# Patient Record
Sex: MALE | Race: WHITE | NOT HISPANIC OR LATINO | Employment: OTHER | URBAN - METROPOLITAN AREA
[De-identification: names, ages, dates, MRNs, and addresses within clinical notes are randomized per-mention and may not be internally consistent; named-entity substitution may affect disease eponyms.]

---

## 2019-10-20 ENCOUNTER — HOSPITAL ENCOUNTER (INPATIENT)
Facility: HOSPITAL | Age: 84
LOS: 11 days | Discharge: HOME | End: 2019-10-31
Attending: INTERNAL MEDICINE | Admitting: SURGERY

## 2019-10-20 DIAGNOSIS — K91.30 SMALL BOWEL OBSTRUCTION DUE TO POSTOPERATIVE ADHESIONS: Primary | ICD-10-CM

## 2019-10-20 PROBLEM — K43.2 INCISIONAL HERNIA, WITHOUT OBSTRUCTION OR GANGRENE: Chronic | Status: ACTIVE | Noted: 2019-10-20

## 2019-10-20 PROBLEM — E03.9 ACQUIRED HYPOTHYROIDISM: Chronic | Status: ACTIVE | Noted: 2019-10-20

## 2019-10-20 PROBLEM — K56.609 SMALL BOWEL OBSTRUCTION: Status: ACTIVE | Noted: 2019-10-20

## 2019-10-20 PROBLEM — K42.9 UMBILICAL HERNIA WITHOUT OBSTRUCTION AND WITHOUT GANGRENE: Chronic | Status: ACTIVE | Noted: 2019-10-20

## 2019-10-20 PROBLEM — K56.609 BOWEL OBSTRUCTION (HCC): Status: ACTIVE | Noted: 2019-10-20

## 2019-10-20 LAB
D-LACTATE SERPL-SCNC: 1.5 MMOL/L (ref 0.5–2)
DEPRECATED RDW RBC AUTO: 44.6 FL (ref 37–54)
ERYTHROCYTE [DISTWIDTH] IN BLOOD BY AUTOMATED COUNT: 13.6 % (ref 12.3–15.4)
HCT VFR BLD AUTO: 46.6 % (ref 37.5–51)
HGB BLD-MCNC: 15.8 G/DL (ref 13–17.7)
MCH RBC QN AUTO: 31.5 PG (ref 26.6–33)
MCHC RBC AUTO-ENTMCNC: 33.9 G/DL (ref 31.5–35.7)
MCV RBC AUTO: 92.9 FL (ref 79–97)
PLATELET # BLD AUTO: 249 10*3/MM3 (ref 140–450)
PMV BLD AUTO: 9.7 FL (ref 6–12)
PROCALCITONIN SERPL-MCNC: 0.08 NG/ML (ref 0.1–0.25)
RBC # BLD AUTO: 5.01 10*6/MM3 (ref 4.14–5.8)
WBC NRBC COR # BLD: 17.6 10*3/MM3 (ref 3.4–10.8)

## 2019-10-20 PROCEDURE — 87040 BLOOD CULTURE FOR BACTERIA: CPT | Performed by: INTERNAL MEDICINE

## 2019-10-20 PROCEDURE — 83605 ASSAY OF LACTIC ACID: CPT | Performed by: INTERNAL MEDICINE

## 2019-10-20 PROCEDURE — 25010000002 HEPARIN (PORCINE) PER 1000 UNITS: Performed by: INTERNAL MEDICINE

## 2019-10-20 PROCEDURE — 99223 1ST HOSP IP/OBS HIGH 75: CPT | Performed by: INTERNAL MEDICINE

## 2019-10-20 PROCEDURE — 85027 COMPLETE CBC AUTOMATED: CPT | Performed by: INTERNAL MEDICINE

## 2019-10-20 PROCEDURE — 84145 PROCALCITONIN (PCT): CPT | Performed by: INTERNAL MEDICINE

## 2019-10-20 RX ORDER — LEVOTHYROXINE SODIUM 112 UG/1
112 TABLET ORAL DAILY
COMMUNITY
Start: 2014-02-05 | End: 2020-02-13 | Stop reason: SDUPTHER

## 2019-10-20 RX ORDER — SODIUM CHLORIDE, SODIUM LACTATE, POTASSIUM CHLORIDE, CALCIUM CHLORIDE 600; 310; 30; 20 MG/100ML; MG/100ML; MG/100ML; MG/100ML
75 INJECTION, SOLUTION INTRAVENOUS CONTINUOUS
Status: DISCONTINUED | OUTPATIENT
Start: 2019-10-20 | End: 2019-10-28

## 2019-10-20 RX ORDER — ACETAMINOPHEN 650 MG/1
325 SUPPOSITORY RECTAL EVERY 4 HOURS PRN
Status: DISCONTINUED | OUTPATIENT
Start: 2019-10-20 | End: 2019-10-22

## 2019-10-20 RX ORDER — ACETAMINOPHEN 160 MG/5ML
325 SOLUTION ORAL EVERY 4 HOURS PRN
Status: DISCONTINUED | OUTPATIENT
Start: 2019-10-20 | End: 2019-10-22

## 2019-10-20 RX ORDER — PROCHLORPERAZINE EDISYLATE 5 MG/ML
5 INJECTION INTRAMUSCULAR; INTRAVENOUS EVERY 4 HOURS PRN
Status: DISCONTINUED | OUTPATIENT
Start: 2019-10-20 | End: 2019-10-31 | Stop reason: HOSPADM

## 2019-10-20 RX ORDER — HEPARIN SODIUM 5000 [USP'U]/ML
5000 INJECTION, SOLUTION INTRAVENOUS; SUBCUTANEOUS EVERY 12 HOURS SCHEDULED
Status: COMPLETED | OUTPATIENT
Start: 2019-10-20 | End: 2019-10-21

## 2019-10-20 RX ORDER — LEVOTHYROXINE SODIUM 112 UG/1
112 TABLET ORAL
Status: DISCONTINUED | OUTPATIENT
Start: 2019-10-20 | End: 2019-10-31 | Stop reason: HOSPADM

## 2019-10-20 RX ORDER — FAMOTIDINE 10 MG/ML
20 INJECTION, SOLUTION INTRAVENOUS EVERY 12 HOURS SCHEDULED
Status: DISCONTINUED | OUTPATIENT
Start: 2019-10-20 | End: 2019-10-24 | Stop reason: ALTCHOICE

## 2019-10-20 RX ORDER — SODIUM CHLORIDE 0.9 % (FLUSH) 0.9 %
10 SYRINGE (ML) INJECTION EVERY 12 HOURS SCHEDULED
Status: DISCONTINUED | OUTPATIENT
Start: 2019-10-20 | End: 2019-10-31 | Stop reason: HOSPADM

## 2019-10-20 RX ORDER — ACETAMINOPHEN 325 MG/1
325 TABLET ORAL EVERY 4 HOURS PRN
Status: DISCONTINUED | OUTPATIENT
Start: 2019-10-20 | End: 2019-10-22

## 2019-10-20 RX ORDER — SODIUM CHLORIDE 0.9 % (FLUSH) 0.9 %
10 SYRINGE (ML) INJECTION AS NEEDED
Status: DISCONTINUED | OUTPATIENT
Start: 2019-10-20 | End: 2019-10-31 | Stop reason: HOSPADM

## 2019-10-20 RX ADMIN — FAMOTIDINE 20 MG: 10 INJECTION, SOLUTION INTRAVENOUS at 21:17

## 2019-10-20 RX ADMIN — HEPARIN SODIUM 5000 UNITS: 5000 INJECTION, SOLUTION INTRAVENOUS; SUBCUTANEOUS at 21:16

## 2019-10-20 RX ADMIN — SODIUM CHLORIDE, SODIUM LACTATE, POTASSIUM CHLORIDE, AND CALCIUM CHLORIDE 100 ML/HR: 600; 310; 30; 20 INJECTION, SOLUTION INTRAVENOUS at 19:21

## 2019-10-20 RX ADMIN — Medication 10 ML: at 21:17

## 2019-10-21 ENCOUNTER — APPOINTMENT (OUTPATIENT)
Dept: GENERAL RADIOLOGY | Facility: HOSPITAL | Age: 84
End: 2019-10-21

## 2019-10-21 LAB
ANION GAP SERPL CALCULATED.3IONS-SCNC: 12 MMOL/L (ref 5–15)
BASOPHILS # BLD AUTO: 0 10*3/MM3 (ref 0–0.2)
BASOPHILS NFR BLD AUTO: 0.3 % (ref 0–1.5)
BUN BLD-MCNC: 17 MG/DL (ref 8–23)
BUN/CREAT SERPL: 19.8 (ref 7–25)
CALCIUM SPEC-SCNC: 9.2 MG/DL (ref 8.6–10.5)
CHLORIDE SERPL-SCNC: 97 MMOL/L (ref 98–107)
CO2 SERPL-SCNC: 27 MMOL/L (ref 22–29)
CREAT BLD-MCNC: 0.86 MG/DL (ref 0.76–1.27)
DEPRECATED RDW RBC AUTO: 44.6 FL (ref 37–54)
EOSINOPHIL # BLD AUTO: 0 10*3/MM3 (ref 0–0.4)
EOSINOPHIL NFR BLD AUTO: 0 % (ref 0.3–6.2)
ERYTHROCYTE [DISTWIDTH] IN BLOOD BY AUTOMATED COUNT: 13.7 % (ref 12.3–15.4)
GFR SERPL CREATININE-BSD FRML MDRD: 85 ML/MIN/1.73
GLUCOSE BLD-MCNC: 129 MG/DL (ref 65–99)
HCT VFR BLD AUTO: 43.9 % (ref 37.5–51)
HGB BLD-MCNC: 14.7 G/DL (ref 13–17.7)
LYMPHOCYTES # BLD AUTO: 1.6 10*3/MM3 (ref 0.7–3.1)
LYMPHOCYTES NFR BLD AUTO: 13.2 % (ref 19.6–45.3)
MCH RBC QN AUTO: 31.2 PG (ref 26.6–33)
MCHC RBC AUTO-ENTMCNC: 33.4 G/DL (ref 31.5–35.7)
MCV RBC AUTO: 93.3 FL (ref 79–97)
MONOCYTES # BLD AUTO: 1.3 10*3/MM3 (ref 0.1–0.9)
MONOCYTES NFR BLD AUTO: 10.6 % (ref 5–12)
NEUTROPHILS # BLD AUTO: 9.3 10*3/MM3 (ref 1.7–7)
NEUTROPHILS NFR BLD AUTO: 75.9 % (ref 42.7–76)
NRBC BLD AUTO-RTO: 0.1 /100 WBC (ref 0–0.2)
PLATELET # BLD AUTO: 239 10*3/MM3 (ref 140–450)
PMV BLD AUTO: 10.1 FL (ref 6–12)
POTASSIUM BLD-SCNC: 4 MMOL/L (ref 3.5–5.2)
RBC # BLD AUTO: 4.7 10*6/MM3 (ref 4.14–5.8)
SODIUM BLD-SCNC: 136 MMOL/L (ref 136–145)
WBC NRBC COR # BLD: 12.3 10*3/MM3 (ref 3.4–10.8)

## 2019-10-21 PROCEDURE — 99233 SBSQ HOSP IP/OBS HIGH 50: CPT | Performed by: INTERNAL MEDICINE

## 2019-10-21 PROCEDURE — 0 IOPAMIDOL PER 1 ML: Performed by: INTERNAL MEDICINE

## 2019-10-21 PROCEDURE — 74250 X-RAY XM SM INT 1CNTRST STD: CPT

## 2019-10-21 PROCEDURE — 25010000002 MORPHINE PER 10 MG: Performed by: INTERNAL MEDICINE

## 2019-10-21 PROCEDURE — 25010000002 HEPARIN (PORCINE) PER 1000 UNITS: Performed by: INTERNAL MEDICINE

## 2019-10-21 PROCEDURE — 80048 BASIC METABOLIC PNL TOTAL CA: CPT | Performed by: INTERNAL MEDICINE

## 2019-10-21 PROCEDURE — 99222 1ST HOSP IP/OBS MODERATE 55: CPT | Performed by: SURGERY

## 2019-10-21 PROCEDURE — 25010000002 PROCHLORPERAZINE 10 MG/2ML SOLUTION: Performed by: INTERNAL MEDICINE

## 2019-10-21 PROCEDURE — 85025 COMPLETE CBC W/AUTO DIFF WBC: CPT | Performed by: INTERNAL MEDICINE

## 2019-10-21 PROCEDURE — 25010000002 HEPARIN (PORCINE) PER 1000 UNITS: Performed by: SURGERY

## 2019-10-21 RX ORDER — MORPHINE SULFATE 4 MG/ML
2 INJECTION, SOLUTION INTRAMUSCULAR; INTRAVENOUS
Status: DISCONTINUED | OUTPATIENT
Start: 2019-10-21 | End: 2019-10-22

## 2019-10-21 RX ORDER — ONDANSETRON 2 MG/ML
4 INJECTION INTRAMUSCULAR; INTRAVENOUS EVERY 6 HOURS PRN
Status: DISCONTINUED | OUTPATIENT
Start: 2019-10-21 | End: 2019-10-31 | Stop reason: HOSPADM

## 2019-10-21 RX ORDER — MORPHINE SULFATE 4 MG/ML
4 INJECTION, SOLUTION INTRAMUSCULAR; INTRAVENOUS ONCE
Status: COMPLETED | OUTPATIENT
Start: 2019-10-21 | End: 2019-10-21

## 2019-10-21 RX ADMIN — MORPHINE SULFATE 2 MG: 4 INJECTION INTRAVENOUS at 19:36

## 2019-10-21 RX ADMIN — FAMOTIDINE 20 MG: 10 INJECTION, SOLUTION INTRAVENOUS at 08:51

## 2019-10-21 RX ADMIN — MORPHINE SULFATE 2 MG: 4 INJECTION INTRAVENOUS at 04:21

## 2019-10-21 RX ADMIN — IOPAMIDOL 200 ML: 755 INJECTION, SOLUTION INTRAVENOUS at 13:15

## 2019-10-21 RX ADMIN — PROCHLORPERAZINE EDISYLATE 5 MG: 5 INJECTION INTRAMUSCULAR; INTRAVENOUS at 17:38

## 2019-10-21 RX ADMIN — HEPARIN SODIUM 5000 UNITS: 5000 INJECTION, SOLUTION INTRAVENOUS; SUBCUTANEOUS at 08:51

## 2019-10-21 RX ADMIN — Medication 10 ML: at 21:22

## 2019-10-21 RX ADMIN — HEPARIN SODIUM 5000 UNITS: 5000 INJECTION, SOLUTION INTRAVENOUS; SUBCUTANEOUS at 23:05

## 2019-10-21 RX ADMIN — MORPHINE SULFATE 2 MG: 4 INJECTION INTRAVENOUS at 15:23

## 2019-10-21 RX ADMIN — PROCHLORPERAZINE EDISYLATE 5 MG: 5 INJECTION INTRAMUSCULAR; INTRAVENOUS at 04:19

## 2019-10-21 RX ADMIN — FAMOTIDINE 20 MG: 10 INJECTION, SOLUTION INTRAVENOUS at 21:21

## 2019-10-21 RX ADMIN — Medication 10 ML: at 08:52

## 2019-10-21 RX ADMIN — SODIUM CHLORIDE, SODIUM LACTATE, POTASSIUM CHLORIDE, AND CALCIUM CHLORIDE 100 ML/HR: 600; 310; 30; 20 INJECTION, SOLUTION INTRAVENOUS at 04:32

## 2019-10-22 ENCOUNTER — ANESTHESIA EVENT (OUTPATIENT)
Dept: PERIOP | Facility: HOSPITAL | Age: 84
End: 2019-10-22

## 2019-10-22 ENCOUNTER — ANESTHESIA (OUTPATIENT)
Dept: PERIOP | Facility: HOSPITAL | Age: 84
End: 2019-10-22

## 2019-10-22 LAB
ABO GROUP BLD: NORMAL
ALBUMIN SERPL-MCNC: 4 G/DL (ref 3.5–5.2)
ALBUMIN/GLOB SERPL: 1.3 G/DL
ALP SERPL-CCNC: 60 U/L (ref 39–117)
ALT SERPL W P-5'-P-CCNC: 13 U/L (ref 1–41)
ANION GAP SERPL CALCULATED.3IONS-SCNC: 14 MMOL/L (ref 5–15)
APTT PPP: 25.9 SECONDS (ref 24–31)
AST SERPL-CCNC: 20 U/L (ref 1–40)
BASOPHILS # BLD AUTO: 0.1 10*3/MM3 (ref 0–0.2)
BASOPHILS NFR BLD AUTO: 0.5 % (ref 0–1.5)
BILIRUB SERPL-MCNC: 0.5 MG/DL (ref 0.2–1.2)
BLD GP AB SCN SERPL QL: NEGATIVE
BUN BLD-MCNC: 23 MG/DL (ref 8–23)
BUN/CREAT SERPL: 27.7 (ref 7–25)
CALCIUM SPEC-SCNC: 9.7 MG/DL (ref 8.6–10.5)
CHLORIDE SERPL-SCNC: 98 MMOL/L (ref 98–107)
CO2 SERPL-SCNC: 28 MMOL/L (ref 22–29)
CREAT BLD-MCNC: 0.83 MG/DL (ref 0.76–1.27)
DEPRECATED RDW RBC AUTO: 45.1 FL (ref 37–54)
EOSINOPHIL # BLD AUTO: 0 10*3/MM3 (ref 0–0.4)
EOSINOPHIL NFR BLD AUTO: 0.1 % (ref 0.3–6.2)
ERYTHROCYTE [DISTWIDTH] IN BLOOD BY AUTOMATED COUNT: 13.7 % (ref 12.3–15.4)
GFR SERPL CREATININE-BSD FRML MDRD: 88 ML/MIN/1.73
GLOBULIN UR ELPH-MCNC: 3.2 GM/DL
GLUCOSE BLD-MCNC: 135 MG/DL (ref 65–99)
HCT VFR BLD AUTO: 44.5 % (ref 37.5–51)
HGB BLD-MCNC: 15 G/DL (ref 13–17.7)
INR PPP: 1.03 (ref 0.9–1.1)
LYMPHOCYTES # BLD AUTO: 1.5 10*3/MM3 (ref 0.7–3.1)
LYMPHOCYTES NFR BLD AUTO: 12.4 % (ref 19.6–45.3)
MCH RBC QN AUTO: 31.3 PG (ref 26.6–33)
MCHC RBC AUTO-ENTMCNC: 33.7 G/DL (ref 31.5–35.7)
MCV RBC AUTO: 92.8 FL (ref 79–97)
MONOCYTES # BLD AUTO: 1.5 10*3/MM3 (ref 0.1–0.9)
MONOCYTES NFR BLD AUTO: 11.8 % (ref 5–12)
NEUTROPHILS # BLD AUTO: 9.3 10*3/MM3 (ref 1.7–7)
NEUTROPHILS NFR BLD AUTO: 75.2 % (ref 42.7–76)
NRBC BLD AUTO-RTO: 0 /100 WBC (ref 0–0.2)
PLATELET # BLD AUTO: 238 10*3/MM3 (ref 140–450)
PMV BLD AUTO: 10.2 FL (ref 6–12)
POTASSIUM BLD-SCNC: 3.9 MMOL/L (ref 3.5–5.2)
PROT SERPL-MCNC: 7.2 G/DL (ref 6–8.5)
PROTHROMBIN TIME: 10.7 SECONDS (ref 9.6–11.7)
RBC # BLD AUTO: 4.79 10*6/MM3 (ref 4.14–5.8)
RH BLD: POSITIVE
SODIUM BLD-SCNC: 140 MMOL/L (ref 136–145)
T&S EXPIRATION DATE: NORMAL
TSH SERPL DL<=0.05 MIU/L-ACNC: 1.19 UIU/ML (ref 0.27–4.2)
WBC NRBC COR # BLD: 12.4 10*3/MM3 (ref 3.4–10.8)

## 2019-10-22 PROCEDURE — 88302 TISSUE EXAM BY PATHOLOGIST: CPT | Performed by: SURGERY

## 2019-10-22 PROCEDURE — 86900 BLOOD TYPING SEROLOGIC ABO: CPT

## 2019-10-22 PROCEDURE — 86850 RBC ANTIBODY SCREEN: CPT | Performed by: SURGERY

## 2019-10-22 PROCEDURE — 0KNK0ZZ RELEASE RIGHT ABDOMEN MUSCLE, OPEN APPROACH: ICD-10-PCS | Performed by: SURGERY

## 2019-10-22 PROCEDURE — 15734 MUSCLE-SKIN GRAFT TRUNK: CPT | Performed by: SURGERY

## 2019-10-22 PROCEDURE — 25010000002 HYDROMORPHONE PER 4 MG: Performed by: ANESTHESIOLOGY

## 2019-10-22 PROCEDURE — 25010000002 HYDRALAZINE PER 20 MG: Performed by: INTERNAL MEDICINE

## 2019-10-22 PROCEDURE — 25010000002 CEFOXITIN PER 1 G: Performed by: SURGERY

## 2019-10-22 PROCEDURE — 44120 REMOVAL OF SMALL INTESTINE: CPT | Performed by: SURGERY

## 2019-10-22 PROCEDURE — 25010000002 MORPHINE PER 10 MG: Performed by: INTERNAL MEDICINE

## 2019-10-22 PROCEDURE — 0T9B80Z DRAINAGE OF BLADDER WITH DRAINAGE DEVICE, VIA NATURAL OR ARTIFICIAL OPENING ENDOSCOPIC: ICD-10-PCS | Performed by: UROLOGY

## 2019-10-22 PROCEDURE — 0WPF0JZ REMOVAL OF SYNTHETIC SUBSTITUTE FROM ABDOMINAL WALL, OPEN APPROACH: ICD-10-PCS | Performed by: SURGERY

## 2019-10-22 PROCEDURE — C1781 MESH (IMPLANTABLE): HCPCS | Performed by: SURGERY

## 2019-10-22 PROCEDURE — 84443 ASSAY THYROID STIM HORMONE: CPT | Performed by: INTERNAL MEDICINE

## 2019-10-22 PROCEDURE — 25010000002 DEXAMETHASONE PER 1 MG: Performed by: ANESTHESIOLOGY

## 2019-10-22 PROCEDURE — 25010000002 FENTANYL CITRATE (PF) 100 MCG/2ML SOLUTION: Performed by: ANESTHESIOLOGY

## 2019-10-22 PROCEDURE — 0DN80ZZ RELEASE SMALL INTESTINE, OPEN APPROACH: ICD-10-PCS | Performed by: SURGERY

## 2019-10-22 PROCEDURE — 80053 COMPREHEN METABOLIC PANEL: CPT | Performed by: INTERNAL MEDICINE

## 2019-10-22 PROCEDURE — 85025 COMPLETE CBC W/AUTO DIFF WBC: CPT | Performed by: INTERNAL MEDICINE

## 2019-10-22 PROCEDURE — 49561 PR REPAIR INCISIONAL HERNIA,STRANG: CPT | Performed by: SURGERY

## 2019-10-22 PROCEDURE — 86901 BLOOD TYPING SEROLOGIC RH(D): CPT

## 2019-10-22 PROCEDURE — 88307 TISSUE EXAM BY PATHOLOGIST: CPT | Performed by: SURGERY

## 2019-10-22 PROCEDURE — 0KNL0ZZ RELEASE LEFT ABDOMEN MUSCLE, OPEN APPROACH: ICD-10-PCS | Performed by: SURGERY

## 2019-10-22 PROCEDURE — 99233 SBSQ HOSP IP/OBS HIGH 50: CPT | Performed by: INTERNAL MEDICINE

## 2019-10-22 PROCEDURE — 0WUF0JZ SUPPLEMENT ABDOMINAL WALL WITH SYNTHETIC SUBSTITUTE, OPEN APPROACH: ICD-10-PCS | Performed by: SURGERY

## 2019-10-22 PROCEDURE — 85730 THROMBOPLASTIN TIME PARTIAL: CPT | Performed by: SURGERY

## 2019-10-22 PROCEDURE — 25010000002 ONDANSETRON PER 1 MG: Performed by: INTERNAL MEDICINE

## 2019-10-22 PROCEDURE — 86900 BLOOD TYPING SEROLOGIC ABO: CPT | Performed by: SURGERY

## 2019-10-22 PROCEDURE — 85610 PROTHROMBIN TIME: CPT | Performed by: SURGERY

## 2019-10-22 PROCEDURE — 0DB80ZZ EXCISION OF SMALL INTESTINE, OPEN APPROACH: ICD-10-PCS | Performed by: SURGERY

## 2019-10-22 PROCEDURE — 25010000002 PROPOFOL 10 MG/ML EMULSION: Performed by: ANESTHESIOLOGY

## 2019-10-22 PROCEDURE — 86901 BLOOD TYPING SEROLOGIC RH(D): CPT | Performed by: SURGERY

## 2019-10-22 PROCEDURE — C1769 GUIDE WIRE: HCPCS | Performed by: SURGERY

## 2019-10-22 PROCEDURE — 99024 POSTOP FOLLOW-UP VISIT: CPT | Performed by: SURGERY

## 2019-10-22 DEVICE — PROXIMATE RELOADABLE LINEAR CUTTER WITH SAFETY LOCK-OUT, 75MM
Type: IMPLANTABLE DEVICE | Site: ABDOMEN | Status: FUNCTIONAL
Brand: PROXIMATE

## 2019-10-22 DEVICE — UNDYED KNITTED (POLYGLACTIN 910), SYNTHETIC ABSORBABLE MESH
Type: IMPLANTABLE DEVICE | Site: ABDOMEN | Status: FUNCTIONAL
Brand: VICRYL

## 2019-10-22 DEVICE — PROXIMATE LINEAR CUTTER RELOAD, BLUE, 75MM
Type: IMPLANTABLE DEVICE | Site: ABDOMEN | Status: FUNCTIONAL
Brand: PROXIMATE

## 2019-10-22 RX ORDER — OXYCODONE HYDROCHLORIDE 5 MG/1
5 TABLET ORAL EVERY 4 HOURS PRN
Status: DISCONTINUED | OUTPATIENT
Start: 2019-10-22 | End: 2019-10-31 | Stop reason: HOSPADM

## 2019-10-22 RX ORDER — FENTANYL CITRATE 50 UG/ML
INJECTION, SOLUTION INTRAMUSCULAR; INTRAVENOUS AS NEEDED
Status: DISCONTINUED | OUTPATIENT
Start: 2019-10-22 | End: 2019-10-22 | Stop reason: SURG

## 2019-10-22 RX ORDER — MORPHINE SULFATE 4 MG/ML
5 INJECTION, SOLUTION INTRAMUSCULAR; INTRAVENOUS
Status: DISCONTINUED | OUTPATIENT
Start: 2019-10-22 | End: 2019-10-22 | Stop reason: HOSPADM

## 2019-10-22 RX ORDER — PROPOFOL 10 MG/ML
VIAL (ML) INTRAVENOUS AS NEEDED
Status: DISCONTINUED | OUTPATIENT
Start: 2019-10-22 | End: 2019-10-22 | Stop reason: SURG

## 2019-10-22 RX ORDER — ONDANSETRON 2 MG/ML
4 INJECTION INTRAMUSCULAR; INTRAVENOUS ONCE
Status: DISCONTINUED | OUTPATIENT
Start: 2019-10-22 | End: 2019-10-22 | Stop reason: HOSPADM

## 2019-10-22 RX ORDER — NEOSTIGMINE METHYLSULFATE 5 MG/5 ML
SYRINGE (ML) INTRAVENOUS AS NEEDED
Status: DISCONTINUED | OUTPATIENT
Start: 2019-10-22 | End: 2019-10-22 | Stop reason: SURG

## 2019-10-22 RX ORDER — ROCURONIUM BROMIDE 10 MG/ML
INJECTION, SOLUTION INTRAVENOUS AS NEEDED
Status: DISCONTINUED | OUTPATIENT
Start: 2019-10-22 | End: 2019-10-22 | Stop reason: SURG

## 2019-10-22 RX ORDER — OXYCODONE HYDROCHLORIDE 5 MG/1
5 TABLET ORAL ONCE AS NEEDED
Status: DISCONTINUED | OUTPATIENT
Start: 2019-10-22 | End: 2019-10-22 | Stop reason: HOSPADM

## 2019-10-22 RX ORDER — METOPROLOL TARTRATE 5 MG/5ML
2 INJECTION INTRAVENOUS ONCE
Status: DISCONTINUED | OUTPATIENT
Start: 2019-10-22 | End: 2019-10-22 | Stop reason: HOSPADM

## 2019-10-22 RX ORDER — PHENYLEPHRINE HCL IN 0.9% NACL 0.5 MG/5ML
SYRINGE (ML) INTRAVENOUS AS NEEDED
Status: DISCONTINUED | OUTPATIENT
Start: 2019-10-22 | End: 2019-10-22 | Stop reason: SURG

## 2019-10-22 RX ORDER — MEPERIDINE HYDROCHLORIDE 25 MG/ML
12.5 INJECTION INTRAMUSCULAR; INTRAVENOUS; SUBCUTANEOUS
Status: DISCONTINUED | OUTPATIENT
Start: 2019-10-22 | End: 2019-10-22 | Stop reason: HOSPADM

## 2019-10-22 RX ORDER — DEXAMETHASONE SODIUM PHOSPHATE 4 MG/ML
INJECTION, SOLUTION INTRA-ARTICULAR; INTRALESIONAL; INTRAMUSCULAR; INTRAVENOUS; SOFT TISSUE AS NEEDED
Status: DISCONTINUED | OUTPATIENT
Start: 2019-10-22 | End: 2019-10-22 | Stop reason: SURG

## 2019-10-22 RX ORDER — HYDROMORPHONE HCL 110MG/55ML
1 PATIENT CONTROLLED ANALGESIA SYRINGE INTRAVENOUS
Status: DISCONTINUED | OUTPATIENT
Start: 2019-10-22 | End: 2019-10-22 | Stop reason: HOSPADM

## 2019-10-22 RX ORDER — HYDRALAZINE HYDROCHLORIDE 20 MG/ML
10 INJECTION INTRAMUSCULAR; INTRAVENOUS EVERY 6 HOURS PRN
Status: DISCONTINUED | OUTPATIENT
Start: 2019-10-22 | End: 2019-10-31 | Stop reason: HOSPADM

## 2019-10-22 RX ORDER — MAGNESIUM HYDROXIDE 1200 MG/15ML
LIQUID ORAL AS NEEDED
Status: DISCONTINUED | OUTPATIENT
Start: 2019-10-22 | End: 2019-10-22 | Stop reason: HOSPADM

## 2019-10-22 RX ORDER — ACETAMINOPHEN 500 MG
1000 TABLET ORAL EVERY 8 HOURS
Status: DISPENSED | OUTPATIENT
Start: 2019-10-22 | End: 2019-10-24

## 2019-10-22 RX ORDER — MORPHINE SULFATE 4 MG/ML
4 INJECTION, SOLUTION INTRAMUSCULAR; INTRAVENOUS
Status: DISCONTINUED | OUTPATIENT
Start: 2019-10-22 | End: 2019-10-30

## 2019-10-22 RX ORDER — LIDOCAINE HYDROCHLORIDE 20 MG/ML
INJECTION, SOLUTION EPIDURAL; INFILTRATION; INTRACAUDAL; PERINEURAL AS NEEDED
Status: DISCONTINUED | OUTPATIENT
Start: 2019-10-22 | End: 2019-10-22 | Stop reason: SURG

## 2019-10-22 RX ORDER — GLYCOPYRROLATE 1 MG/5 ML
SYRINGE (ML) INTRAVENOUS AS NEEDED
Status: DISCONTINUED | OUTPATIENT
Start: 2019-10-22 | End: 2019-10-22 | Stop reason: SURG

## 2019-10-22 RX ORDER — SODIUM CHLORIDE 9 MG/ML
INJECTION, SOLUTION INTRAVENOUS CONTINUOUS PRN
Status: DISCONTINUED | OUTPATIENT
Start: 2019-10-22 | End: 2019-10-22 | Stop reason: SURG

## 2019-10-22 RX ADMIN — FENTANYL CITRATE 25 MCG: 50 INJECTION, SOLUTION INTRAMUSCULAR; INTRAVENOUS at 14:58

## 2019-10-22 RX ADMIN — FENTANYL CITRATE 25 MCG: 50 INJECTION, SOLUTION INTRAMUSCULAR; INTRAVENOUS at 15:00

## 2019-10-22 RX ADMIN — FENTANYL CITRATE 25 MCG: 50 INJECTION, SOLUTION INTRAMUSCULAR; INTRAVENOUS at 14:01

## 2019-10-22 RX ADMIN — PHENYLEPHRINE HYDROCHLORIDE 100 MCG: 10 INJECTION INTRAVENOUS at 15:28

## 2019-10-22 RX ADMIN — HYDROMORPHONE HYDROCHLORIDE 1 MG: 2 INJECTION INTRAMUSCULAR; INTRAVENOUS; SUBCUTANEOUS at 18:44

## 2019-10-22 RX ADMIN — PROPOFOL 120 MG: 10 INJECTION, EMULSION INTRAVENOUS at 14:03

## 2019-10-22 RX ADMIN — PHENYLEPHRINE HYDROCHLORIDE 100 MCG: 10 INJECTION INTRAVENOUS at 15:26

## 2019-10-22 RX ADMIN — ROCURONIUM BROMIDE 20 MG: 10 INJECTION, SOLUTION INTRAVENOUS at 15:16

## 2019-10-22 RX ADMIN — SODIUM CHLORIDE, SODIUM LACTATE, POTASSIUM CHLORIDE, AND CALCIUM CHLORIDE 100 ML/HR: 600; 310; 30; 20 INJECTION, SOLUTION INTRAVENOUS at 06:50

## 2019-10-22 RX ADMIN — FENTANYL CITRATE 50 MCG: 50 INJECTION, SOLUTION INTRAMUSCULAR; INTRAVENOUS at 18:00

## 2019-10-22 RX ADMIN — PHENYLEPHRINE HYDROCHLORIDE 100 MCG: 10 INJECTION INTRAVENOUS at 16:35

## 2019-10-22 RX ADMIN — LIDOCAINE HYDROCHLORIDE 50 MG: 20 INJECTION, SOLUTION EPIDURAL; INFILTRATION; INTRACAUDAL; PERINEURAL at 14:03

## 2019-10-22 RX ADMIN — SODIUM CHLORIDE: 0.9 INJECTION, SOLUTION INTRAVENOUS at 14:23

## 2019-10-22 RX ADMIN — FENTANYL CITRATE 25 MCG: 50 INJECTION, SOLUTION INTRAMUSCULAR; INTRAVENOUS at 14:03

## 2019-10-22 RX ADMIN — SODIUM CHLORIDE: 0.9 INJECTION, SOLUTION INTRAVENOUS at 16:50

## 2019-10-22 RX ADMIN — MORPHINE SULFATE 2 MG: 4 INJECTION INTRAVENOUS at 08:25

## 2019-10-22 RX ADMIN — FENTANYL CITRATE 25 MCG: 50 INJECTION, SOLUTION INTRAMUSCULAR; INTRAVENOUS at 17:20

## 2019-10-22 RX ADMIN — ROCURONIUM BROMIDE 20 MG: 10 INJECTION, SOLUTION INTRAVENOUS at 14:37

## 2019-10-22 RX ADMIN — Medication 10 ML: at 22:36

## 2019-10-22 RX ADMIN — Medication 10 ML: at 08:25

## 2019-10-22 RX ADMIN — HYDROMORPHONE HYDROCHLORIDE 1 MG: 2 INJECTION INTRAMUSCULAR; INTRAVENOUS; SUBCUTANEOUS at 19:03

## 2019-10-22 RX ADMIN — HYDRALAZINE HYDROCHLORIDE 10 MG: 20 INJECTION INTRAMUSCULAR; INTRAVENOUS at 09:17

## 2019-10-22 RX ADMIN — ROCURONIUM BROMIDE 10 MG: 10 INJECTION, SOLUTION INTRAVENOUS at 16:05

## 2019-10-22 RX ADMIN — ROCURONIUM BROMIDE 10 MG: 10 INJECTION, SOLUTION INTRAVENOUS at 14:19

## 2019-10-22 RX ADMIN — FENTANYL CITRATE 25 MCG: 50 INJECTION, SOLUTION INTRAMUSCULAR; INTRAVENOUS at 15:34

## 2019-10-22 RX ADMIN — FAMOTIDINE 20 MG: 10 INJECTION, SOLUTION INTRAVENOUS at 22:35

## 2019-10-22 RX ADMIN — ONDANSETRON 4 MG: 2 INJECTION INTRAMUSCULAR; INTRAVENOUS at 17:30

## 2019-10-22 RX ADMIN — CEFOXITIN SODIUM 2 G: 2 POWDER, FOR SOLUTION INTRAVENOUS at 23:08

## 2019-10-22 RX ADMIN — ROCURONIUM BROMIDE 40 MG: 10 INJECTION, SOLUTION INTRAVENOUS at 14:03

## 2019-10-22 RX ADMIN — MORPHINE SULFATE 2 MG: 4 INJECTION INTRAVENOUS at 11:40

## 2019-10-22 RX ADMIN — CEFAZOLIN SODIUM 2 G: 1 INJECTION, POWDER, FOR SOLUTION INTRAMUSCULAR; INTRAVENOUS at 14:11

## 2019-10-22 RX ADMIN — ROCURONIUM BROMIDE 20 MG: 10 INJECTION, SOLUTION INTRAVENOUS at 16:50

## 2019-10-22 RX ADMIN — FENTANYL CITRATE 25 MCG: 50 INJECTION, SOLUTION INTRAMUSCULAR; INTRAVENOUS at 15:06

## 2019-10-22 RX ADMIN — FENTANYL CITRATE 50 MCG: 50 INJECTION, SOLUTION INTRAMUSCULAR; INTRAVENOUS at 17:50

## 2019-10-22 RX ADMIN — Medication 3 MG: at 17:55

## 2019-10-22 RX ADMIN — FENTANYL CITRATE 25 MCG: 50 INJECTION, SOLUTION INTRAMUSCULAR; INTRAVENOUS at 14:37

## 2019-10-22 RX ADMIN — FAMOTIDINE 20 MG: 10 INJECTION, SOLUTION INTRAVENOUS at 08:25

## 2019-10-22 RX ADMIN — Medication 0.4 MG: at 17:55

## 2019-10-22 RX ADMIN — DEXAMETHASONE SODIUM PHOSPHATE 4 MG: 4 INJECTION, SOLUTION INTRAMUSCULAR; INTRAVENOUS at 14:12

## 2019-10-22 NOTE — ANESTHESIA PREPROCEDURE EVALUATION
Anesthesia Evaluation     Patient summary reviewed and Nursing notes reviewed   NPO Solid Status: > 8 hours  NPO Liquid Status: > 8 hours           Airway   Mallampati: II  TM distance: >3 FB  Neck ROM: full  No difficulty expected  Dental    (+) edentulous    Pulmonary - negative pulmonary ROS and normal exam   Cardiovascular - negative cardio ROS and normal exam        Neuro/Psych- negative ROS  GI/Hepatic/Renal/Endo    (+)   hypothyroidism,     Musculoskeletal (-) negative ROS    Abdominal    Substance History      OB/GYN          Other - negative ROS                       Anesthesia Plan    ASA 3     general     intravenous induction   Anesthetic plan, all risks, benefits, and alternatives have been provided, discussed and informed consent has been obtained with: patient and child.

## 2019-10-22 NOTE — ANESTHESIA PROCEDURE NOTES
Airway  Urgency: elective    Date/Time: 10/22/2019 2:04 PM  Airway not difficult    General Information and Staff    Patient location during procedure: OR    Indications and Patient Condition    Preoxygenated: yes  Mask difficulty assessment: 0 - not attempted    Final Airway Details  Final airway type: endotracheal airway      Successful airway: ETT    Successful intubation technique: direct laryngoscopy  Endotracheal tube insertion site: oral  Blade: Prashanth  Blade size: 3  ETT size (mm): 8.0  Cormack-Lehane Classification: grade I - full view of glottis  Placement verified by: chest auscultation and capnometry   Measured from: gums  ETT/EBT to gums (cm): 21  Number of attempts at approach: 1  Assessment: atraumatic intubation

## 2019-10-23 LAB
ANION GAP SERPL CALCULATED.3IONS-SCNC: 11 MMOL/L (ref 5–15)
BUN BLD-MCNC: 36 MG/DL (ref 8–23)
BUN/CREAT SERPL: 22.9 (ref 7–25)
CALCIUM SPEC-SCNC: 8.2 MG/DL (ref 8.6–10.5)
CHLORIDE SERPL-SCNC: 107 MMOL/L (ref 98–107)
CO2 SERPL-SCNC: 23 MMOL/L (ref 22–29)
CREAT BLD-MCNC: 1.57 MG/DL (ref 0.76–1.27)
DEPRECATED RDW RBC AUTO: 47.7 FL (ref 37–54)
ERYTHROCYTE [DISTWIDTH] IN BLOOD BY AUTOMATED COUNT: 14.3 % (ref 12.3–15.4)
GFR SERPL CREATININE-BSD FRML MDRD: 42 ML/MIN/1.73
GIANT PLATELETS: ABNORMAL
GLUCOSE BLD-MCNC: 119 MG/DL (ref 65–99)
HCT VFR BLD AUTO: 43.3 % (ref 37.5–51)
HGB BLD-MCNC: 14.3 G/DL (ref 13–17.7)
LYMPHOCYTES # BLD MANUAL: 1.39 10*3/MM3 (ref 0.7–3.1)
LYMPHOCYTES NFR BLD MANUAL: 8 % (ref 19.6–45.3)
LYMPHOCYTES NFR BLD MANUAL: 8 % (ref 5–12)
MAGNESIUM SERPL-MCNC: 1.6 MG/DL (ref 1.6–2.4)
MCH RBC QN AUTO: 31.2 PG (ref 26.6–33)
MCHC RBC AUTO-ENTMCNC: 33.1 G/DL (ref 31.5–35.7)
MCV RBC AUTO: 94.4 FL (ref 79–97)
METAMYELOCYTES NFR BLD MANUAL: 3 % (ref 0–0)
MONOCYTES # BLD AUTO: 1.39 10*3/MM3 (ref 0.1–0.9)
NEUTROPHILS # BLD AUTO: 14.09 10*3/MM3 (ref 1.7–7)
NEUTROPHILS NFR BLD MANUAL: 33 % (ref 42.7–76)
NEUTS BAND NFR BLD MANUAL: 48 % (ref 0–5)
PLATELET # BLD AUTO: 239 10*3/MM3 (ref 140–450)
PMV BLD AUTO: 10.3 FL (ref 6–12)
POTASSIUM BLD-SCNC: 4.4 MMOL/L (ref 3.5–5.2)
RBC # BLD AUTO: 4.58 10*6/MM3 (ref 4.14–5.8)
RBC MORPH BLD: NORMAL
SCAN SLIDE: NORMAL
SODIUM BLD-SCNC: 141 MMOL/L (ref 136–145)
TOXIC GRANULATION: ABNORMAL
WBC NRBC COR # BLD: 17.4 10*3/MM3 (ref 3.4–10.8)

## 2019-10-23 PROCEDURE — 83735 ASSAY OF MAGNESIUM: CPT | Performed by: INTERNAL MEDICINE

## 2019-10-23 PROCEDURE — 85025 COMPLETE CBC W/AUTO DIFF WBC: CPT | Performed by: INTERNAL MEDICINE

## 2019-10-23 PROCEDURE — 99024 POSTOP FOLLOW-UP VISIT: CPT | Performed by: SURGERY

## 2019-10-23 PROCEDURE — 80048 BASIC METABOLIC PNL TOTAL CA: CPT | Performed by: INTERNAL MEDICINE

## 2019-10-23 PROCEDURE — 25010000002 CEFOXITIN PER 1 G: Performed by: SURGERY

## 2019-10-23 PROCEDURE — 25010000002 MORPHINE PER 10 MG: Performed by: SURGERY

## 2019-10-23 PROCEDURE — 99233 SBSQ HOSP IP/OBS HIGH 50: CPT | Performed by: INTERNAL MEDICINE

## 2019-10-23 PROCEDURE — 85007 BL SMEAR W/DIFF WBC COUNT: CPT | Performed by: INTERNAL MEDICINE

## 2019-10-23 RX ADMIN — SODIUM CHLORIDE, SODIUM LACTATE, POTASSIUM CHLORIDE, AND CALCIUM CHLORIDE 100 ML/HR: 600; 310; 30; 20 INJECTION, SOLUTION INTRAVENOUS at 21:51

## 2019-10-23 RX ADMIN — ACETAMINOPHEN 1000 MG: 500 TABLET, FILM COATED ORAL at 16:27

## 2019-10-23 RX ADMIN — CEFOXITIN SODIUM 2 G: 2 POWDER, FOR SOLUTION INTRAVENOUS at 09:50

## 2019-10-23 RX ADMIN — ACETAMINOPHEN 1000 MG: 500 TABLET, FILM COATED ORAL at 21:50

## 2019-10-23 RX ADMIN — SODIUM CHLORIDE, SODIUM LACTATE, POTASSIUM CHLORIDE, AND CALCIUM CHLORIDE 100 ML/HR: 600; 310; 30; 20 INJECTION, SOLUTION INTRAVENOUS at 11:58

## 2019-10-23 RX ADMIN — FAMOTIDINE 20 MG: 10 INJECTION, SOLUTION INTRAVENOUS at 21:50

## 2019-10-23 RX ADMIN — CEFOXITIN SODIUM 2 G: 2 POWDER, FOR SOLUTION INTRAVENOUS at 02:52

## 2019-10-23 RX ADMIN — MORPHINE SULFATE 4 MG: 4 INJECTION INTRAVENOUS at 11:59

## 2019-10-23 RX ADMIN — FAMOTIDINE 20 MG: 10 INJECTION, SOLUTION INTRAVENOUS at 09:12

## 2019-10-23 RX ADMIN — Medication 10 ML: at 21:50

## 2019-10-23 RX ADMIN — Medication 10 ML: at 09:12

## 2019-10-24 LAB
ANION GAP SERPL CALCULATED.3IONS-SCNC: 10 MMOL/L (ref 5–15)
BASOPHILS # BLD AUTO: 0 10*3/MM3 (ref 0–0.2)
BASOPHILS NFR BLD AUTO: 0.1 % (ref 0–1.5)
BUN BLD-MCNC: 34 MG/DL (ref 8–23)
BUN/CREAT SERPL: 28.6 (ref 7–25)
CALCIUM SPEC-SCNC: 8.1 MG/DL (ref 8.6–10.5)
CHLORIDE SERPL-SCNC: 108 MMOL/L (ref 98–107)
CO2 SERPL-SCNC: 24 MMOL/L (ref 22–29)
CREAT BLD-MCNC: 1.19 MG/DL (ref 0.76–1.27)
DEPRECATED RDW RBC AUTO: 46.4 FL (ref 37–54)
EOSINOPHIL # BLD AUTO: 0 10*3/MM3 (ref 0–0.4)
EOSINOPHIL NFR BLD AUTO: 0.1 % (ref 0.3–6.2)
ERYTHROCYTE [DISTWIDTH] IN BLOOD BY AUTOMATED COUNT: 14 % (ref 12.3–15.4)
GFR SERPL CREATININE-BSD FRML MDRD: 58 ML/MIN/1.73
GLUCOSE BLD-MCNC: 124 MG/DL (ref 65–99)
HCT VFR BLD AUTO: 35.5 % (ref 37.5–51)
HGB BLD-MCNC: 12.1 G/DL (ref 13–17.7)
LAB AP CASE REPORT: NORMAL
LYMPHOCYTES # BLD AUTO: 1.6 10*3/MM3 (ref 0.7–3.1)
LYMPHOCYTES NFR BLD AUTO: 11.5 % (ref 19.6–45.3)
MCH RBC QN AUTO: 31.9 PG (ref 26.6–33)
MCHC RBC AUTO-ENTMCNC: 33.9 G/DL (ref 31.5–35.7)
MCV RBC AUTO: 94.1 FL (ref 79–97)
MONOCYTES # BLD AUTO: 1.1 10*3/MM3 (ref 0.1–0.9)
MONOCYTES NFR BLD AUTO: 7.6 % (ref 5–12)
NEUTROPHILS # BLD AUTO: 11.6 10*3/MM3 (ref 1.7–7)
NEUTROPHILS NFR BLD AUTO: 80.7 % (ref 42.7–76)
NRBC BLD AUTO-RTO: 0 /100 WBC (ref 0–0.2)
PATH REPORT.FINAL DX SPEC: NORMAL
PATH REPORT.GROSS SPEC: NORMAL
PLATELET # BLD AUTO: 186 10*3/MM3 (ref 140–450)
PMV BLD AUTO: 10.2 FL (ref 6–12)
POTASSIUM BLD-SCNC: 3.7 MMOL/L (ref 3.5–5.2)
RBC # BLD AUTO: 3.78 10*6/MM3 (ref 4.14–5.8)
SODIUM BLD-SCNC: 142 MMOL/L (ref 136–145)
WBC NRBC COR # BLD: 14.3 10*3/MM3 (ref 3.4–10.8)

## 2019-10-24 PROCEDURE — 80048 BASIC METABOLIC PNL TOTAL CA: CPT | Performed by: INTERNAL MEDICINE

## 2019-10-24 PROCEDURE — 97162 PT EVAL MOD COMPLEX 30 MIN: CPT

## 2019-10-24 PROCEDURE — 85025 COMPLETE CBC W/AUTO DIFF WBC: CPT | Performed by: INTERNAL MEDICINE

## 2019-10-24 PROCEDURE — 99232 SBSQ HOSP IP/OBS MODERATE 35: CPT | Performed by: INTERNAL MEDICINE

## 2019-10-24 PROCEDURE — 99024 POSTOP FOLLOW-UP VISIT: CPT | Performed by: SURGERY

## 2019-10-24 PROCEDURE — 97530 THERAPEUTIC ACTIVITIES: CPT

## 2019-10-24 PROCEDURE — 25010000002 ONDANSETRON PER 1 MG: Performed by: INTERNAL MEDICINE

## 2019-10-24 RX ORDER — FAMOTIDINE 20 MG/1
20 TABLET, FILM COATED ORAL
Status: DISCONTINUED | OUTPATIENT
Start: 2019-10-24 | End: 2019-10-25 | Stop reason: ALTCHOICE

## 2019-10-24 RX ADMIN — ACETAMINOPHEN 1000 MG: 500 TABLET, FILM COATED ORAL at 07:16

## 2019-10-24 RX ADMIN — SODIUM CHLORIDE, SODIUM LACTATE, POTASSIUM CHLORIDE, AND CALCIUM CHLORIDE 100 ML/HR: 600; 310; 30; 20 INJECTION, SOLUTION INTRAVENOUS at 21:08

## 2019-10-24 RX ADMIN — Medication 10 ML: at 21:08

## 2019-10-24 RX ADMIN — SODIUM CHLORIDE, SODIUM LACTATE, POTASSIUM CHLORIDE, AND CALCIUM CHLORIDE 100 ML/HR: 600; 310; 30; 20 INJECTION, SOLUTION INTRAVENOUS at 07:17

## 2019-10-24 RX ADMIN — FAMOTIDINE 20 MG: 10 INJECTION, SOLUTION INTRAVENOUS at 10:19

## 2019-10-24 RX ADMIN — ONDANSETRON 4 MG: 2 INJECTION INTRAMUSCULAR; INTRAVENOUS at 21:08

## 2019-10-24 RX ADMIN — FAMOTIDINE 20 MG: 20 TABLET, FILM COATED ORAL at 16:39

## 2019-10-24 RX ADMIN — ACETAMINOPHEN 1000 MG: 500 TABLET, FILM COATED ORAL at 14:26

## 2019-10-24 RX ADMIN — LEVOTHYROXINE SODIUM 112 MCG: 112 TABLET ORAL at 07:16

## 2019-10-24 NOTE — ANESTHESIA POSTPROCEDURE EVALUATION
Patient: Christiano Guzmán    Procedure Summary     Date:  10/22/19 Room / Location:  [facility] OR 06 / [facility] MAIN OR    Anesthesia Start:  1355 Anesthesia Stop:  1808    Procedures:       EXPLORATORY LAPAROTOMY, EXTENSIVE LYSIS OF ADHESIONS, EXPLANT OF PREVIOUS  HERNIA MESH, BILATERAL SUBCUTANEOUS SKIN FLAPS, RIGHT SIDED EXTERNAL OBLIQUE RELEASE, VENTRAL  HERNIA REPAIR WITH MESH UNDERLAY AND SMALL BOWEL RESECTION WITH PRIMARY ANASTOMOSIS (N/A Abdomen)      CYSTOSCOPY FLEXIBLE AT BEDSIDE WITH MCGARRY CATHETER PLACEMENT (N/A ) Diagnosis:       Small bowel obstruction due to postoperative adhesions      (Small bowel obstruction due to postoperative adhesions [K91.30])    Surgeon:  Poncho Nelson MD; Mynor Amor MD Provider:  Maurice Guerrero MD    Anesthesia Type:  general ASA Status:  3          Anesthesia Type: general  Last vitals  BP   143/74 (10/24/19 0652)   Temp   98 °F (36.7 °C) (10/24/19 0652)   Pulse   88 (10/24/19 0652)   Resp   15 (10/24/19 0652)     SpO2   93 % (10/24/19 0652)     Post Anesthesia Care and Evaluation    Patient location during evaluation: PACU  Pain management: adequate  Airway patency: patent  Anesthetic complications: No anesthetic complications  PONV Status: none  Cardiovascular status: acceptable  Respiratory status: acceptable  Hydration status: acceptable

## 2019-10-25 ENCOUNTER — APPOINTMENT (OUTPATIENT)
Dept: GENERAL RADIOLOGY | Facility: HOSPITAL | Age: 84
End: 2019-10-25

## 2019-10-25 LAB
ALBUMIN SERPL-MCNC: 2.8 G/DL (ref 3.5–5.2)
ALBUMIN/GLOB SERPL: 0.8 G/DL
ALP SERPL-CCNC: 121 U/L (ref 39–117)
ALT SERPL W P-5'-P-CCNC: 46 U/L (ref 1–41)
ANION GAP SERPL CALCULATED.3IONS-SCNC: 13 MMOL/L (ref 5–15)
AST SERPL-CCNC: 54 U/L (ref 1–40)
BACTERIA SPEC AEROBE CULT: NORMAL
BACTERIA SPEC AEROBE CULT: NORMAL
BACTERIA UR QL AUTO: ABNORMAL /HPF
BASOPHILS # BLD AUTO: 0 10*3/MM3 (ref 0–0.2)
BASOPHILS NFR BLD AUTO: 0.1 % (ref 0–1.5)
BILIRUB SERPL-MCNC: 0.5 MG/DL (ref 0.2–1.2)
BILIRUB UR QL STRIP: ABNORMAL
BUN BLD-MCNC: 19 MG/DL (ref 8–23)
BUN/CREAT SERPL: 23.5 (ref 7–25)
CALCIUM SPEC-SCNC: 8.8 MG/DL (ref 8.6–10.5)
CHLORIDE SERPL-SCNC: 105 MMOL/L (ref 98–107)
CLARITY UR: CLEAR
CO2 SERPL-SCNC: 26 MMOL/L (ref 22–29)
COLOR UR: YELLOW
CREAT BLD-MCNC: 0.81 MG/DL (ref 0.76–1.27)
DEPRECATED RDW RBC AUTO: 45.9 FL (ref 37–54)
EOSINOPHIL # BLD AUTO: 0 10*3/MM3 (ref 0–0.4)
EOSINOPHIL NFR BLD AUTO: 0 % (ref 0.3–6.2)
ERYTHROCYTE [DISTWIDTH] IN BLOOD BY AUTOMATED COUNT: 14 % (ref 12.3–15.4)
GFR SERPL CREATININE-BSD FRML MDRD: 91 ML/MIN/1.73
GLOBULIN UR ELPH-MCNC: 3.6 GM/DL
GLUCOSE BLD-MCNC: 117 MG/DL (ref 65–99)
GLUCOSE UR STRIP-MCNC: NEGATIVE MG/DL
HCT VFR BLD AUTO: 35.6 % (ref 37.5–51)
HGB BLD-MCNC: 12.5 G/DL (ref 13–17.7)
HGB UR QL STRIP.AUTO: ABNORMAL
HYALINE CASTS UR QL AUTO: ABNORMAL /LPF
KETONES UR QL STRIP: ABNORMAL
LEUKOCYTE ESTERASE UR QL STRIP.AUTO: NEGATIVE
LIPASE SERPL-CCNC: 70 U/L (ref 13–60)
LYMPHOCYTES # BLD AUTO: 1.3 10*3/MM3 (ref 0.7–3.1)
LYMPHOCYTES NFR BLD AUTO: 8.3 % (ref 19.6–45.3)
MCH RBC QN AUTO: 32.7 PG (ref 26.6–33)
MCHC RBC AUTO-ENTMCNC: 35.1 G/DL (ref 31.5–35.7)
MCV RBC AUTO: 93.1 FL (ref 79–97)
MONOCYTES # BLD AUTO: 0.9 10*3/MM3 (ref 0.1–0.9)
MONOCYTES NFR BLD AUTO: 5.8 % (ref 5–12)
NEUTROPHILS # BLD AUTO: 13.6 10*3/MM3 (ref 1.7–7)
NEUTROPHILS NFR BLD AUTO: 85.8 % (ref 42.7–76)
NITRITE UR QL STRIP: NEGATIVE
NRBC BLD AUTO-RTO: 0.1 /100 WBC (ref 0–0.2)
PH UR STRIP.AUTO: 6.5 [PH] (ref 5–8)
PLATELET # BLD AUTO: 216 10*3/MM3 (ref 140–450)
PMV BLD AUTO: 9.9 FL (ref 6–12)
POTASSIUM BLD-SCNC: 3.6 MMOL/L (ref 3.5–5.2)
PROT SERPL-MCNC: 6.4 G/DL (ref 6–8.5)
PROT UR QL STRIP: ABNORMAL
RBC # BLD AUTO: 3.82 10*6/MM3 (ref 4.14–5.8)
RBC # UR: ABNORMAL /HPF
REF LAB TEST METHOD: ABNORMAL
SODIUM BLD-SCNC: 144 MMOL/L (ref 136–145)
SP GR UR STRIP: 1.03 (ref 1–1.03)
SQUAMOUS #/AREA URNS HPF: ABNORMAL /HPF
UROBILINOGEN UR QL STRIP: ABNORMAL
WBC NRBC COR # BLD: 15.8 10*3/MM3 (ref 3.4–10.8)
WBC UR QL AUTO: ABNORMAL /HPF

## 2019-10-25 PROCEDURE — 71045 X-RAY EXAM CHEST 1 VIEW: CPT

## 2019-10-25 PROCEDURE — 80053 COMPREHEN METABOLIC PANEL: CPT | Performed by: NURSE PRACTITIONER

## 2019-10-25 PROCEDURE — 87040 BLOOD CULTURE FOR BACTERIA: CPT | Performed by: INTERNAL MEDICINE

## 2019-10-25 PROCEDURE — 83690 ASSAY OF LIPASE: CPT | Performed by: NURSE PRACTITIONER

## 2019-10-25 PROCEDURE — 74018 RADEX ABDOMEN 1 VIEW: CPT

## 2019-10-25 PROCEDURE — 25010000002 PIPERACILLIN SOD-TAZOBACTAM PER 1 G: Performed by: INTERNAL MEDICINE

## 2019-10-25 PROCEDURE — 81001 URINALYSIS AUTO W/SCOPE: CPT | Performed by: SURGERY

## 2019-10-25 PROCEDURE — 25010000002 ONDANSETRON PER 1 MG: Performed by: NURSE PRACTITIONER

## 2019-10-25 PROCEDURE — 99233 SBSQ HOSP IP/OBS HIGH 50: CPT | Performed by: INTERNAL MEDICINE

## 2019-10-25 PROCEDURE — 99024 POSTOP FOLLOW-UP VISIT: CPT | Performed by: SURGERY

## 2019-10-25 PROCEDURE — 85025 COMPLETE CBC W/AUTO DIFF WBC: CPT | Performed by: NURSE PRACTITIONER

## 2019-10-25 PROCEDURE — 25010000002 PROMETHAZINE PER 50 MG: Performed by: NURSE PRACTITIONER

## 2019-10-25 RX ORDER — ONDANSETRON 2 MG/ML
8 INJECTION INTRAMUSCULAR; INTRAVENOUS ONCE
Status: COMPLETED | OUTPATIENT
Start: 2019-10-25 | End: 2019-10-25

## 2019-10-25 RX ORDER — PANTOPRAZOLE SODIUM 40 MG/10ML
40 INJECTION, POWDER, LYOPHILIZED, FOR SOLUTION INTRAVENOUS
Status: DISCONTINUED | OUTPATIENT
Start: 2019-10-25 | End: 2019-10-28

## 2019-10-25 RX ADMIN — ONDANSETRON 8 MG: 2 INJECTION INTRAMUSCULAR; INTRAVENOUS at 02:39

## 2019-10-25 RX ADMIN — PROMETHAZINE HYDROCHLORIDE 12.5 MG: 25 INJECTION INTRAMUSCULAR; INTRAVENOUS at 01:23

## 2019-10-25 RX ADMIN — PIPERACILLIN AND TAZOBACTAM 3.38 G: 3; .375 INJECTION, POWDER, LYOPHILIZED, FOR SOLUTION INTRAVENOUS at 19:39

## 2019-10-25 RX ADMIN — SODIUM CHLORIDE, SODIUM LACTATE, POTASSIUM CHLORIDE, AND CALCIUM CHLORIDE 100 ML/HR: 600; 310; 30; 20 INJECTION, SOLUTION INTRAVENOUS at 21:12

## 2019-10-25 RX ADMIN — PANTOPRAZOLE SODIUM 40 MG: 40 INJECTION, POWDER, FOR SOLUTION INTRAVENOUS at 12:30

## 2019-10-25 RX ADMIN — Medication 10 ML: at 21:09

## 2019-10-26 ENCOUNTER — APPOINTMENT (OUTPATIENT)
Dept: GENERAL RADIOLOGY | Facility: HOSPITAL | Age: 84
End: 2019-10-26

## 2019-10-26 LAB
ALBUMIN SERPL-MCNC: 2.9 G/DL (ref 3.5–5.2)
ALBUMIN/GLOB SERPL: 0.9 G/DL
ALP SERPL-CCNC: 97 U/L (ref 39–117)
ALT SERPL W P-5'-P-CCNC: 29 U/L (ref 1–41)
ANION GAP SERPL CALCULATED.3IONS-SCNC: 12 MMOL/L (ref 5–15)
AST SERPL-CCNC: 31 U/L (ref 1–40)
BASOPHILS # BLD AUTO: 0 10*3/MM3 (ref 0–0.2)
BASOPHILS NFR BLD AUTO: 0.2 % (ref 0–1.5)
BILIRUB SERPL-MCNC: 0.5 MG/DL (ref 0.2–1.2)
BUN BLD-MCNC: 24 MG/DL (ref 8–23)
BUN/CREAT SERPL: 28.6 (ref 7–25)
CALCIUM SPEC-SCNC: 8.5 MG/DL (ref 8.6–10.5)
CHLORIDE SERPL-SCNC: 105 MMOL/L (ref 98–107)
CO2 SERPL-SCNC: 27 MMOL/L (ref 22–29)
CREAT BLD-MCNC: 0.84 MG/DL (ref 0.76–1.27)
DEPRECATED RDW RBC AUTO: 44.2 FL (ref 37–54)
EOSINOPHIL # BLD AUTO: 0 10*3/MM3 (ref 0–0.4)
EOSINOPHIL NFR BLD AUTO: 0.1 % (ref 0.3–6.2)
ERYTHROCYTE [DISTWIDTH] IN BLOOD BY AUTOMATED COUNT: 13.6 % (ref 12.3–15.4)
GFR SERPL CREATININE-BSD FRML MDRD: 87 ML/MIN/1.73
GLOBULIN UR ELPH-MCNC: 3.4 GM/DL
GLUCOSE BLD-MCNC: 104 MG/DL (ref 65–99)
HCT VFR BLD AUTO: 36.6 % (ref 37.5–51)
HGB BLD-MCNC: 12.7 G/DL (ref 13–17.7)
LYMPHOCYTES # BLD AUTO: 1.1 10*3/MM3 (ref 0.7–3.1)
LYMPHOCYTES NFR BLD AUTO: 5.6 % (ref 19.6–45.3)
MAGNESIUM SERPL-MCNC: 1.9 MG/DL (ref 1.6–2.4)
MCH RBC QN AUTO: 32.2 PG (ref 26.6–33)
MCHC RBC AUTO-ENTMCNC: 34.6 G/DL (ref 31.5–35.7)
MCV RBC AUTO: 93 FL (ref 79–97)
MONOCYTES # BLD AUTO: 1.5 10*3/MM3 (ref 0.1–0.9)
MONOCYTES NFR BLD AUTO: 8 % (ref 5–12)
NEUTROPHILS # BLD AUTO: 16.4 10*3/MM3 (ref 1.7–7)
NEUTROPHILS NFR BLD AUTO: 86.1 % (ref 42.7–76)
NRBC BLD AUTO-RTO: 0 /100 WBC (ref 0–0.2)
PLATELET # BLD AUTO: 246 10*3/MM3 (ref 140–450)
PMV BLD AUTO: 9.2 FL (ref 6–12)
POTASSIUM BLD-SCNC: 3.7 MMOL/L (ref 3.5–5.2)
PROT SERPL-MCNC: 6.3 G/DL (ref 6–8.5)
RBC # BLD AUTO: 3.93 10*6/MM3 (ref 4.14–5.8)
SODIUM BLD-SCNC: 144 MMOL/L (ref 136–145)
WBC NRBC COR # BLD: 19 10*3/MM3 (ref 3.4–10.8)

## 2019-10-26 PROCEDURE — 99232 SBSQ HOSP IP/OBS MODERATE 35: CPT | Performed by: INTERNAL MEDICINE

## 2019-10-26 PROCEDURE — 74018 RADEX ABDOMEN 1 VIEW: CPT

## 2019-10-26 PROCEDURE — 85025 COMPLETE CBC W/AUTO DIFF WBC: CPT | Performed by: INTERNAL MEDICINE

## 2019-10-26 PROCEDURE — 25010000002 HYDRALAZINE PER 20 MG: Performed by: INTERNAL MEDICINE

## 2019-10-26 PROCEDURE — 80053 COMPREHEN METABOLIC PANEL: CPT | Performed by: INTERNAL MEDICINE

## 2019-10-26 PROCEDURE — 25010000002 PIPERACILLIN SOD-TAZOBACTAM PER 1 G: Performed by: INTERNAL MEDICINE

## 2019-10-26 PROCEDURE — 83735 ASSAY OF MAGNESIUM: CPT | Performed by: INTERNAL MEDICINE

## 2019-10-26 RX ADMIN — Medication 10 ML: at 21:23

## 2019-10-26 RX ADMIN — PIPERACILLIN AND TAZOBACTAM 3.38 G: 3; .375 INJECTION, POWDER, LYOPHILIZED, FOR SOLUTION INTRAVENOUS at 09:31

## 2019-10-26 RX ADMIN — HYDRALAZINE HYDROCHLORIDE 10 MG: 20 INJECTION INTRAMUSCULAR; INTRAVENOUS at 15:29

## 2019-10-26 RX ADMIN — PIPERACILLIN AND TAZOBACTAM 3.38 G: 3; .375 INJECTION, POWDER, LYOPHILIZED, FOR SOLUTION INTRAVENOUS at 00:50

## 2019-10-26 RX ADMIN — HYDRALAZINE HYDROCHLORIDE 10 MG: 20 INJECTION INTRAMUSCULAR; INTRAVENOUS at 22:51

## 2019-10-26 RX ADMIN — PANTOPRAZOLE SODIUM 40 MG: 40 INJECTION, POWDER, FOR SOLUTION INTRAVENOUS at 06:40

## 2019-10-26 RX ADMIN — Medication 10 ML: at 09:36

## 2019-10-26 RX ADMIN — PIPERACILLIN AND TAZOBACTAM 3.38 G: 3; .375 INJECTION, POWDER, LYOPHILIZED, FOR SOLUTION INTRAVENOUS at 23:35

## 2019-10-26 RX ADMIN — PIPERACILLIN AND TAZOBACTAM 3.38 G: 3; .375 INJECTION, POWDER, LYOPHILIZED, FOR SOLUTION INTRAVENOUS at 17:00

## 2019-10-27 LAB
ANION GAP SERPL CALCULATED.3IONS-SCNC: 15 MMOL/L (ref 5–15)
BUN BLD-MCNC: 22 MG/DL (ref 8–23)
BUN/CREAT SERPL: 23.4 (ref 7–25)
CALCIUM SPEC-SCNC: 8.1 MG/DL (ref 8.6–10.5)
CHLORIDE SERPL-SCNC: 109 MMOL/L (ref 98–107)
CO2 SERPL-SCNC: 23 MMOL/L (ref 22–29)
CREAT BLD-MCNC: 0.94 MG/DL (ref 0.76–1.27)
DEPRECATED RDW RBC AUTO: 46.8 FL (ref 37–54)
ERYTHROCYTE [DISTWIDTH] IN BLOOD BY AUTOMATED COUNT: 13.9 % (ref 12.3–15.4)
GFR SERPL CREATININE-BSD FRML MDRD: 77 ML/MIN/1.73
GIANT PLATELETS: ABNORMAL
GLUCOSE BLD-MCNC: 94 MG/DL (ref 65–99)
HCT VFR BLD AUTO: 39.4 % (ref 37.5–51)
HGB BLD-MCNC: 12.9 G/DL (ref 13–17.7)
LYMPHOCYTES # BLD MANUAL: 1.31 10*3/MM3 (ref 0.7–3.1)
LYMPHOCYTES NFR BLD MANUAL: 6 % (ref 19.6–45.3)
LYMPHOCYTES NFR BLD MANUAL: 6 % (ref 5–12)
MCH RBC QN AUTO: 30.8 PG (ref 26.6–33)
MCHC RBC AUTO-ENTMCNC: 32.6 G/DL (ref 31.5–35.7)
MCV RBC AUTO: 94.2 FL (ref 79–97)
MONOCYTES # BLD AUTO: 1.31 10*3/MM3 (ref 0.1–0.9)
MYELOCYTES NFR BLD MANUAL: 4 % (ref 0–0)
NEUTROPHILS # BLD AUTO: 18.31 10*3/MM3 (ref 1.7–7)
NEUTROPHILS NFR BLD MANUAL: 67 % (ref 42.7–76)
NEUTS BAND NFR BLD MANUAL: 17 % (ref 0–5)
NEUTS VAC BLD QL SMEAR: ABNORMAL
PLATELET # BLD AUTO: 316 10*3/MM3 (ref 140–450)
PMV BLD AUTO: 9.6 FL (ref 6–12)
POTASSIUM BLD-SCNC: 3.3 MMOL/L (ref 3.5–5.2)
RBC # BLD AUTO: 4.18 10*6/MM3 (ref 4.14–5.8)
RBC MORPH BLD: NORMAL
SCAN SLIDE: NORMAL
SODIUM BLD-SCNC: 147 MMOL/L (ref 136–145)
TOXIC GRANULATION: ABNORMAL
WBC NRBC COR # BLD: 21.8 10*3/MM3 (ref 3.4–10.8)

## 2019-10-27 PROCEDURE — 99232 SBSQ HOSP IP/OBS MODERATE 35: CPT | Performed by: INTERNAL MEDICINE

## 2019-10-27 PROCEDURE — 85007 BL SMEAR W/DIFF WBC COUNT: CPT | Performed by: INTERNAL MEDICINE

## 2019-10-27 PROCEDURE — 25010000002 PIPERACILLIN SOD-TAZOBACTAM PER 1 G: Performed by: INTERNAL MEDICINE

## 2019-10-27 PROCEDURE — 80048 BASIC METABOLIC PNL TOTAL CA: CPT | Performed by: INTERNAL MEDICINE

## 2019-10-27 PROCEDURE — 0097U HC BIOFIRE FILMARRAY GI PANEL: CPT | Performed by: INTERNAL MEDICINE

## 2019-10-27 PROCEDURE — 85025 COMPLETE CBC W/AUTO DIFF WBC: CPT | Performed by: INTERNAL MEDICINE

## 2019-10-27 RX ORDER — POTASSIUM CHLORIDE 20 MEQ/1
40 TABLET, EXTENDED RELEASE ORAL ONCE
Status: COMPLETED | OUTPATIENT
Start: 2019-10-27 | End: 2019-10-27

## 2019-10-27 RX ORDER — LISINOPRIL 5 MG/1
10 TABLET ORAL
Status: DISCONTINUED | OUTPATIENT
Start: 2019-10-27 | End: 2019-10-31 | Stop reason: HOSPADM

## 2019-10-27 RX ADMIN — LEVOTHYROXINE SODIUM 112 MCG: 112 TABLET ORAL at 05:33

## 2019-10-27 RX ADMIN — POTASSIUM CHLORIDE 40 MEQ: 1500 TABLET, EXTENDED RELEASE ORAL at 15:45

## 2019-10-27 RX ADMIN — Medication 10 ML: at 09:33

## 2019-10-27 RX ADMIN — Medication 10 ML: at 21:52

## 2019-10-27 RX ADMIN — PIPERACILLIN AND TAZOBACTAM 3.38 G: 3; .375 INJECTION, POWDER, LYOPHILIZED, FOR SOLUTION INTRAVENOUS at 15:45

## 2019-10-27 RX ADMIN — PIPERACILLIN AND TAZOBACTAM 3.38 G: 3; .375 INJECTION, POWDER, LYOPHILIZED, FOR SOLUTION INTRAVENOUS at 09:30

## 2019-10-27 RX ADMIN — PANTOPRAZOLE SODIUM 40 MG: 40 INJECTION, POWDER, FOR SOLUTION INTRAVENOUS at 05:32

## 2019-10-27 RX ADMIN — SODIUM CHLORIDE, SODIUM LACTATE, POTASSIUM CHLORIDE, AND CALCIUM CHLORIDE 75 ML/HR: 600; 310; 30; 20 INJECTION, SOLUTION INTRAVENOUS at 15:46

## 2019-10-27 RX ADMIN — LISINOPRIL 10 MG: 5 TABLET ORAL at 15:45

## 2019-10-28 LAB
ADV 40+41 DNA STL QL NAA+NON-PROBE: NOT DETECTED
ALBUMIN SERPL-MCNC: 2.8 G/DL (ref 3.5–5.2)
ALBUMIN/GLOB SERPL: 0.9 G/DL
ALP SERPL-CCNC: 82 U/L (ref 39–117)
ALT SERPL W P-5'-P-CCNC: 23 U/L (ref 1–41)
ANION GAP SERPL CALCULATED.3IONS-SCNC: 13 MMOL/L (ref 5–15)
AST SERPL-CCNC: 33 U/L (ref 1–40)
ASTRO TYP 1-8 RNA STL QL NAA+NON-PROBE: NOT DETECTED
BASOPHILS # BLD AUTO: 0.1 10*3/MM3 (ref 0–0.2)
BASOPHILS NFR BLD AUTO: 0.3 % (ref 0–1.5)
BILIRUB SERPL-MCNC: 0.4 MG/DL (ref 0.2–1.2)
BUN BLD-MCNC: 17 MG/DL (ref 8–23)
BUN/CREAT SERPL: 20.7 (ref 7–25)
C CAYETANENSIS DNA STL QL NAA+NON-PROBE: NOT DETECTED
C DIFF GDH STL QL: POSITIVE
CALCIUM SPEC-SCNC: 8.3 MG/DL (ref 8.6–10.5)
CAMPY SP DNA.DIARRHEA STL QL NAA+PROBE: NOT DETECTED
CHLORIDE SERPL-SCNC: 110 MMOL/L (ref 98–107)
CO2 SERPL-SCNC: 23 MMOL/L (ref 22–29)
CREAT BLD-MCNC: 0.82 MG/DL (ref 0.76–1.27)
CRYPTOSP STL CULT: NOT DETECTED
DEPRECATED RDW RBC AUTO: 45.5 FL (ref 37–54)
E COLI DNA SPEC QL NAA+PROBE: NOT DETECTED
E HISTOLYT AG STL-ACNC: NOT DETECTED
EAEC PAA PLAS AGGR+AATA ST NAA+NON-PRB: NOT DETECTED
EC STX1 + STX2 GENES STL NAA+PROBE: NOT DETECTED
EOSINOPHIL # BLD AUTO: 0.2 10*3/MM3 (ref 0–0.4)
EOSINOPHIL NFR BLD AUTO: 0.9 % (ref 0.3–6.2)
EPEC EAE GENE STL QL NAA+NON-PROBE: NOT DETECTED
ERYTHROCYTE [DISTWIDTH] IN BLOOD BY AUTOMATED COUNT: 14 % (ref 12.3–15.4)
ETEC LTA+ST1A+ST1B TOX ST NAA+NON-PROBE: NOT DETECTED
G LAMBLIA DNA SPEC QL NAA+PROBE: NOT DETECTED
GFR SERPL CREATININE-BSD FRML MDRD: 90 ML/MIN/1.73
GLOBULIN UR ELPH-MCNC: 3.2 GM/DL
GLUCOSE BLD-MCNC: 100 MG/DL (ref 65–99)
HCT VFR BLD AUTO: 37.6 % (ref 37.5–51)
HGB BLD-MCNC: 12.9 G/DL (ref 13–17.7)
LYMPHOCYTES # BLD AUTO: 2.3 10*3/MM3 (ref 0.7–3.1)
LYMPHOCYTES NFR BLD AUTO: 11 % (ref 19.6–45.3)
MCH RBC QN AUTO: 32 PG (ref 26.6–33)
MCHC RBC AUTO-ENTMCNC: 34.4 G/DL (ref 31.5–35.7)
MCV RBC AUTO: 93 FL (ref 79–97)
MONOCYTES # BLD AUTO: 1.2 10*3/MM3 (ref 0.1–0.9)
MONOCYTES NFR BLD AUTO: 5.8 % (ref 5–12)
NEUTROPHILS # BLD AUTO: 17 10*3/MM3 (ref 1.7–7)
NEUTROPHILS NFR BLD AUTO: 82 % (ref 42.7–76)
NOROVIRUS GI+II RNA STL QL NAA+NON-PROBE: NOT DETECTED
NRBC BLD AUTO-RTO: 0.2 /100 WBC (ref 0–0.2)
P SHIGELLOIDES DNA STL QL NAA+PROBE: NOT DETECTED
PLAT MORPH BLD: NORMAL
PLATELET # BLD AUTO: 344 10*3/MM3 (ref 140–450)
PMV BLD AUTO: 9.4 FL (ref 6–12)
POTASSIUM BLD-SCNC: 3.4 MMOL/L (ref 3.5–5.2)
PROT SERPL-MCNC: 6 G/DL (ref 6–8.5)
RBC # BLD AUTO: 4.05 10*6/MM3 (ref 4.14–5.8)
RBC MORPH BLD: NORMAL
RV RNA STL NAA+PROBE: NOT DETECTED
SALMONELLA DNA SPEC QL NAA+PROBE: NOT DETECTED
SAPO I+II+IV+V RNA STL QL NAA+NON-PROBE: NOT DETECTED
SHIGELLA SP+EIEC IPAH STL QL NAA+PROBE: NOT DETECTED
SODIUM BLD-SCNC: 146 MMOL/L (ref 136–145)
V CHOLERAE DNA SPEC QL NAA+PROBE: NOT DETECTED
VIBRIO DNA SPEC NAA+PROBE: NOT DETECTED
WBC MORPH BLD: NORMAL
WBC NRBC COR # BLD: 20.8 10*3/MM3 (ref 3.4–10.8)
YERSINIA STL CULT: NOT DETECTED

## 2019-10-28 PROCEDURE — 97530 THERAPEUTIC ACTIVITIES: CPT

## 2019-10-28 PROCEDURE — 97116 GAIT TRAINING THERAPY: CPT

## 2019-10-28 PROCEDURE — 97535 SELF CARE MNGMENT TRAINING: CPT

## 2019-10-28 PROCEDURE — 85007 BL SMEAR W/DIFF WBC COUNT: CPT | Performed by: INTERNAL MEDICINE

## 2019-10-28 PROCEDURE — 99024 POSTOP FOLLOW-UP VISIT: CPT | Performed by: SURGERY

## 2019-10-28 PROCEDURE — 25010000002 HYDRALAZINE PER 20 MG: Performed by: INTERNAL MEDICINE

## 2019-10-28 PROCEDURE — 87324 CLOSTRIDIUM AG IA: CPT | Performed by: SURGERY

## 2019-10-28 PROCEDURE — 99233 SBSQ HOSP IP/OBS HIGH 50: CPT | Performed by: HOSPITALIST

## 2019-10-28 PROCEDURE — 80053 COMPREHEN METABOLIC PANEL: CPT | Performed by: INTERNAL MEDICINE

## 2019-10-28 PROCEDURE — 25010000002 VANCOMYCIN 10 G RECONSTITUTED SOLUTION: Performed by: HOSPITALIST

## 2019-10-28 PROCEDURE — 85025 COMPLETE CBC W/AUTO DIFF WBC: CPT | Performed by: INTERNAL MEDICINE

## 2019-10-28 PROCEDURE — 87449 NOS EACH ORGANISM AG IA: CPT | Performed by: SURGERY

## 2019-10-28 PROCEDURE — 25010000002 PIPERACILLIN SOD-TAZOBACTAM PER 1 G: Performed by: INTERNAL MEDICINE

## 2019-10-28 RX ORDER — COLESEVELAM 180 1/1
1250 TABLET ORAL 2 TIMES DAILY WITH MEALS
Status: DISCONTINUED | OUTPATIENT
Start: 2019-10-28 | End: 2019-10-30

## 2019-10-28 RX ORDER — HYDRALAZINE HYDROCHLORIDE 25 MG/1
25 TABLET, FILM COATED ORAL EVERY 12 HOURS SCHEDULED
Status: DISCONTINUED | OUTPATIENT
Start: 2019-10-28 | End: 2019-10-31 | Stop reason: HOSPADM

## 2019-10-28 RX ORDER — FAMOTIDINE 20 MG/1
20 TABLET, FILM COATED ORAL
Status: DISCONTINUED | OUTPATIENT
Start: 2019-10-29 | End: 2019-10-31 | Stop reason: HOSPADM

## 2019-10-28 RX ORDER — SACCHAROMYCES BOULARDII 250 MG
250 CAPSULE ORAL 2 TIMES DAILY
Status: DISCONTINUED | OUTPATIENT
Start: 2019-10-28 | End: 2019-10-31 | Stop reason: HOSPADM

## 2019-10-28 RX ADMIN — HYDRALAZINE HYDROCHLORIDE 10 MG: 20 INJECTION INTRAMUSCULAR; INTRAVENOUS at 06:03

## 2019-10-28 RX ADMIN — HYDRALAZINE HYDROCHLORIDE 25 MG: 25 TABLET, FILM COATED ORAL at 22:29

## 2019-10-28 RX ADMIN — LEVOTHYROXINE SODIUM 112 MCG: 112 TABLET ORAL at 06:02

## 2019-10-28 RX ADMIN — PANTOPRAZOLE SODIUM 40 MG: 40 INJECTION, POWDER, FOR SOLUTION INTRAVENOUS at 06:02

## 2019-10-28 RX ADMIN — LISINOPRIL 10 MG: 5 TABLET ORAL at 08:47

## 2019-10-28 RX ADMIN — Medication 250 MG: at 22:28

## 2019-10-28 RX ADMIN — Medication 10 ML: at 08:48

## 2019-10-28 RX ADMIN — PIPERACILLIN AND TAZOBACTAM 3.38 G: 3; .375 INJECTION, POWDER, LYOPHILIZED, FOR SOLUTION INTRAVENOUS at 00:26

## 2019-10-28 RX ADMIN — VANCOMYCIN HYDROCHLORIDE 125 MG: 10 INJECTION, POWDER, LYOPHILIZED, FOR SOLUTION INTRAVENOUS at 17:58

## 2019-10-28 RX ADMIN — PIPERACILLIN AND TAZOBACTAM 3.38 G: 3; .375 INJECTION, POWDER, LYOPHILIZED, FOR SOLUTION INTRAVENOUS at 08:47

## 2019-10-28 RX ADMIN — Medication 10 ML: at 22:31

## 2019-10-28 RX ADMIN — COLESEVELAM HYDROCHLORIDE 1250 MG: 625 TABLET, FILM COATED ORAL at 17:58

## 2019-10-29 LAB
ANION GAP SERPL CALCULATED.3IONS-SCNC: 11 MMOL/L (ref 5–15)
BUN BLD-MCNC: 15 MG/DL (ref 8–23)
BUN/CREAT SERPL: 18.5 (ref 7–25)
CALCIUM SPEC-SCNC: 8 MG/DL (ref 8.6–10.5)
CHLORIDE SERPL-SCNC: 108 MMOL/L (ref 98–107)
CO2 SERPL-SCNC: 26 MMOL/L (ref 22–29)
CREAT BLD-MCNC: 0.81 MG/DL (ref 0.76–1.27)
DEPRECATED RDW RBC AUTO: 45.1 FL (ref 37–54)
EOSINOPHIL # BLD MANUAL: 0.19 10*3/MM3 (ref 0–0.4)
EOSINOPHIL NFR BLD MANUAL: 1 % (ref 0.3–6.2)
ERYTHROCYTE [DISTWIDTH] IN BLOOD BY AUTOMATED COUNT: 13.8 % (ref 12.3–15.4)
GFR SERPL CREATININE-BSD FRML MDRD: 91 ML/MIN/1.73
GLUCOSE BLD-MCNC: 108 MG/DL (ref 65–99)
HCT VFR BLD AUTO: 36 % (ref 37.5–51)
HGB BLD-MCNC: 12.3 G/DL (ref 13–17.7)
LARGE PLATELETS: ABNORMAL
LYMPHOCYTES # BLD MANUAL: 2.7 10*3/MM3 (ref 0.7–3.1)
LYMPHOCYTES NFR BLD MANUAL: 14 % (ref 19.6–45.3)
LYMPHOCYTES NFR BLD MANUAL: 3 % (ref 5–12)
MCH RBC QN AUTO: 31.9 PG (ref 26.6–33)
MCHC RBC AUTO-ENTMCNC: 34.2 G/DL (ref 31.5–35.7)
MCV RBC AUTO: 93.2 FL (ref 79–97)
MONOCYTES # BLD AUTO: 0.58 10*3/MM3 (ref 0.1–0.9)
NEUTROPHILS # BLD AUTO: 15.63 10*3/MM3 (ref 1.7–7)
NEUTROPHILS NFR BLD MANUAL: 72 % (ref 42.7–76)
NEUTS BAND NFR BLD MANUAL: 9 % (ref 0–5)
PLATELET # BLD AUTO: 351 10*3/MM3 (ref 140–450)
PMV BLD AUTO: 9.3 FL (ref 6–12)
POTASSIUM BLD-SCNC: 3.4 MMOL/L (ref 3.5–5.2)
RBC # BLD AUTO: 3.86 10*6/MM3 (ref 4.14–5.8)
RBC MORPH BLD: NORMAL
SCAN SLIDE: NORMAL
SODIUM BLD-SCNC: 145 MMOL/L (ref 136–145)
TOXIC GRANULATION: ABNORMAL
VARIANT LYMPHS NFR BLD MANUAL: 1 % (ref 0–5)
WBC NRBC COR # BLD: 19.3 10*3/MM3 (ref 3.4–10.8)

## 2019-10-29 PROCEDURE — 97530 THERAPEUTIC ACTIVITIES: CPT

## 2019-10-29 PROCEDURE — 85025 COMPLETE CBC W/AUTO DIFF WBC: CPT | Performed by: HOSPITALIST

## 2019-10-29 PROCEDURE — 85007 BL SMEAR W/DIFF WBC COUNT: CPT | Performed by: HOSPITALIST

## 2019-10-29 PROCEDURE — 99232 SBSQ HOSP IP/OBS MODERATE 35: CPT | Performed by: HOSPITALIST

## 2019-10-29 PROCEDURE — 80048 BASIC METABOLIC PNL TOTAL CA: CPT | Performed by: HOSPITALIST

## 2019-10-29 PROCEDURE — 99024 POSTOP FOLLOW-UP VISIT: CPT | Performed by: SURGERY

## 2019-10-29 PROCEDURE — 25010000002 VANCOMYCIN 10 G RECONSTITUTED SOLUTION: Performed by: HOSPITALIST

## 2019-10-29 RX ADMIN — HYDRALAZINE HYDROCHLORIDE 25 MG: 25 TABLET, FILM COATED ORAL at 20:00

## 2019-10-29 RX ADMIN — LEVOTHYROXINE SODIUM 112 MCG: 112 TABLET ORAL at 06:04

## 2019-10-29 RX ADMIN — COLESEVELAM HYDROCHLORIDE 1250 MG: 625 TABLET, FILM COATED ORAL at 22:27

## 2019-10-29 RX ADMIN — FAMOTIDINE 20 MG: 20 TABLET, FILM COATED ORAL at 20:00

## 2019-10-29 RX ADMIN — HYDRALAZINE HYDROCHLORIDE 25 MG: 25 TABLET, FILM COATED ORAL at 09:32

## 2019-10-29 RX ADMIN — VANCOMYCIN HYDROCHLORIDE 125 MG: 10 INJECTION, POWDER, LYOPHILIZED, FOR SOLUTION INTRAVENOUS at 12:46

## 2019-10-29 RX ADMIN — VANCOMYCIN HYDROCHLORIDE 125 MG: 10 INJECTION, POWDER, LYOPHILIZED, FOR SOLUTION INTRAVENOUS at 20:00

## 2019-10-29 RX ADMIN — COLESEVELAM HYDROCHLORIDE 1250 MG: 625 TABLET, FILM COATED ORAL at 07:50

## 2019-10-29 RX ADMIN — VANCOMYCIN HYDROCHLORIDE 125 MG: 10 INJECTION, POWDER, LYOPHILIZED, FOR SOLUTION INTRAVENOUS at 06:04

## 2019-10-29 RX ADMIN — Medication 10 ML: at 09:32

## 2019-10-29 RX ADMIN — LISINOPRIL 10 MG: 5 TABLET ORAL at 09:32

## 2019-10-29 RX ADMIN — VANCOMYCIN HYDROCHLORIDE 125 MG: 10 INJECTION, POWDER, LYOPHILIZED, FOR SOLUTION INTRAVENOUS at 00:07

## 2019-10-29 RX ADMIN — FAMOTIDINE 20 MG: 20 TABLET, FILM COATED ORAL at 07:50

## 2019-10-29 RX ADMIN — Medication 250 MG: at 09:32

## 2019-10-29 RX ADMIN — Medication 250 MG: at 20:00

## 2019-10-30 LAB
ANION GAP SERPL CALCULATED.3IONS-SCNC: 11 MMOL/L (ref 5–15)
BACTERIA SPEC AEROBE CULT: NORMAL
BACTERIA SPEC AEROBE CULT: NORMAL
BUN BLD-MCNC: 13 MG/DL (ref 8–23)
BUN/CREAT SERPL: 17.1 (ref 7–25)
BURR CELLS BLD QL SMEAR: ABNORMAL
CALCIUM SPEC-SCNC: 7.9 MG/DL (ref 8.6–10.5)
CHLORIDE SERPL-SCNC: 105 MMOL/L (ref 98–107)
CO2 SERPL-SCNC: 24 MMOL/L (ref 22–29)
CREAT BLD-MCNC: 0.76 MG/DL (ref 0.76–1.27)
DACRYOCYTES BLD QL SMEAR: ABNORMAL
DEPRECATED RDW RBC AUTO: 45.1 FL (ref 37–54)
EOSINOPHIL # BLD MANUAL: 0.51 10*3/MM3 (ref 0–0.4)
EOSINOPHIL NFR BLD MANUAL: 3 % (ref 0.3–6.2)
ERYTHROCYTE [DISTWIDTH] IN BLOOD BY AUTOMATED COUNT: 13.7 % (ref 12.3–15.4)
GFR SERPL CREATININE-BSD FRML MDRD: 98 ML/MIN/1.73
GLUCOSE BLD-MCNC: 98 MG/DL (ref 65–99)
HCT VFR BLD AUTO: 38.3 % (ref 37.5–51)
HGB BLD-MCNC: 12.7 G/DL (ref 13–17.7)
LARGE PLATELETS: ABNORMAL
LYMPHOCYTES # BLD MANUAL: 2.04 10*3/MM3 (ref 0.7–3.1)
LYMPHOCYTES NFR BLD MANUAL: 12 % (ref 19.6–45.3)
LYMPHOCYTES NFR BLD MANUAL: 3 % (ref 5–12)
MCH RBC QN AUTO: 30.8 PG (ref 26.6–33)
MCHC RBC AUTO-ENTMCNC: 33.2 G/DL (ref 31.5–35.7)
MCV RBC AUTO: 92.8 FL (ref 79–97)
METAMYELOCYTES NFR BLD MANUAL: 4 % (ref 0–0)
MONOCYTES # BLD AUTO: 0.51 10*3/MM3 (ref 0.1–0.9)
MYELOCYTES NFR BLD MANUAL: 1 % (ref 0–0)
NEUTROPHILS # BLD AUTO: 12.75 10*3/MM3 (ref 1.7–7)
NEUTROPHILS NFR BLD MANUAL: 68 % (ref 42.7–76)
NEUTS BAND NFR BLD MANUAL: 7 % (ref 0–5)
PLATELET # BLD AUTO: 359 10*3/MM3 (ref 140–450)
PMV BLD AUTO: 9.9 FL (ref 6–12)
POIKILOCYTOSIS BLD QL SMEAR: ABNORMAL
POTASSIUM BLD-SCNC: 3.4 MMOL/L (ref 3.5–5.2)
RBC # BLD AUTO: 4.13 10*6/MM3 (ref 4.14–5.8)
SCAN SLIDE: NORMAL
SMALL PLATELETS BLD QL SMEAR: ADEQUATE
SODIUM BLD-SCNC: 140 MMOL/L (ref 136–145)
VARIANT LYMPHS NFR BLD MANUAL: 2 % (ref 0–5)
WBC MORPH BLD: NORMAL
WBC NRBC COR # BLD: 17 10*3/MM3 (ref 3.4–10.8)

## 2019-10-30 PROCEDURE — 97116 GAIT TRAINING THERAPY: CPT

## 2019-10-30 PROCEDURE — 85025 COMPLETE CBC W/AUTO DIFF WBC: CPT | Performed by: HOSPITALIST

## 2019-10-30 PROCEDURE — 85007 BL SMEAR W/DIFF WBC COUNT: CPT | Performed by: HOSPITALIST

## 2019-10-30 PROCEDURE — 97530 THERAPEUTIC ACTIVITIES: CPT

## 2019-10-30 PROCEDURE — 25010000002 VANCOMYCIN 10 G RECONSTITUTED SOLUTION: Performed by: HOSPITALIST

## 2019-10-30 PROCEDURE — 80048 BASIC METABOLIC PNL TOTAL CA: CPT | Performed by: HOSPITALIST

## 2019-10-30 PROCEDURE — 99024 POSTOP FOLLOW-UP VISIT: CPT | Performed by: SURGERY

## 2019-10-30 PROCEDURE — 99232 SBSQ HOSP IP/OBS MODERATE 35: CPT | Performed by: HOSPITALIST

## 2019-10-30 RX ORDER — COLESEVELAM 180 1/1
1250 TABLET ORAL 2 TIMES DAILY PRN
Status: DISCONTINUED | OUTPATIENT
Start: 2019-10-30 | End: 2019-10-31 | Stop reason: HOSPADM

## 2019-10-30 RX ADMIN — Medication 250 MG: at 07:55

## 2019-10-30 RX ADMIN — LEVOTHYROXINE SODIUM 112 MCG: 112 TABLET ORAL at 05:13

## 2019-10-30 RX ADMIN — HYDRALAZINE HYDROCHLORIDE 25 MG: 25 TABLET, FILM COATED ORAL at 08:53

## 2019-10-30 RX ADMIN — Medication 250 MG: at 08:53

## 2019-10-30 RX ADMIN — FAMOTIDINE 20 MG: 20 TABLET, FILM COATED ORAL at 17:30

## 2019-10-30 RX ADMIN — VANCOMYCIN HYDROCHLORIDE 125 MG: 10 INJECTION, POWDER, LYOPHILIZED, FOR SOLUTION INTRAVENOUS at 05:13

## 2019-10-30 RX ADMIN — Medication 250 MG: at 23:13

## 2019-10-30 RX ADMIN — LISINOPRIL 10 MG: 5 TABLET ORAL at 08:53

## 2019-10-30 RX ADMIN — COLESEVELAM HYDROCHLORIDE 1250 MG: 625 TABLET, FILM COATED ORAL at 07:52

## 2019-10-30 RX ADMIN — VANCOMYCIN HYDROCHLORIDE 125 MG: 10 INJECTION, POWDER, LYOPHILIZED, FOR SOLUTION INTRAVENOUS at 23:13

## 2019-10-30 RX ADMIN — VANCOMYCIN HYDROCHLORIDE 125 MG: 10 INJECTION, POWDER, LYOPHILIZED, FOR SOLUTION INTRAVENOUS at 17:30

## 2019-10-30 RX ADMIN — VANCOMYCIN HYDROCHLORIDE 125 MG: 10 INJECTION, POWDER, LYOPHILIZED, FOR SOLUTION INTRAVENOUS at 12:10

## 2019-10-30 RX ADMIN — VANCOMYCIN HYDROCHLORIDE 125 MG: 10 INJECTION, POWDER, LYOPHILIZED, FOR SOLUTION INTRAVENOUS at 01:35

## 2019-10-30 RX ADMIN — Medication 10 ML: at 08:54

## 2019-10-30 RX ADMIN — LISINOPRIL 10 MG: 5 TABLET ORAL at 07:56

## 2019-10-30 RX ADMIN — FAMOTIDINE 20 MG: 20 TABLET, FILM COATED ORAL at 07:52

## 2019-10-30 RX ADMIN — HYDRALAZINE HYDROCHLORIDE 25 MG: 25 TABLET, FILM COATED ORAL at 07:56

## 2019-10-30 RX ADMIN — Medication 10 ML: at 23:16

## 2019-10-31 VITALS
RESPIRATION RATE: 16 BRPM | WEIGHT: 154.98 LBS | HEART RATE: 74 BPM | DIASTOLIC BLOOD PRESSURE: 76 MMHG | OXYGEN SATURATION: 94 % | TEMPERATURE: 97.6 F | BODY MASS INDEX: 23.49 KG/M2 | SYSTOLIC BLOOD PRESSURE: 147 MMHG | HEIGHT: 68 IN

## 2019-10-31 PROBLEM — A04.72 COLITIS DUE TO CLOSTRIDIUM DIFFICILE: Status: RESOLVED | Noted: 2019-10-31 | Resolved: 2019-10-31

## 2019-10-31 PROBLEM — K42.9 UMBILICAL HERNIA WITHOUT OBSTRUCTION AND WITHOUT GANGRENE: Chronic | Status: RESOLVED | Noted: 2019-10-20 | Resolved: 2019-10-31

## 2019-10-31 PROBLEM — K56.609 BOWEL OBSTRUCTION: Status: RESOLVED | Noted: 2019-10-20 | Resolved: 2019-10-31

## 2019-10-31 PROBLEM — K43.2 INCISIONAL HERNIA, WITHOUT OBSTRUCTION OR GANGRENE: Chronic | Status: RESOLVED | Noted: 2019-10-20 | Resolved: 2019-10-31

## 2019-10-31 PROBLEM — A04.72 COLITIS DUE TO CLOSTRIDIUM DIFFICILE: Status: ACTIVE | Noted: 2019-10-31

## 2019-10-31 PROBLEM — K91.30 SMALL BOWEL OBSTRUCTION DUE TO POSTOPERATIVE ADHESIONS: Status: RESOLVED | Noted: 2019-10-20 | Resolved: 2019-10-31

## 2019-10-31 LAB
ANION GAP SERPL CALCULATED.3IONS-SCNC: 8 MMOL/L (ref 5–15)
BUN BLD-MCNC: 12 MG/DL (ref 8–23)
BUN/CREAT SERPL: 14 (ref 7–25)
CALCIUM SPEC-SCNC: 7.9 MG/DL (ref 8.6–10.5)
CHLORIDE SERPL-SCNC: 104 MMOL/L (ref 98–107)
CO2 SERPL-SCNC: 27 MMOL/L (ref 22–29)
CREAT BLD-MCNC: 0.86 MG/DL (ref 0.76–1.27)
DEPRECATED RDW RBC AUTO: 44.6 FL (ref 37–54)
EOSINOPHIL # BLD MANUAL: 0.67 10*3/MM3 (ref 0–0.4)
EOSINOPHIL NFR BLD MANUAL: 4 % (ref 0.3–6.2)
ERYTHROCYTE [DISTWIDTH] IN BLOOD BY AUTOMATED COUNT: 13.6 % (ref 12.3–15.4)
GFR SERPL CREATININE-BSD FRML MDRD: 85 ML/MIN/1.73
GLUCOSE BLD-MCNC: 106 MG/DL (ref 65–99)
HCT VFR BLD AUTO: 36.5 % (ref 37.5–51)
HGB BLD-MCNC: 12.5 G/DL (ref 13–17.7)
LYMPHOCYTES # BLD MANUAL: 1.18 10*3/MM3 (ref 0.7–3.1)
LYMPHOCYTES NFR BLD MANUAL: 7 % (ref 19.6–45.3)
LYMPHOCYTES NFR BLD MANUAL: 7 % (ref 5–12)
MAGNESIUM SERPL-MCNC: 1.9 MG/DL (ref 1.6–2.4)
MCH RBC QN AUTO: 32.1 PG (ref 26.6–33)
MCHC RBC AUTO-ENTMCNC: 34.4 G/DL (ref 31.5–35.7)
MCV RBC AUTO: 93.3 FL (ref 79–97)
METAMYELOCYTES NFR BLD MANUAL: 3 % (ref 0–0)
MONOCYTES # BLD AUTO: 1.18 10*3/MM3 (ref 0.1–0.9)
MYELOCYTES NFR BLD MANUAL: 1 % (ref 0–0)
NEUTROPHILS # BLD AUTO: 13.1 10*3/MM3 (ref 1.7–7)
NEUTROPHILS NFR BLD MANUAL: 69 % (ref 42.7–76)
NEUTS BAND NFR BLD MANUAL: 9 % (ref 0–5)
PLAT MORPH BLD: NORMAL
PLATELET # BLD AUTO: 362 10*3/MM3 (ref 140–450)
PMV BLD AUTO: 9 FL (ref 6–12)
POTASSIUM BLD-SCNC: 3.7 MMOL/L (ref 3.5–5.2)
RBC # BLD AUTO: 3.91 10*6/MM3 (ref 4.14–5.8)
RBC MORPH BLD: NORMAL
SCAN SLIDE: NORMAL
SODIUM BLD-SCNC: 139 MMOL/L (ref 136–145)
WBC MORPH BLD: NORMAL
WBC NRBC COR # BLD: 16.8 10*3/MM3 (ref 3.4–10.8)

## 2019-10-31 PROCEDURE — 83735 ASSAY OF MAGNESIUM: CPT | Performed by: HOSPITALIST

## 2019-10-31 PROCEDURE — 85007 BL SMEAR W/DIFF WBC COUNT: CPT | Performed by: HOSPITALIST

## 2019-10-31 PROCEDURE — 99239 HOSP IP/OBS DSCHRG MGMT >30: CPT | Performed by: HOSPITALIST

## 2019-10-31 PROCEDURE — 80048 BASIC METABOLIC PNL TOTAL CA: CPT | Performed by: HOSPITALIST

## 2019-10-31 PROCEDURE — 25010000002 VANCOMYCIN 10 G RECONSTITUTED SOLUTION: Performed by: HOSPITALIST

## 2019-10-31 PROCEDURE — 85025 COMPLETE CBC W/AUTO DIFF WBC: CPT | Performed by: HOSPITALIST

## 2019-10-31 RX ORDER — SACCHAROMYCES BOULARDII 250 MG
250 CAPSULE ORAL 2 TIMES DAILY
Qty: 22 CAPSULE | Refills: 0 | Status: SHIPPED | OUTPATIENT
Start: 2019-10-31 | End: 2021-02-25

## 2019-10-31 RX ORDER — HYDRALAZINE HYDROCHLORIDE 25 MG/1
25 TABLET, FILM COATED ORAL EVERY 12 HOURS SCHEDULED
Qty: 60 TABLET | Refills: 0 | Status: SHIPPED | OUTPATIENT
Start: 2019-10-31 | End: 2021-02-25

## 2019-10-31 RX ORDER — LISINOPRIL 10 MG/1
10 TABLET ORAL
Qty: 60 TABLET | Refills: 0 | Status: SHIPPED | OUTPATIENT
Start: 2019-11-01 | End: 2021-02-25

## 2019-10-31 RX ADMIN — HYDRALAZINE HYDROCHLORIDE 25 MG: 25 TABLET, FILM COATED ORAL at 08:45

## 2019-10-31 RX ADMIN — VANCOMYCIN HYDROCHLORIDE 125 MG: 10 INJECTION, POWDER, LYOPHILIZED, FOR SOLUTION INTRAVENOUS at 06:10

## 2019-10-31 RX ADMIN — LISINOPRIL 10 MG: 5 TABLET ORAL at 08:45

## 2019-10-31 RX ADMIN — Medication 250 MG: at 08:45

## 2019-10-31 RX ADMIN — LEVOTHYROXINE SODIUM 112 MCG: 112 TABLET ORAL at 06:09

## 2019-10-31 RX ADMIN — FAMOTIDINE 20 MG: 20 TABLET, FILM COATED ORAL at 06:55

## 2019-10-31 RX ADMIN — Medication 10 ML: at 08:52

## 2019-10-31 RX ADMIN — VANCOMYCIN HYDROCHLORIDE 125 MG: 10 INJECTION, POWDER, LYOPHILIZED, FOR SOLUTION INTRAVENOUS at 11:42

## 2019-11-01 ENCOUNTER — READMISSION MANAGEMENT (OUTPATIENT)
Dept: CALL CENTER | Facility: HOSPITAL | Age: 84
End: 2019-11-01

## 2019-11-01 NOTE — OUTREACH NOTE
Prep Survey      Responses   Facility patient discharged from?  [facility]   Is patient eligible?  Yes   Discharge diagnosis  Acute C. difficile colitis, SBO, s/p Exploratory laparotomy with extensive lysis of adhesions, Explant of previous hernia mesh, Bilateral subcutaneous skin flaps roughly 10 cm, Right sided external oblique release, Ventral hernia repair with mesh underlay   Does the patient have one of the following disease processes/diagnoses(primary or secondary)?  General Surgery   Does the patient have Home health ordered?  Yes   What is the Home health agency?   [facility]    Is there a DME ordered?  No   Prep survey completed?  Yes          Argentina Neely RN

## 2019-11-01 NOTE — PROGRESS NOTES
Case Management Discharge Note    Final Note: Routine home. Declined Rehab. No PCP, [facility] attempting to get set up with [facility] and will arrange Home Health after discharge.       Final Discharge Disposition Code: 01 - home or self-care

## 2019-11-05 ENCOUNTER — READMISSION MANAGEMENT (OUTPATIENT)
Dept: CALL CENTER | Facility: HOSPITAL | Age: 84
End: 2019-11-05

## 2019-11-05 NOTE — OUTREACH NOTE
General Surgery Week 1 Survey      Responses   Facility patient discharged from?  [facility]   Does the patient have one of the following disease processes/diagnoses(primary or secondary)?  General Surgery   Is there a successful TCM telephone encounter documented?  No   Week 1 attempt successful?  No   Revoke  Decline to participate [No answer/Left voicemail]          Princess Becerra LPN

## 2019-11-07 ENCOUNTER — OFFICE VISIT (OUTPATIENT)
Dept: SURGERY | Facility: CLINIC | Age: 84
End: 2019-11-07

## 2019-11-07 VITALS
WEIGHT: 153.4 LBS | TEMPERATURE: 97.9 F | SYSTOLIC BLOOD PRESSURE: 105 MMHG | HEIGHT: 67 IN | BODY MASS INDEX: 24.08 KG/M2 | DIASTOLIC BLOOD PRESSURE: 68 MMHG | HEART RATE: 95 BPM

## 2019-11-07 DIAGNOSIS — K46.0 INCARCERATED HERNIA: Primary | ICD-10-CM

## 2019-11-07 PROCEDURE — 99024 POSTOP FOLLOW-UP VISIT: CPT | Performed by: SURGERY

## 2019-11-07 RX ORDER — ASPIRIN 81 MG/1
81 TABLET, CHEWABLE ORAL DAILY
COMMUNITY

## 2019-11-07 NOTE — PROGRESS NOTES
"Subjective   Christiano Guzmán is a 84 y.o. male.   84-year-old man who is about 2 weeks out from exploratory laparotomy for a large recurrent incarcerated ventral hernia.  I performed the lysis of adhesions small bowel resection external oblique release and ventral hernia repair with a Vicryl mesh.  His postoperative course was fairly uneventful however he did have a leukocytosis and was diagnosed with C. difficile and was placed on oral antibiotics for treatment.  He is here today for his first outpatient follow-up.    He was able to be discharged home from the hospital and has been doing reasonably well.  He says he has been eating and drinking okay and having about 2-3 bowel movements that are small but occurring daily.  He denies any significant incisional pain or drainage.  He has continued to keep up with his lifting restrictions.  He still has about 1 week left of his antibiotic therapy for his C. difficile colitis        Objective   /68   Pulse 95   Temp 97.9 °F (36.6 °C) (Oral)   Ht 170.2 cm (67\")   Wt 69.6 kg (153 lb 6.4 oz)   BMI 24.03 kg/m²   Physical Exam   Constitutional: He appears well-developed and well-nourished.   HENT:   Head: Normocephalic and atraumatic.   Pulmonary/Chest: Effort normal. No respiratory distress.   Abdominal:   Incision is healing well with no erythema or drainage in the right mid abdomen there is some firm tissue likely where the large hernia sac was but there is no evidence of recurrent hernia or infection   Neurological: He is alert. No cranial nerve deficit.   Skin: Skin is warm and dry.   Psychiatric: He has a normal mood and affect. His behavior is normal.       Assessment/Plan   Christiano was seen today for post-op.    Diagnoses and all orders for this visit:    Incarcerated hernia    About 2 weeks out from exploratory laparotomy small bowel resection ventral hernia repair for incarcerated recurrent abdominal hernia.  He is doing well.  We will have him continue " his lifting restrictions no more than 15 pounds and come back to the office in 4 weeks.    Poncho Nelson MD  11/7/2019  1:10 PM

## 2019-11-11 ENCOUNTER — OFFICE VISIT (OUTPATIENT)
Dept: FAMILY MEDICINE CLINIC | Facility: CLINIC | Age: 84
End: 2019-11-11

## 2019-11-11 VITALS
BODY MASS INDEX: 23.92 KG/M2 | SYSTOLIC BLOOD PRESSURE: 131 MMHG | DIASTOLIC BLOOD PRESSURE: 83 MMHG | HEIGHT: 67 IN | WEIGHT: 152.4 LBS | TEMPERATURE: 98.4 F | OXYGEN SATURATION: 97 % | HEART RATE: 95 BPM

## 2019-11-11 DIAGNOSIS — K43.6 INCARCERATED VENTRAL HERNIA: Primary | Chronic | ICD-10-CM

## 2019-11-11 DIAGNOSIS — A04.72 C. DIFFICILE DIARRHEA: ICD-10-CM

## 2019-11-11 DIAGNOSIS — I10 ESSENTIAL HYPERTENSION: ICD-10-CM

## 2019-11-11 DIAGNOSIS — N40.1 BENIGN PROSTATIC HYPERPLASIA WITH WEAK URINARY STREAM: Chronic | ICD-10-CM

## 2019-11-11 DIAGNOSIS — R39.12 BENIGN PROSTATIC HYPERPLASIA WITH WEAK URINARY STREAM: Chronic | ICD-10-CM

## 2019-11-11 PROCEDURE — 99204 OFFICE O/P NEW MOD 45 MIN: CPT | Performed by: NURSE PRACTITIONER

## 2019-11-12 PROBLEM — N40.1 BENIGN PROSTATIC HYPERPLASIA WITH WEAK URINARY STREAM: Chronic | Status: ACTIVE | Noted: 2019-11-12

## 2019-11-12 PROBLEM — R39.12 BENIGN PROSTATIC HYPERPLASIA WITH WEAK URINARY STREAM: Chronic | Status: ACTIVE | Noted: 2019-11-12

## 2019-11-12 PROBLEM — K43.6 INCARCERATED VENTRAL HERNIA: Chronic | Status: ACTIVE | Noted: 2019-11-12

## 2019-11-12 NOTE — PROGRESS NOTES
Subjective   Christiano Guzmán is a 84 y.o. male.      84-year-old white male with history of hypertension, BPH, hypothyroidism, with recent hospitalization and surgery for incarcerated hernia that involved hernia repair and removal of 24 inches of intestines.  Patient's incision is well approximated with exception of the distal in the wear a DE pants rub the incision that dehised  slightly with a scab.  He states he is having bowel movements that are pasty.  He had C diff in the hospital is currently on vancomycin solution.  He has a follow-up visit in and 1-2 weeks    his hospital blood work was for the most part pretty normal had some mild anemia   blood pressure 130/82 heart rate 94 he denies any chest pain, dyspnea, tachycardia, dizziness or edema  Weight is 152   Patient has not had any medical care in years until this surgery.    He sees an endocrinologist for his thyroid problem and has a urologist for his prostate issues   S         The following portions of the patient's history were reviewed and updated as appropriate: allergies, current medications, past family history, past medical history, past social history, past surgical history and problem list.    Review of Systems   Constitutional: Negative.    HENT: Negative.    Respiratory: Negative.    Cardiovascular: Negative.    Gastrointestinal:        Pasty stools   Genitourinary: Negative.    Musculoskeletal: Negative.    Skin: Negative.    Neurological: Negative.    Psychiatric/Behavioral: Negative.        Objective   Physical Exam   Constitutional: He is oriented to person, place, and time. He appears well-developed and well-nourished.   Cardiovascular: Normal rate and regular rhythm.   Pulmonary/Chest: Effort normal and breath sounds normal.   Abdominal: Soft. Bowel sounds are normal.   Musculoskeletal:   Ambulates with a cane   Neurological: He is alert and oriented to person, place, and time.   Skin:   Lower abdominal incision appears to be healing  well except for the very distal end wear her waist of pants rubbing the incision recommended they Wear pajamas or something loose fitting   Psychiatric: He has a normal mood and affect.   Mild dementia         Assessment/Plan   Christiano was seen today for establish care.    Diagnoses and all orders for this visit:    Incarcerated ventral hernia    Benign prostatic hyperplasia with weak urinary stream    C. difficile diarrhea    Essential hypertension

## 2019-12-05 ENCOUNTER — OFFICE VISIT (OUTPATIENT)
Dept: SURGERY | Facility: CLINIC | Age: 84
End: 2019-12-05

## 2019-12-05 VITALS
HEART RATE: 83 BPM | HEIGHT: 67 IN | TEMPERATURE: 97.3 F | SYSTOLIC BLOOD PRESSURE: 112 MMHG | OXYGEN SATURATION: 98 % | BODY MASS INDEX: 24.08 KG/M2 | DIASTOLIC BLOOD PRESSURE: 63 MMHG | WEIGHT: 153.4 LBS

## 2019-12-05 DIAGNOSIS — K46.0 INCARCERATED HERNIA: Primary | ICD-10-CM

## 2019-12-05 PROCEDURE — 99024 POSTOP FOLLOW-UP VISIT: CPT | Performed by: SURGERY

## 2019-12-05 NOTE — PROGRESS NOTES
Subjective   Christiano Guzmán is a 84 y.o. male.   84-year-old man who is about 6 weeks out from exploratory laparotomy reduction of hernia small bowel resection and right sided component release with underlay Vicryl mesh.  He is here today for his second postoperative visit.    Says he is eating and drinking well having regular bowel function no significant incisional pain and no noticeable incisional bulging.  Objective   There were no vitals taken for this visit.  Physical Exam   Constitutional: He appears well-developed and well-nourished.   HENT:   Head: Normocephalic and atraumatic.   Pulmonary/Chest: Effort normal. No respiratory distress.   Abdominal: Soft.   Incisions healing well without any evidence of recurrent hernia   Skin: Skin is warm and dry.       Assessment/Plan   Diagnoses and all orders for this visit:    Incarcerated hernia    About 6 weeks out from complex abdominal hernia repair with incarcerated small bowel requiring bowel resection.  Abdominal binder as needed  Lifting restrictions have been lifted  Can follow-up as needed    Poncho Nelson MD  12/5/2019  1:04 PM

## 2020-02-13 ENCOUNTER — OFFICE VISIT (OUTPATIENT)
Dept: FAMILY MEDICINE CLINIC | Facility: CLINIC | Age: 85
End: 2020-02-13

## 2020-02-13 VITALS
TEMPERATURE: 97.6 F | DIASTOLIC BLOOD PRESSURE: 83 MMHG | HEART RATE: 71 BPM | SYSTOLIC BLOOD PRESSURE: 133 MMHG | HEIGHT: 67 IN | BODY MASS INDEX: 24.96 KG/M2 | WEIGHT: 159 LBS | OXYGEN SATURATION: 96 %

## 2020-02-13 DIAGNOSIS — E07.9 DISORDER OF THYROID, UNSPECIFIED: ICD-10-CM

## 2020-02-13 DIAGNOSIS — E87.6 HYPOKALEMIA: ICD-10-CM

## 2020-02-13 DIAGNOSIS — D50.9 IRON DEFICIENCY ANEMIA, UNSPECIFIED IRON DEFICIENCY ANEMIA TYPE: Primary | ICD-10-CM

## 2020-02-13 DIAGNOSIS — E03.9 ACQUIRED HYPOTHYROIDISM: Chronic | ICD-10-CM

## 2020-02-13 DIAGNOSIS — D72.829 LEUKOCYTOSIS, UNSPECIFIED TYPE: ICD-10-CM

## 2020-02-13 DIAGNOSIS — K43.6 INCARCERATED VENTRAL HERNIA: Chronic | ICD-10-CM

## 2020-02-13 DIAGNOSIS — N40.1 BENIGN PROSTATIC HYPERPLASIA WITH WEAK URINARY STREAM: Chronic | ICD-10-CM

## 2020-02-13 DIAGNOSIS — Z12.5 ENCOUNTER FOR SCREENING FOR MALIGNANT NEOPLASM OF PROSTATE: ICD-10-CM

## 2020-02-13 DIAGNOSIS — R39.12 BENIGN PROSTATIC HYPERPLASIA WITH WEAK URINARY STREAM: Chronic | ICD-10-CM

## 2020-02-13 PROCEDURE — 99214 OFFICE O/P EST MOD 30 MIN: CPT | Performed by: NURSE PRACTITIONER

## 2020-02-13 RX ORDER — LEVOTHYROXINE SODIUM 112 UG/1
112 TABLET ORAL DAILY
Qty: 90 TABLET | Refills: 2 | Status: SHIPPED | OUTPATIENT
Start: 2020-02-13 | End: 2020-02-15 | Stop reason: ALTCHOICE

## 2020-02-13 NOTE — PROGRESS NOTES
"Christiano Guzmán is a 84 y.o. male.      84-year-old white male with history of hypertension, BPH, hypothyroidism, incarcerated hernia with repair recently who comes in today for follow-up visit.  Patient has done all his cervical follow-up states he is doing well with no complications.   patient denies any new problems.  Blood pressure 132/82 heart rate 70 he denies any chest pain, dyspnea, tachycardia or dizziness  Weight is up 6 lb at 159   patient is up-to-date on eye exams no longer does colonoscopies  On hospital blood work patient was anemic with hypokalemia and mild renal failure I will be checking these today along with his thyroid levels     blood work today         The following portions of the patient's history were reviewed and updated as appropriate: allergies, current medications, past family history, past medical history, past social history, past surgical history and problem list.    Vitals:    02/13/20 0806   BP: 133/83   BP Location: Right arm   Patient Position: Sitting   Cuff Size: Adult   Pulse: 71   Temp: 97.6 °F (36.4 °C)   TempSrc: Oral   SpO2: 96%   Weight: 72.1 kg (159 lb)   Height: 170.2 cm (67\")     Body mass index is 24.9 kg/m².    Past Medical History:   Diagnosis Date   • Disease of thyroid gland      Past Surgical History:   Procedure Laterality Date   • APPENDECTOMY     • CYSTOSCOPY N/A 10/22/2019    Procedure: CYSTOSCOPY FLEXIBLE AT BEDSIDE WITH MCGARRY CATHETER PLACEMENT;  Surgeon: Mynor Amor MD;  Location: [facility];  Service: Urology   • HERNIA REPAIR      multiple repairs to hernia, pt states 6   • VENTRAL/INCISIONAL HERNIA REPAIR N/A 10/22/2019    Procedure: EXPLORATORY LAPAROTOMY, EXTENSIVE LYSIS OF ADHESIONS, EXPLANT OF PREVIOUS  HERNIA MESH, BILATERAL SUBCUTANEOUS SKIN FLAPS, RIGHT SIDED EXTERNAL OBLIQUE RELEASE, VENTRAL  HERNIA REPAIR WITH MESH UNDERLAY AND SMALL BOWEL RESECTION WITH PRIMARY ANASTOMOSIS;  Surgeon: Poncho Nelson MD;  Location: [facility]" OR;  Service: General     Family History   Problem Relation Age of Onset   • Cancer Brother    • Diabetes Son      There is no immunization history for the selected administration types on file for this patient.    No results displayed because visit has over 200 results.            Review of Systems   Constitutional: Negative.    HENT: Negative.    Respiratory: Negative.    Gastrointestinal: Negative.    Genitourinary: Negative.    Musculoskeletal: Negative.    Skin: Negative.    Neurological: Negative.    Psychiatric/Behavioral: Negative.        Objective   Physical Exam   Constitutional: He is oriented to person, place, and time. He appears well-developed and well-nourished.   Cardiovascular: Normal rate and regular rhythm.   Pulmonary/Chest: Effort normal and breath sounds normal.   Abdominal: Soft. Bowel sounds are normal.   Musculoskeletal: Normal range of motion.   Neurological: He is alert and oriented to person, place, and time.   Skin: Skin is warm.   Psychiatric: He has a normal mood and affect.       Procedures    Assessment/Plan   Christiano was seen today for hypertension and hypothyroidism.    Diagnoses and all orders for this visit:    Iron deficiency anemia, unspecified iron deficiency anemia type  -     T3  -     TSH+Free T4    Leukocytosis, unspecified type  -     CBC & Differential    Acquired hypothyroidism  -     CBC & Differential    Hypokalemia  -     Comprehensive Metabolic Panel    Disorder of thyroid, unspecified   -     TSH+Free T4    Benign prostatic hyperplasia with weak urinary stream  -     PSA Screen    Encounter for screening for malignant neoplasm of prostate   -     PSA Screen    Incarcerated ventral hernia    Other orders  -     levothyroxine (SYNTHROID, LEVOTHROID) 112 MCG tablet; Take 1 tablet by mouth Daily.          Current Outpatient Medications:   •  aspirin 81 MG chewable tablet, Chew 81 mg Daily., Disp: , Rfl:   •  levothyroxine (SYNTHROID, LEVOTHROID) 112 MCG tablet, Take 1  tablet by mouth Daily., Disp: 90 tablet, Rfl: 2  •  hydrALAZINE (APRESOLINE) 25 MG tablet, Take 1 tablet by mouth Every 12 (Twelve) Hours. For hypertension, Disp: 60 tablet, Rfl: 0  •  lisinopril (PRINIVIL,ZESTRIL) 10 MG tablet, Take 1 tablet by mouth Daily. For hypertension, Disp: 60 tablet, Rfl: 0  •  saccharomyces boulardii (FLORASTOR) 250 MG capsule, Take 1 capsule by mouth 2 (Two) Times a Day., Disp: 22 capsule, Rfl: 0

## 2020-02-14 LAB
ALBUMIN SERPL-MCNC: 4.1 G/DL (ref 3.6–4.6)
ALBUMIN/GLOB SERPL: 1.3 {RATIO} (ref 1.2–2.2)
ALP SERPL-CCNC: 72 IU/L (ref 39–117)
ALT SERPL-CCNC: 8 IU/L (ref 0–44)
AST SERPL-CCNC: 16 IU/L (ref 0–40)
BASOPHILS # BLD AUTO: 0.1 X10E3/UL (ref 0–0.2)
BASOPHILS NFR BLD AUTO: 1 %
BILIRUB SERPL-MCNC: 0.4 MG/DL (ref 0–1.2)
BUN SERPL-MCNC: 11 MG/DL (ref 8–27)
BUN/CREAT SERPL: 13 (ref 10–24)
CALCIUM SERPL-MCNC: 9.6 MG/DL (ref 8.6–10.2)
CHLORIDE SERPL-SCNC: 101 MMOL/L (ref 96–106)
CO2 SERPL-SCNC: 26 MMOL/L (ref 20–29)
CREAT SERPL-MCNC: 0.82 MG/DL (ref 0.76–1.27)
EOSINOPHIL # BLD AUTO: 0.4 X10E3/UL (ref 0–0.4)
EOSINOPHIL NFR BLD AUTO: 4 %
ERYTHROCYTE [DISTWIDTH] IN BLOOD BY AUTOMATED COUNT: 13 % (ref 11.6–15.4)
GLOBULIN SER CALC-MCNC: 3.1 G/DL (ref 1.5–4.5)
GLUCOSE SERPL-MCNC: 93 MG/DL (ref 65–99)
HCT VFR BLD AUTO: 40.5 % (ref 37.5–51)
HGB BLD-MCNC: 12.9 G/DL (ref 13–17.7)
IMM GRANULOCYTES # BLD AUTO: 0 X10E3/UL (ref 0–0.1)
IMM GRANULOCYTES NFR BLD AUTO: 0 %
LYMPHOCYTES # BLD AUTO: 2.6 X10E3/UL (ref 0.7–3.1)
LYMPHOCYTES NFR BLD AUTO: 28 %
MCH RBC QN AUTO: 29.2 PG (ref 26.6–33)
MCHC RBC AUTO-ENTMCNC: 31.9 G/DL (ref 31.5–35.7)
MCV RBC AUTO: 92 FL (ref 79–97)
MONOCYTES # BLD AUTO: 1 X10E3/UL (ref 0.1–0.9)
MONOCYTES NFR BLD AUTO: 10 %
NEUTROPHILS # BLD AUTO: 5.5 X10E3/UL (ref 1.4–7)
NEUTROPHILS NFR BLD AUTO: 57 %
PLATELET # BLD AUTO: 364 X10E3/UL (ref 150–450)
POTASSIUM SERPL-SCNC: 4.6 MMOL/L (ref 3.5–5.2)
PROT SERPL-MCNC: 7.2 G/DL (ref 6–8.5)
PSA SERPL-MCNC: 0.2 NG/ML (ref 0–4)
RBC # BLD AUTO: 4.42 X10E6/UL (ref 4.14–5.8)
SODIUM SERPL-SCNC: 141 MMOL/L (ref 134–144)
T3 SERPL-MCNC: 120 NG/DL (ref 71–180)
T4 FREE SERPL-MCNC: 1.46 NG/DL (ref 0.82–1.77)
TSH SERPL DL<=0.005 MIU/L-ACNC: 0.28 UIU/ML (ref 0.45–4.5)
WBC # BLD AUTO: 9.5 X10E3/UL (ref 3.4–10.8)

## 2020-02-15 RX ORDER — LEVOTHYROXINE SODIUM 0.1 MG/1
100 TABLET ORAL DAILY
Qty: 30 TABLET | Refills: 2 | Status: SHIPPED | OUTPATIENT
Start: 2020-02-15 | End: 2020-05-26

## 2020-02-17 DIAGNOSIS — D72.829 LEUKOCYTOSIS, UNSPECIFIED TYPE: ICD-10-CM

## 2020-02-17 DIAGNOSIS — D50.9 IRON DEFICIENCY ANEMIA, UNSPECIFIED IRON DEFICIENCY ANEMIA TYPE: ICD-10-CM

## 2020-02-17 DIAGNOSIS — I10 ESSENTIAL HYPERTENSION: ICD-10-CM

## 2020-02-17 DIAGNOSIS — E03.9 ACQUIRED HYPOTHYROIDISM: Primary | Chronic | ICD-10-CM

## 2020-02-17 DIAGNOSIS — Z13.220 ENCOUNTER FOR SCREENING FOR LIPID DISORDER: ICD-10-CM

## 2020-02-18 LAB
CHOLEST SERPL-MCNC: 185 MG/DL (ref 100–199)
HDLC SERPL-MCNC: 44 MG/DL
LDLC SERPL CALC-MCNC: 123 MG/DL (ref 0–99)
TRIGL SERPL-MCNC: 91 MG/DL (ref 0–149)
VLDLC SERPL CALC-MCNC: 18 MG/DL (ref 5–40)

## 2020-05-26 RX ORDER — LEVOTHYROXINE SODIUM 100 UG/1
TABLET ORAL
Qty: 90 TABLET | Refills: 0 | Status: SHIPPED | OUTPATIENT
Start: 2020-05-26 | End: 2020-05-27 | Stop reason: SDUPTHER

## 2020-05-27 RX ORDER — LEVOTHYROXINE SODIUM 0.1 MG/1
100 TABLET ORAL DAILY
Qty: 90 TABLET | Refills: 1 | Status: SHIPPED | OUTPATIENT
Start: 2020-05-27 | End: 2021-02-15

## 2020-08-21 RX ORDER — LEVOTHYROXINE SODIUM 100 UG/1
TABLET ORAL
Qty: 90 TABLET | Refills: 0 | OUTPATIENT
Start: 2020-08-21

## 2021-02-15 ENCOUNTER — TELEPHONE (OUTPATIENT)
Dept: FAMILY MEDICINE CLINIC | Facility: CLINIC | Age: 86
End: 2021-02-15

## 2021-02-15 RX ORDER — LEVOTHYROXINE SODIUM 100 UG/1
TABLET ORAL
Qty: 30 TABLET | Refills: 0 | Status: SHIPPED | OUTPATIENT
Start: 2021-02-15 | End: 2021-02-26 | Stop reason: SDUPTHER

## 2021-02-15 NOTE — TELEPHONE ENCOUNTER
Patients daughter is calling to request a refill on his thyroid medication. She did not know that name of the medication.     [place] [id number] [facility] [place] - [address] [facility] [phone] [facility] [phone] FX

## 2021-02-25 ENCOUNTER — OFFICE VISIT (OUTPATIENT)
Dept: FAMILY MEDICINE CLINIC | Facility: CLINIC | Age: 86
End: 2021-02-25

## 2021-02-25 VITALS
TEMPERATURE: 97.5 F | HEIGHT: 67 IN | WEIGHT: 169.8 LBS | OXYGEN SATURATION: 96 % | DIASTOLIC BLOOD PRESSURE: 76 MMHG | HEART RATE: 70 BPM | SYSTOLIC BLOOD PRESSURE: 138 MMHG | BODY MASS INDEX: 26.65 KG/M2

## 2021-02-25 DIAGNOSIS — Z00.00 PREVENTATIVE HEALTH CARE: ICD-10-CM

## 2021-02-25 DIAGNOSIS — Z12.11 COLON CANCER SCREENING: ICD-10-CM

## 2021-02-25 DIAGNOSIS — R39.12 BENIGN PROSTATIC HYPERPLASIA WITH WEAK URINARY STREAM: Chronic | ICD-10-CM

## 2021-02-25 DIAGNOSIS — N40.1 BENIGN PROSTATIC HYPERPLASIA WITH WEAK URINARY STREAM: Chronic | ICD-10-CM

## 2021-02-25 DIAGNOSIS — Z12.5 SCREENING PSA (PROSTATE SPECIFIC ANTIGEN): ICD-10-CM

## 2021-02-25 DIAGNOSIS — E03.9 ACQUIRED HYPOTHYROIDISM: Primary | Chronic | ICD-10-CM

## 2021-02-25 PROCEDURE — G0439 PPPS, SUBSEQ VISIT: HCPCS | Performed by: NURSE PRACTITIONER

## 2021-02-25 NOTE — PATIENT INSTRUCTIONS
85-year-old white male with history of hypertension, BPH, hypothyroidism, incarcerated hernia who comes in today for  Medicare wellness visit.   Checklist for Medicare wellness involving preventative vaccines and depression safety scanned in the chart  Blood pressure is 138/76 heart rate 70 he denies any chest pain, dyspnea, tachycardia or dizziness  Patient has history of mild anemia will be checking CBC today along with other blood work  Weight is up 11 pounds at 170 with a BMI 26.6  Patient has an upcoming eye exam I am doing a PSA today and home Cologuard    Fasting blood work today  Home Cologuard  Monitor your weight gain  Follow-up 6 months

## 2021-02-25 NOTE — PROGRESS NOTES
"    Christiano Guzmán is a 85 y.o. male.     85-year-old white male with history of hypertension, BPH, hypothyroidism, incarcerated hernia who comes in today for  Medicare wellness visit.   Checklist for Medicare wellness involving preventative vaccines and depression safety scanned in the chart  Blood pressure is 138/76 heart rate 70 he denies any chest pain, dyspnea, tachycardia or dizziness  Patient has history of mild anemia will be checking CBC today along with other blood work  Weight is up 11 pounds at 170 with a BMI 26.6  Patient has an upcoming eye exam I am doing a PSA today and home Cologuard    Fasting blood work today  Home Cologuard  Monitor your weight gain  Follow-up 6 months       The following portions of the patient's history were reviewed and updated as appropriate: allergies, current medications, past family history, past medical history, past social history, past surgical history and problem list.    Vitals:    02/25/21 0816   BP: 138/76   BP Location: Right arm   Patient Position: Sitting   Cuff Size: Adult   Pulse: 70   Temp: 97.5 °F (36.4 °C)   TempSrc: Temporal   SpO2: 96%   Weight: 77 kg (169 lb 12.8 oz)   Height: 170.2 cm (67\")     Body mass index is 26.59 kg/m².    Past Medical History:   Diagnosis Date   • Disease of thyroid gland      Past Surgical History:   Procedure Laterality Date   • APPENDECTOMY     • CYSTOSCOPY N/A 10/22/2019    Procedure: CYSTOSCOPY FLEXIBLE AT BEDSIDE WITH MCGARRY CATHETER PLACEMENT;  Surgeon: Mynor Amor MD;  Location: [facility];  Service: Urology   • HERNIA REPAIR      multiple repairs to hernia, pt states 6   • VENTRAL/INCISIONAL HERNIA REPAIR N/A 10/22/2019    Procedure: EXPLORATORY LAPAROTOMY, EXTENSIVE LYSIS OF ADHESIONS, EXPLANT OF PREVIOUS  HERNIA MESH, BILATERAL SUBCUTANEOUS SKIN FLAPS, RIGHT SIDED EXTERNAL OBLIQUE RELEASE, VENTRAL  HERNIA REPAIR WITH MESH UNDERLAY AND SMALL BOWEL RESECTION WITH PRIMARY ANASTOMOSIS;  Surgeon: Poncho Nelson " MD MILLI;  Location: [facility] MAIN OR;  Service: General     Family History   Problem Relation Age of Onset   • Cancer Brother    • Diabetes Son      Immunization History   Administered Date(s) Administered   • COVID-19 (PFIZER) 02/04/2021       Orders Only on 02/17/2020   Component Date Value Ref Range Status   • Total Cholesterol 02/17/2020 185  100 - 199 mg/dL Final   • Triglycerides 02/17/2020 91  0 - 149 mg/dL Final   • HDL Cholesterol 02/17/2020 44  >39 mg/dL Final   • VLDL Cholesterol 02/17/2020 18  5 - 40 mg/dL Final   • LDL Cholesterol  02/17/2020 123* 0 - 99 mg/dL Final         Review of Systems   Constitutional: Negative.    HENT: Negative.    Respiratory: Negative.    Cardiovascular: Negative.    Genitourinary: Negative.    Musculoskeletal: Negative.    Skin: Negative.    Neurological: Negative.    Psychiatric/Behavioral: Negative.        Objective   Physical Exam  Constitutional:       Appearance: Normal appearance.   HENT:      Head: Normocephalic.   Neck:      Musculoskeletal: Normal range of motion.   Cardiovascular:      Rate and Rhythm: Normal rate and regular rhythm.      Pulses: Normal pulses.   Pulmonary:      Effort: Pulmonary effort is normal.      Breath sounds: Normal breath sounds.   Abdominal:      General: Bowel sounds are normal.   Musculoskeletal: Normal range of motion.   Skin:     General: Skin is warm and dry.   Neurological:      General: No focal deficit present.      Mental Status: He is alert and oriented to person, place, and time.   Psychiatric:         Mood and Affect: Mood normal.         Behavior: Behavior normal.         Procedures    Assessment/Plan   Diagnoses and all orders for this visit:    1. Acquired hypothyroidism (Primary)  -     T3  -     TSH+Free T4    2. Benign prostatic hyperplasia with weak urinary stream    3. Screening PSA (prostate specific antigen)  -     PSA Screen    4. Preventative health care  -     CBC & Differential  -     Comprehensive Metabolic Panel  -      Lipid Panel With LDL / HDL Ratio  -     Hepatitis C antibody; Future    5. Colon cancer screening  -     Cologuard - Stool, Per Rectum; Future    6. BMI 26.0-26.9,adult          Current Outpatient Medications:   •  aspirin 81 MG chewable tablet, Chew 81 mg Daily., Disp: , Rfl:   •  Euthyrox 100 MCG tablet, Take 1 tablet by mouth once daily, Disp: 30 tablet, Rfl: 0

## 2021-02-26 LAB
ALBUMIN SERPL-MCNC: 4 G/DL (ref 3.6–4.6)
ALBUMIN/GLOB SERPL: 1.3 {RATIO} (ref 1.2–2.2)
ALP SERPL-CCNC: 70 IU/L (ref 39–117)
ALT SERPL-CCNC: 8 IU/L (ref 0–44)
AST SERPL-CCNC: 21 IU/L (ref 0–40)
BASOPHILS # BLD AUTO: 0.1 X10E3/UL (ref 0–0.2)
BASOPHILS NFR BLD AUTO: 1 %
BILIRUB SERPL-MCNC: 0.4 MG/DL (ref 0–1.2)
BUN SERPL-MCNC: 13 MG/DL (ref 8–27)
BUN/CREAT SERPL: 12 (ref 10–24)
CALCIUM SERPL-MCNC: 9.5 MG/DL (ref 8.6–10.2)
CHLORIDE SERPL-SCNC: 101 MMOL/L (ref 96–106)
CHOLEST SERPL-MCNC: 165 MG/DL (ref 100–199)
CO2 SERPL-SCNC: 26 MMOL/L (ref 20–29)
CREAT SERPL-MCNC: 1.13 MG/DL (ref 0.76–1.27)
EOSINOPHIL # BLD AUTO: 0.3 X10E3/UL (ref 0–0.4)
EOSINOPHIL NFR BLD AUTO: 3 %
ERYTHROCYTE [DISTWIDTH] IN BLOOD BY AUTOMATED COUNT: 13.3 % (ref 11.6–15.4)
GLOBULIN SER CALC-MCNC: 3 G/DL (ref 1.5–4.5)
GLUCOSE SERPL-MCNC: 100 MG/DL (ref 65–99)
HCT VFR BLD AUTO: 42 % (ref 37.5–51)
HDLC SERPL-MCNC: 41 MG/DL
HGB BLD-MCNC: 14 G/DL (ref 13–17.7)
IMM GRANULOCYTES # BLD AUTO: 0 X10E3/UL (ref 0–0.1)
IMM GRANULOCYTES NFR BLD AUTO: 0 %
LDLC SERPL CALC-MCNC: 111 MG/DL (ref 0–99)
LDLC/HDLC SERPL: 2.7 RATIO (ref 0–3.6)
LYMPHOCYTES # BLD AUTO: 1.9 X10E3/UL (ref 0.7–3.1)
LYMPHOCYTES NFR BLD AUTO: 21 %
MCH RBC QN AUTO: 31 PG (ref 26.6–33)
MCHC RBC AUTO-ENTMCNC: 33.3 G/DL (ref 31.5–35.7)
MCV RBC AUTO: 93 FL (ref 79–97)
MONOCYTES # BLD AUTO: 0.8 X10E3/UL (ref 0.1–0.9)
MONOCYTES NFR BLD AUTO: 9 %
NEUTROPHILS # BLD AUTO: 5.8 X10E3/UL (ref 1.4–7)
NEUTROPHILS NFR BLD AUTO: 66 %
PLATELET # BLD AUTO: 272 X10E3/UL (ref 150–450)
POTASSIUM SERPL-SCNC: 4.8 MMOL/L (ref 3.5–5.2)
PROT SERPL-MCNC: 7 G/DL (ref 6–8.5)
PSA SERPL-MCNC: 0.2 NG/ML (ref 0–4)
RBC # BLD AUTO: 4.52 X10E6/UL (ref 4.14–5.8)
SODIUM SERPL-SCNC: 139 MMOL/L (ref 134–144)
T3 SERPL-MCNC: 111 NG/DL (ref 71–180)
T4 FREE SERPL-MCNC: 1.37 NG/DL (ref 0.82–1.77)
TRIGL SERPL-MCNC: 68 MG/DL (ref 0–149)
TSH SERPL DL<=0.005 MIU/L-ACNC: 2.81 UIU/ML (ref 0.45–4.5)
VLDLC SERPL CALC-MCNC: 13 MG/DL (ref 5–40)
WBC # BLD AUTO: 8.8 X10E3/UL (ref 3.4–10.8)

## 2021-02-26 RX ORDER — LEVOTHYROXINE SODIUM 0.1 MG/1
100 TABLET ORAL DAILY
Qty: 90 TABLET | Refills: 3 | Status: SHIPPED | OUTPATIENT
Start: 2021-02-26 | End: 2021-12-30 | Stop reason: SDUPTHER

## 2021-07-21 ENCOUNTER — OFFICE VISIT (OUTPATIENT)
Dept: FAMILY MEDICINE CLINIC | Facility: CLINIC | Age: 86
End: 2021-07-21

## 2021-07-21 VITALS
OXYGEN SATURATION: 94 % | DIASTOLIC BLOOD PRESSURE: 76 MMHG | HEIGHT: 66 IN | TEMPERATURE: 98 F | SYSTOLIC BLOOD PRESSURE: 138 MMHG | BODY MASS INDEX: 27.16 KG/M2 | WEIGHT: 169 LBS | HEART RATE: 64 BPM

## 2021-07-21 DIAGNOSIS — R73.09 ELEVATED GLUCOSE: ICD-10-CM

## 2021-07-21 DIAGNOSIS — E03.9 ACQUIRED HYPOTHYROIDISM: Primary | ICD-10-CM

## 2021-07-21 DIAGNOSIS — Z13.220 LIPID SCREENING: ICD-10-CM

## 2021-07-21 PROCEDURE — 99213 OFFICE O/P EST LOW 20 MIN: CPT | Performed by: NURSE PRACTITIONER

## 2021-07-21 NOTE — PROGRESS NOTES
"Christiano Guzmán is a 86 y.o. male.     86-year-old white male with history hypertension, BPH, hypothyroidism, incarcerated hernia who comes in today for 6-month follow-up visit and fasting blood work  Blood pressure 138/76 heart rate 94 he denies any chest pain, dyspnea, tachycardia or dizziness  Patient has no new complaints is doing very well  Weight is down a pound at 169 with a BMI 27.3  He is up-to-date on Covid vaccines eye exam PSA but has not done his home Cologuard that was sent to him      Fasting blood work  Complete home Cologuard   follow-up 6 months fasting           The following portions of the patient's history were reviewed and updated as appropriate: allergies, current medications, past family history, past medical history, past social history, past surgical history and problem list.    Vitals:    07/21/21 0836   BP: 138/76   BP Location: Right arm   Patient Position: Sitting   Cuff Size: Large Adult   Pulse: 64   Temp: 98 °F (36.7 °C)   TempSrc: Temporal   SpO2: 94%   Weight: 76.7 kg (169 lb)   Height: 167.6 cm (66\")     Body mass index is 27.28 kg/m².    Past Medical History:   Diagnosis Date   • Disease of thyroid gland    • Hypothyroidism      Past Surgical History:   Procedure Laterality Date   • APPENDECTOMY     • CYSTOSCOPY N/A 10/22/2019    Procedure: CYSTOSCOPY FLEXIBLE AT BEDSIDE WITH MCGARRY CATHETER PLACEMENT;  Surgeon: Mynor Amor MD;  Location: [facility];  Service: Urology   • HERNIA REPAIR      multiple repairs to hernia, pt states 6   • VENTRAL/INCISIONAL HERNIA REPAIR N/A 10/22/2019    Procedure: EXPLORATORY LAPAROTOMY, EXTENSIVE LYSIS OF ADHESIONS, EXPLANT OF PREVIOUS  HERNIA MESH, BILATERAL SUBCUTANEOUS SKIN FLAPS, RIGHT SIDED EXTERNAL OBLIQUE RELEASE, VENTRAL  HERNIA REPAIR WITH MESH UNDERLAY AND SMALL BOWEL RESECTION WITH PRIMARY ANASTOMOSIS;  Surgeon: Poncho Nelson MD;  Location: [facility] OR;  Service: General     Family History   Problem Relation Age of " Onset   • Cancer Brother    • Diabetes Son      Immunization History   Administered Date(s) Administered   • COVID-19 (MODERNA) 02/03/2021, 03/03/2021   • COVID-19 (PFIZER) 02/04/2021       Office Visit on 02/25/2021   Component Date Value Ref Range Status   • WBC 02/25/2021 8.8  3.4 - 10.8 x10E3/uL Final   • RBC 02/25/2021 4.52  4.14 - 5.80 x10E6/uL Final   • Hemoglobin 02/25/2021 14.0  13.0 - 17.7 g/dL Final   • Hematocrit 02/25/2021 42.0  37.5 - 51.0 % Final   • MCV 02/25/2021 93  79 - 97 fL Final   • MCH 02/25/2021 31.0  26.6 - 33.0 pg Final   • MCHC 02/25/2021 33.3  31.5 - 35.7 g/dL Final   • RDW 02/25/2021 13.3  11.6 - 15.4 % Final   • Platelets 02/25/2021 272  150 - 450 x10E3/uL Final   • Neutrophil Rel % 02/25/2021 66  Not Estab. % Final   • Lymphocyte Rel % 02/25/2021 21  Not Estab. % Final   • Monocyte Rel % 02/25/2021 9  Not Estab. % Final   • Eosinophil Rel % 02/25/2021 3  Not Estab. % Final   • Basophil Rel % 02/25/2021 1  Not Estab. % Final   • Neutrophils Absolute 02/25/2021 5.8  1.4 - 7.0 x10E3/uL Final   • Lymphocytes Absolute 02/25/2021 1.9  0.7 - 3.1 x10E3/uL Final   • Monocytes Absolute 02/25/2021 0.8  0.1 - 0.9 x10E3/uL Final   • Eosinophils Absolute 02/25/2021 0.3  0.0 - 0.4 x10E3/uL Final   • Basophils Absolute 02/25/2021 0.1  0.0 - 0.2 x10E3/uL Final   • Immature Granulocyte Rel % 02/25/2021 0  Not Estab. % Final   • Immature Grans Absolute 02/25/2021 0.0  0.0 - 0.1 x10E3/uL Final   • Glucose 02/25/2021 100* 65 - 99 mg/dL Final   • BUN 02/25/2021 13  8 - 27 mg/dL Final   • Creatinine 02/25/2021 1.13  0.76 - 1.27 mg/dL Final   • eGFR Non  Am 02/25/2021 59* >59 mL/min/1.73 Final   • eGFR African Am 02/25/2021 69  >59 mL/min/1.73 Final   • BUN/Creatinine Ratio 02/25/2021 12  10 - 24 Final   • Sodium 02/25/2021 139  134 - 144 mmol/L Final   • Potassium 02/25/2021 4.8  3.5 - 5.2 mmol/L Final   • Chloride 02/25/2021 101  96 - 106 mmol/L Final   • Total CO2 02/25/2021 26  20 - 29 mmol/L Final    • Calcium 02/25/2021 9.5  8.6 - 10.2 mg/dL Final   • Total Protein 02/25/2021 7.0  6.0 - 8.5 g/dL Final   • Albumin 02/25/2021 4.0  3.6 - 4.6 g/dL Final   • Globulin 02/25/2021 3.0  1.5 - 4.5 g/dL Final   • A/G Ratio 02/25/2021 1.3  1.2 - 2.2 Final   • Total Bilirubin 02/25/2021 0.4  0.0 - 1.2 mg/dL Final   • Alkaline Phosphatase 02/25/2021 70  39 - 117 IU/L Final   • AST (SGOT) 02/25/2021 21  0 - 40 IU/L Final   • ALT (SGPT) 02/25/2021 8  0 - 44 IU/L Final   • Total Cholesterol 02/25/2021 165  100 - 199 mg/dL Final   • Triglycerides 02/25/2021 68  0 - 149 mg/dL Final   • HDL Cholesterol 02/25/2021 41  >39 mg/dL Final   • VLDL Cholesterol Avi 02/25/2021 13  5 - 40 mg/dL Final   • LDL Chol Calc (NIH) 02/25/2021 111* 0 - 99 mg/dL Final   • LDL/HDL RATIO 02/25/2021 2.7  0.0 - 3.6 ratio Final    Comment:                                     LDL/HDL Ratio                                              Men  Women                                1/2 Avg.Risk  1.0    1.5                                    Avg.Risk  3.6    3.2                                 2X Avg.Risk  6.2    5.0                                 3X Avg.Risk  8.0    6.1     • T3, Total 02/25/2021 111  71 - 180 ng/dL Final   • TSH 02/25/2021 2.810  0.450 - 4.500 uIU/mL Final   • Free T4 02/25/2021 1.37  0.82 - 1.77 ng/dL Final   • PSA 02/25/2021 0.2  0.0 - 4.0 ng/mL Final    Comment: Roche ECLIA methodology.  According to the American Urological Association, Serum PSA should  decrease and remain at undetectable levels after radical  prostatectomy. The AUA defines biochemical recurrence as an initial  PSA value 0.2 ng/mL or greater followed by a subsequent confirmatory  PSA value 0.2 ng/mL or greater.  Values obtained with different assay methods or kits cannot be used  interchangeably. Results cannot be interpreted as absolute evidence  of the presence or absence of malignant disease.           Review of Systems   Constitutional: Negative.    HENT: Negative.     Cardiovascular: Negative.    Gastrointestinal: Negative.    Genitourinary: Negative.    Skin: Negative.    Neurological: Negative.    Psychiatric/Behavioral: Negative.        Objective   Physical Exam  HENT:      Head: Normocephalic.   Cardiovascular:      Rate and Rhythm: Normal rate and regular rhythm.      Pulses: Normal pulses.      Heart sounds: Normal heart sounds.   Pulmonary:      Effort: Pulmonary effort is normal.      Breath sounds: Normal breath sounds.   Abdominal:      General: Bowel sounds are normal.   Musculoskeletal:         General: Normal range of motion.   Skin:     General: Skin is warm and dry.   Neurological:      General: No focal deficit present.      Mental Status: He is alert and oriented to person, place, and time.   Psychiatric:         Mood and Affect: Mood normal.         Behavior: Behavior normal.         Procedures    Assessment/Plan   Diagnoses and all orders for this visit:    1. Acquired hypothyroidism (Primary)  -     T3; Future  -     TSH+Free T4; Future    2. Lipid screening  -     Lipid Panel With LDL / HDL Ratio; Future    3. Elevated glucose  -     Comprehensive Metabolic Panel; Future    4. BMI 27.0-27.9,adult          Current Outpatient Medications:   •  aspirin 81 MG chewable tablet, Chew 81 mg Daily., Disp: , Rfl:   •  levothyroxine (Euthyrox) 100 MCG tablet, Take 1 tablet by mouth Daily., Disp: 90 tablet, Rfl: 3

## 2021-07-22 ENCOUNTER — TELEPHONE (OUTPATIENT)
Dept: FAMILY MEDICINE CLINIC | Facility: CLINIC | Age: 86
End: 2021-07-22

## 2021-07-22 DIAGNOSIS — Z13.220 LIPID SCREENING: ICD-10-CM

## 2021-07-22 DIAGNOSIS — R73.09 ELEVATED GLUCOSE: ICD-10-CM

## 2021-07-22 DIAGNOSIS — E03.9 ACQUIRED HYPOTHYROIDISM: ICD-10-CM

## 2021-07-22 RX ORDER — FLUTICASONE PROPIONATE 50 MCG
2 SPRAY, SUSPENSION (ML) NASAL DAILY
Qty: 18.2 G | Refills: 6 | Status: SHIPPED | OUTPATIENT
Start: 2021-07-22 | End: 2022-02-17

## 2021-07-22 RX ORDER — AZITHROMYCIN 250 MG/1
TABLET, FILM COATED ORAL
Qty: 6 TABLET | Refills: 0 | Status: SHIPPED | OUTPATIENT
Start: 2021-07-22 | End: 2021-08-26

## 2021-07-22 RX ORDER — LORATADINE 10 MG/1
10 TABLET ORAL DAILY
Qty: 30 TABLET | Refills: 6 | Status: SHIPPED | OUTPATIENT
Start: 2021-07-22 | End: 2022-02-17

## 2021-07-22 NOTE — TELEPHONE ENCOUNTER
Patient wanting to know if a Zpak would be better for what you seen him for yesterday. If so can you please send to pharmacy

## 2021-07-22 NOTE — TELEPHONE ENCOUNTER
ButIt was really more allergies should be on antihistamine and Flonase since he is leaving on vacation I will call him in a Z-Kemal and allergy medicine

## 2021-07-23 LAB
ALBUMIN SERPL-MCNC: 4.2 G/DL (ref 3.6–4.6)
ALBUMIN/GLOB SERPL: 1.6 {RATIO} (ref 1.2–2.2)
ALP SERPL-CCNC: 75 IU/L (ref 48–121)
ALT SERPL-CCNC: 10 IU/L (ref 0–44)
AST SERPL-CCNC: 17 IU/L (ref 0–40)
BILIRUB SERPL-MCNC: 0.4 MG/DL (ref 0–1.2)
BUN SERPL-MCNC: 16 MG/DL (ref 8–27)
BUN/CREAT SERPL: 16 (ref 10–24)
CALCIUM SERPL-MCNC: 9.2 MG/DL (ref 8.6–10.2)
CHLORIDE SERPL-SCNC: 100 MMOL/L (ref 96–106)
CHOLEST SERPL-MCNC: 183 MG/DL (ref 100–199)
CO2 SERPL-SCNC: 24 MMOL/L (ref 20–29)
CREAT SERPL-MCNC: 1.02 MG/DL (ref 0.76–1.27)
GLOBULIN SER CALC-MCNC: 2.7 G/DL (ref 1.5–4.5)
GLUCOSE SERPL-MCNC: 96 MG/DL (ref 65–99)
HDLC SERPL-MCNC: 38 MG/DL
LDLC SERPL CALC-MCNC: 129 MG/DL (ref 0–99)
LDLC/HDLC SERPL: 3.4 RATIO (ref 0–3.6)
POTASSIUM SERPL-SCNC: 4.3 MMOL/L (ref 3.5–5.2)
PROT SERPL-MCNC: 6.9 G/DL (ref 6–8.5)
SODIUM SERPL-SCNC: 138 MMOL/L (ref 134–144)
T3 SERPL-MCNC: 106 NG/DL (ref 71–180)
T4 FREE SERPL-MCNC: 1.35 NG/DL (ref 0.82–1.77)
TRIGL SERPL-MCNC: 87 MG/DL (ref 0–149)
TSH SERPL DL<=0.005 MIU/L-ACNC: 4.18 UIU/ML (ref 0.45–4.5)
VLDLC SERPL CALC-MCNC: 16 MG/DL (ref 5–40)

## 2021-08-26 ENCOUNTER — OFFICE VISIT (OUTPATIENT)
Dept: FAMILY MEDICINE CLINIC | Facility: CLINIC | Age: 86
End: 2021-08-26

## 2021-08-26 VITALS
TEMPERATURE: 97.3 F | HEIGHT: 66 IN | OXYGEN SATURATION: 95 % | DIASTOLIC BLOOD PRESSURE: 77 MMHG | SYSTOLIC BLOOD PRESSURE: 123 MMHG | BODY MASS INDEX: 27.23 KG/M2 | HEART RATE: 73 BPM | WEIGHT: 169.4 LBS

## 2021-08-26 DIAGNOSIS — Z23 NEED FOR 23-POLYVALENT PNEUMOCOCCAL POLYSACCHARIDE VACCINE: ICD-10-CM

## 2021-08-26 DIAGNOSIS — G47.09 OTHER INSOMNIA: Primary | ICD-10-CM

## 2021-08-26 PROCEDURE — 90732 PPSV23 VACC 2 YRS+ SUBQ/IM: CPT | Performed by: NURSE PRACTITIONER

## 2021-08-26 PROCEDURE — 99213 OFFICE O/P EST LOW 20 MIN: CPT | Performed by: NURSE PRACTITIONER

## 2021-08-26 PROCEDURE — G0009 ADMIN PNEUMOCOCCAL VACCINE: HCPCS | Performed by: NURSE PRACTITIONER

## 2021-08-26 RX ORDER — MELATONIN 10 MG
10 CAPSULE ORAL NIGHTLY
Qty: 30 CAPSULE | Refills: 2
Start: 2021-08-26 | End: 2021-12-30

## 2021-08-26 RX ORDER — DIPHENHYDRAMINE HCL 25 MG
25 TABLET ORAL EVERY 6 HOURS PRN
Qty: 30 TABLET | Refills: 2
Start: 2021-08-26 | End: 2022-02-17

## 2021-08-26 NOTE — PATIENT INSTRUCTIONS
Benadryl 25 mg nightly/melatonin 10 mg nightly  Pneumovax 23 today  Cut back on caffeine specially at night  Schedule eye exam  Follow-up 6 months fasting

## 2021-08-26 NOTE — PROGRESS NOTES
"    Christiano Guzmán is a 86 y.o. male.     86-year-old white male with history of hypertension, BPH, hypothyroidism, incarcerated hernia who comes in today for follow-up visit.    Blood pressure 122/76 heart rate 72 he denies any chest pain, dyspnea, tachycardia or dizziness      Patient's main complaint is having trouble sleeping at night wakes up every hour and is not voiding just cannot sleep.  I am going to prescribe him melatonin and Benadryl at night to see if this helps.  He also drinks coffee all day long until bedtime and I recommended half-and-half at night or stopping the nighttime coffee that might be also affecting his sleep.  If this does not work we will order him low-dose of trazodone    Weight is 170 with a BMI of 27.4.  He has had both Covid shots, is up-to-date on PSA and no longer does colonoscopies due to his age.  He does need to schedule an eye exam.  He has never had pneumonia shot we will give him Pneumovax 23 today          Benadryl 25 mg nightly/melatonin 10 mg nightly  Pneumovax 23 today  Cut back on caffeine specially at night  Schedule eye exam  Follow-up 6 months fasting         The following portions of the patient's history were reviewed and updated as appropriate: allergies, current medications, past family history, past medical history, past social history, past surgical history and problem list.    Vitals:    08/26/21 0802   BP: 123/77   BP Location: Right arm   Patient Position: Sitting   Cuff Size: Adult   Pulse: 73   Temp: 97.3 °F (36.3 °C)   TempSrc: Infrared   SpO2: 95%   Weight: 76.8 kg (169 lb 6.4 oz)   Height: 167.6 cm (65.98\")     Body mass index is 27.36 kg/m².    Past Medical History:   Diagnosis Date   • Disease of thyroid gland    • Hypothyroidism      Past Surgical History:   Procedure Laterality Date   • APPENDECTOMY     • CYSTOSCOPY N/A 10/22/2019    Procedure: CYSTOSCOPY FLEXIBLE AT BEDSIDE WITH MCGARRY CATHETER PLACEMENT;  Surgeon: Mynor Amor MD;  " Location: [facility] MAIN OR;  Service: Urology   • HERNIA REPAIR      multiple repairs to hernia, pt states 6   • VENTRAL/INCISIONAL HERNIA REPAIR N/A 10/22/2019    Procedure: EXPLORATORY LAPAROTOMY, EXTENSIVE LYSIS OF ADHESIONS, EXPLANT OF PREVIOUS  HERNIA MESH, BILATERAL SUBCUTANEOUS SKIN FLAPS, RIGHT SIDED EXTERNAL OBLIQUE RELEASE, VENTRAL  HERNIA REPAIR WITH MESH UNDERLAY AND SMALL BOWEL RESECTION WITH PRIMARY ANASTOMOSIS;  Surgeon: Poncho Nelson MD;  Location: [facility] MAIN OR;  Service: General     Family History   Problem Relation Age of Onset   • Cancer Brother    • Diabetes Son      Immunization History   Administered Date(s) Administered   • COVID-19 (MODERNA) 02/03/2021, 03/03/2021   • COVID-19 (PFIZER) 02/04/2021       Orders Only on 07/22/2021   Component Date Value Ref Range Status   • TSH 07/22/2021 4.180  0.450 - 4.500 uIU/mL Final   • Free T4 07/22/2021 1.35  0.82 - 1.77 ng/dL Final   • T3, Total 07/22/2021 106  71 - 180 ng/dL Final   • Total Cholesterol 07/22/2021 183  100 - 199 mg/dL Final   • Triglycerides 07/22/2021 87  0 - 149 mg/dL Final   • HDL Cholesterol 07/22/2021 38* >39 mg/dL Final   • VLDL Cholesterol Avi 07/22/2021 16  5 - 40 mg/dL Final   • LDL Chol Calc ([facility]) 07/22/2021 129* 0 - 99 mg/dL Final   • LDL/HDL RATIO 07/22/2021 3.4  0.0 - 3.6 ratio Final    Comment:                                     LDL/HDL Ratio                                              Men  Women                                1/2 Avg.Risk  1.0    1.5                                    Avg.Risk  3.6    3.2                                 2X Avg.Risk  6.2    5.0                                 3X Avg.Risk  8.0    6.1     • Glucose 07/22/2021 96  65 - 99 mg/dL Final   • BUN 07/22/2021 16  8 - 27 mg/dL Final   • Creatinine 07/22/2021 1.02  0.76 - 1.27 mg/dL Final   • eGFR Non  Am 07/22/2021 67  >59 mL/min/1.73 Final   • eGFR African Am 07/22/2021 77  >59 mL/min/1.73 Final    Comment: **[facility] currently reports  eGFR in compliance with the current**    recommendations of the National Kidney Foundation. [facility] will    update reporting as new guidelines are published from the NKF-ASN    Task force.     • BUN/Creatinine Ratio 07/22/2021 16  10 - 24 Final   • Sodium 07/22/2021 138  134 - 144 mmol/L Final   • Potassium 07/22/2021 4.3  3.5 - 5.2 mmol/L Final   • Chloride 07/22/2021 100  96 - 106 mmol/L Final   • Total CO2 07/22/2021 24  20 - 29 mmol/L Final   • Calcium 07/22/2021 9.2  8.6 - 10.2 mg/dL Final   • Total Protein 07/22/2021 6.9  6.0 - 8.5 g/dL Final   • Albumin 07/22/2021 4.2  3.6 - 4.6 g/dL Final   • Globulin 07/22/2021 2.7  1.5 - 4.5 g/dL Final   • A/G Ratio 07/22/2021 1.6  1.2 - 2.2 Final   • Total Bilirubin 07/22/2021 0.4  0.0 - 1.2 mg/dL Final   • Alkaline Phosphatase 07/22/2021 75  48 - 121 IU/L Final   • AST (SGOT) 07/22/2021 17  0 - 40 IU/L Final   • ALT (SGPT) 07/22/2021 10  0 - 44 IU/L Final         Review of Systems   Constitutional: Negative.    HENT: Negative.    Respiratory: Negative.    Cardiovascular: Negative.    Gastrointestinal: Negative.    Genitourinary: Negative.    Musculoskeletal: Negative.    Neurological: Negative.    Psychiatric/Behavioral: Positive for sleep disturbance.       Objective   Physical Exam  Constitutional:       Appearance: Normal appearance.   HENT:      Head: Normocephalic.   Cardiovascular:      Rate and Rhythm: Normal rate and regular rhythm.      Pulses: Normal pulses.      Heart sounds: Normal heart sounds.   Pulmonary:      Effort: Pulmonary effort is normal.   Abdominal:      General: Bowel sounds are normal.   Musculoskeletal:         General: Normal range of motion.   Skin:     General: Skin is warm and dry.   Neurological:      General: No focal deficit present.      Mental Status: He is alert and oriented to person, place, and time.   Psychiatric:         Mood and Affect: Mood normal.         Behavior: Behavior normal.         Procedures    Assessment/Plan    Diagnoses and all orders for this visit:    1. Other insomnia (Primary)    Other orders  -     Melatonin 10 MG capsule; Take 10 mg by mouth Every Night.  Dispense: 30 capsule; Refill: 2  -     diphenhydrAMINE (Benadryl Allergy) 25 MG tablet; Take 1 tablet by mouth Every 6 (Six) Hours As Needed for Itching.  Dispense: 30 tablet; Refill: 2          Current Outpatient Medications:   •  aspirin 81 MG chewable tablet, Chew 81 mg Daily., Disp: , Rfl:   •  fluticasone (FLONASE) 50 MCG/ACT nasal spray, 2 sprays into the nostril(s) as directed by provider Daily., Disp: 18.2 g, Rfl: 6  •  levothyroxine (Euthyrox) 100 MCG tablet, Take 1 tablet by mouth Daily., Disp: 90 tablet, Rfl: 3  •  loratadine (Claritin) 10 MG tablet, Take 1 tablet by mouth Daily., Disp: 30 tablet, Rfl: 6

## 2021-12-30 ENCOUNTER — OFFICE VISIT (OUTPATIENT)
Dept: FAMILY MEDICINE CLINIC | Facility: CLINIC | Age: 86
End: 2021-12-30

## 2021-12-30 VITALS
SYSTOLIC BLOOD PRESSURE: 122 MMHG | TEMPERATURE: 97.1 F | HEART RATE: 64 BPM | BODY MASS INDEX: 27.1 KG/M2 | WEIGHT: 168.6 LBS | OXYGEN SATURATION: 94 % | HEIGHT: 66 IN | DIASTOLIC BLOOD PRESSURE: 70 MMHG

## 2021-12-30 DIAGNOSIS — E03.9 ACQUIRED HYPOTHYROIDISM: Primary | ICD-10-CM

## 2021-12-30 DIAGNOSIS — E78.00 ELEVATED LDL CHOLESTEROL LEVEL: ICD-10-CM

## 2021-12-30 DIAGNOSIS — Z00.00 PREVENTATIVE HEALTH CARE: ICD-10-CM

## 2021-12-30 PROCEDURE — 99213 OFFICE O/P EST LOW 20 MIN: CPT | Performed by: NURSE PRACTITIONER

## 2021-12-30 RX ORDER — LEVOTHYROXINE SODIUM 0.1 MG/1
100 TABLET ORAL DAILY
Qty: 90 TABLET | Refills: 3 | Status: SHIPPED | OUTPATIENT
Start: 2021-12-30 | End: 2022-03-17

## 2021-12-30 NOTE — PROGRESS NOTES
"    Christiano Guzmán is a 86 y.o. male.     86-year-old white male with history of hypertension, BPH, hypothyroidism, incarcerated hernia and insomnia who comes in today for follow-up visit    Blood pressure 122/70 heart rate 64 he denies any chest pain, dyspnea, tachycardia or dizziness    On last visit patient was having trouble with insomnia placed him on melatonin and Benadryl and he states insomnia has for the most part resolved and only uses the Benadryl now as needed    Patient has lost 10 pounds with a weight of 168 BMI 27 and he was not aware he had  any weight loss.  I told him to monitor his weight closely and make sure he is eating the same amount he always did    Patient up-to-date on 2 Covid vaccines but does not want the booster.  He did not get a flu shot this year but he did get a eye exam and his PSA is due in March.  He no longer does colonoscopy due to his age          Fasting blood work  Monitor weight closely and make sure you are eating the same amount of normal  Reconsider Covid booster  Follow-up 6 months       The following portions of the patient's history were reviewed and updated as appropriate: allergies, current medications, past family history, past medical history, past social history, past surgical history and problem list.    Vitals:    12/30/21 0830   BP: 122/70   BP Location: Left arm   Patient Position: Sitting   Cuff Size: Adult   Pulse: 64   Temp: 97.1 °F (36.2 °C)   TempSrc: Oral   SpO2: 94%   Weight: 76.5 kg (168 lb 9.6 oz)   Height: 167.6 cm (65.98\")     Body mass index is 27.23 kg/m².    Past Medical History:   Diagnosis Date   • Disease of thyroid gland    • Hypothyroidism      Past Surgical History:   Procedure Laterality Date   • APPENDECTOMY     • CYSTOSCOPY N/A 10/22/2019    Procedure: CYSTOSCOPY FLEXIBLE AT BEDSIDE WITH MCGARRY CATHETER PLACEMENT;  Surgeon: Mynor Amor MD;  Location: [facility] MAIN OR;  Service: Urology   • HERNIA REPAIR      multiple repairs to " hernia, pt states 6   • VENTRAL/INCISIONAL HERNIA REPAIR N/A 10/22/2019    Procedure: EXPLORATORY LAPAROTOMY, EXTENSIVE LYSIS OF ADHESIONS, EXPLANT OF PREVIOUS  HERNIA MESH, BILATERAL SUBCUTANEOUS SKIN FLAPS, RIGHT SIDED EXTERNAL OBLIQUE RELEASE, VENTRAL  HERNIA REPAIR WITH MESH UNDERLAY AND SMALL BOWEL RESECTION WITH PRIMARY ANASTOMOSIS;  Surgeon: Poncho Nelson MD;  Location: [facility] MAIN OR;  Service: General     Family History   Problem Relation Age of Onset   • Cancer Brother    • Diabetes Son      Immunization History   Administered Date(s) Administered   • COVID-19 (MODERNA) 1st, 2nd, 3rd Dose Only 02/03/2021, 03/03/2021   • COVID-19 (PFIZER) 02/04/2021   • Pneumococcal Polysaccharide (PPSV23) 08/26/2021       Orders Only on 07/22/2021   Component Date Value Ref Range Status   • TSH 07/22/2021 4.180  0.450 - 4.500 uIU/mL Final   • Free T4 07/22/2021 1.35  0.82 - 1.77 ng/dL Final   • T3, Total 07/22/2021 106  71 - 180 ng/dL Final   • Total Cholesterol 07/22/2021 183  100 - 199 mg/dL Final   • Triglycerides 07/22/2021 87  0 - 149 mg/dL Final   • HDL Cholesterol 07/22/2021 38* >39 mg/dL Final   • VLDL Cholesterol Avi 07/22/2021 16  5 - 40 mg/dL Final   • LDL Chol Calc ([facility]) 07/22/2021 129* 0 - 99 mg/dL Final   • LDL/HDL RATIO 07/22/2021 3.4  0.0 - 3.6 ratio Final    Comment:                                     LDL/HDL Ratio                                              Men  Women                                1/2 Avg.Risk  1.0    1.5                                    Avg.Risk  3.6    3.2                                 2X Avg.Risk  6.2    5.0                                 3X Avg.Risk  8.0    6.1     • Glucose 07/22/2021 96  65 - 99 mg/dL Final   • BUN 07/22/2021 16  8 - 27 mg/dL Final   • Creatinine 07/22/2021 1.02  0.76 - 1.27 mg/dL Final   • eGFR Non  Am 07/22/2021 67  >59 mL/min/1.73 Final   • eGFR African Am 07/22/2021 77  >59 mL/min/1.73 Final    Comment: **[facility] currently reports eGFR in  compliance with the current**    recommendations of the National Kidney Foundation. [facility] will    update reporting as new guidelines are published from the NKF-ASN    Task force.     • BUN/Creatinine Ratio 07/22/2021 16  10 - 24 Final   • Sodium 07/22/2021 138  134 - 144 mmol/L Final   • Potassium 07/22/2021 4.3  3.5 - 5.2 mmol/L Final   • Chloride 07/22/2021 100  96 - 106 mmol/L Final   • Total CO2 07/22/2021 24  20 - 29 mmol/L Final   • Calcium 07/22/2021 9.2  8.6 - 10.2 mg/dL Final   • Total Protein 07/22/2021 6.9  6.0 - 8.5 g/dL Final   • Albumin 07/22/2021 4.2  3.6 - 4.6 g/dL Final   • Globulin 07/22/2021 2.7  1.5 - 4.5 g/dL Final   • A/G Ratio 07/22/2021 1.6  1.2 - 2.2 Final   • Total Bilirubin 07/22/2021 0.4  0.0 - 1.2 mg/dL Final   • Alkaline Phosphatase 07/22/2021 75  48 - 121 IU/L Final   • AST (SGOT) 07/22/2021 17  0 - 40 IU/L Final   • ALT (SGPT) 07/22/2021 10  0 - 44 IU/L Final         Review of Systems   Constitutional: Negative.    HENT: Negative.    Respiratory: Negative.    Cardiovascular: Negative.    Gastrointestinal: Negative.    Genitourinary: Negative.    Musculoskeletal: Negative.    Skin: Negative.    Neurological: Negative.    Psychiatric/Behavioral: Negative.        Objective   Physical Exam  Constitutional:       Appearance: Normal appearance.   HENT:      Head: Normocephalic.   Cardiovascular:      Rate and Rhythm: Normal rate and regular rhythm.      Pulses: Normal pulses.      Heart sounds: Normal heart sounds.   Pulmonary:      Effort: Pulmonary effort is normal.      Breath sounds: Normal breath sounds.   Abdominal:      General: Bowel sounds are normal.   Musculoskeletal:         General: Normal range of motion.   Skin:     General: Skin is warm and dry.   Neurological:      General: No focal deficit present.      Mental Status: He is alert and oriented to person, place, and time.         Procedures    Assessment/Plan   Diagnoses and all orders for this visit:    1. Acquired  hypothyroidism (Primary)  -     TSH+Free T4  -     T3    2. Elevated LDL cholesterol level  -     Lipid Panel With LDL / HDL Ratio    3. Preventative health care  -     CBC & Differential  -     Comprehensive Metabolic Panel    Other orders  -     levothyroxine (Euthyrox) 100 MCG tablet; Take 1 tablet by mouth Daily.  Dispense: 90 tablet; Refill: 3          Current Outpatient Medications:   •  aspirin 81 MG chewable tablet, Chew 81 mg Daily., Disp: , Rfl:   •  diphenhydrAMINE (Benadryl Allergy) 25 MG tablet, Take 1 tablet by mouth Every 6 (Six) Hours As Needed for Itching., Disp: 30 tablet, Rfl: 2  •  fluticasone (FLONASE) 50 MCG/ACT nasal spray, 2 sprays into the nostril(s) as directed by provider Daily., Disp: 18.2 g, Rfl: 6  •  levothyroxine (Euthyrox) 100 MCG tablet, Take 1 tablet by mouth Daily., Disp: 90 tablet, Rfl: 3  •  loratadine (Claritin) 10 MG tablet, Take 1 tablet by mouth Daily., Disp: 30 tablet, Rfl: 6           Radha Greene, [id number] EST  This note has been electronically signed

## 2021-12-30 NOTE — PATIENT INSTRUCTIONS
Fasting blood work  Monitor weight closely and make sure you are eating the same amount of normal  Reconsider Covid booster  Follow-up 6 months

## 2021-12-31 LAB
ALBUMIN SERPL-MCNC: 4 G/DL (ref 3.6–4.6)
ALBUMIN/GLOB SERPL: 1.5 {RATIO} (ref 1.2–2.2)
ALP SERPL-CCNC: 70 IU/L (ref 44–121)
ALT SERPL-CCNC: 12 IU/L (ref 0–44)
AST SERPL-CCNC: 17 IU/L (ref 0–40)
BASOPHILS # BLD AUTO: 0.1 X10E3/UL (ref 0–0.2)
BASOPHILS NFR BLD AUTO: 1 %
BILIRUB SERPL-MCNC: 0.5 MG/DL (ref 0–1.2)
BUN SERPL-MCNC: 19 MG/DL (ref 8–27)
BUN/CREAT SERPL: 18 (ref 10–24)
CALCIUM SERPL-MCNC: 9.3 MG/DL (ref 8.6–10.2)
CHLORIDE SERPL-SCNC: 102 MMOL/L (ref 96–106)
CHOLEST SERPL-MCNC: 176 MG/DL (ref 100–199)
CO2 SERPL-SCNC: 24 MMOL/L (ref 20–29)
CREAT SERPL-MCNC: 1.06 MG/DL (ref 0.76–1.27)
EOSINOPHIL # BLD AUTO: 0.3 X10E3/UL (ref 0–0.4)
EOSINOPHIL NFR BLD AUTO: 3 %
ERYTHROCYTE [DISTWIDTH] IN BLOOD BY AUTOMATED COUNT: 13 % (ref 11.6–15.4)
GLOBULIN SER CALC-MCNC: 2.7 G/DL (ref 1.5–4.5)
GLUCOSE SERPL-MCNC: 95 MG/DL (ref 65–99)
HCT VFR BLD AUTO: 42.7 % (ref 37.5–51)
HDLC SERPL-MCNC: 41 MG/DL
HGB BLD-MCNC: 13.7 G/DL (ref 13–17.7)
IMM GRANULOCYTES # BLD AUTO: 0 X10E3/UL (ref 0–0.1)
IMM GRANULOCYTES NFR BLD AUTO: 0 %
LDLC SERPL CALC-MCNC: 119 MG/DL (ref 0–99)
LDLC/HDLC SERPL: 2.9 RATIO (ref 0–3.6)
LYMPHOCYTES # BLD AUTO: 2.2 X10E3/UL (ref 0.7–3.1)
LYMPHOCYTES NFR BLD AUTO: 22 %
MCH RBC QN AUTO: 29.9 PG (ref 26.6–33)
MCHC RBC AUTO-ENTMCNC: 32.1 G/DL (ref 31.5–35.7)
MCV RBC AUTO: 93 FL (ref 79–97)
MONOCYTES # BLD AUTO: 0.8 X10E3/UL (ref 0.1–0.9)
MONOCYTES NFR BLD AUTO: 8 %
NEUTROPHILS # BLD AUTO: 6.7 X10E3/UL (ref 1.4–7)
NEUTROPHILS NFR BLD AUTO: 66 %
PLATELET # BLD AUTO: 298 X10E3/UL (ref 150–450)
POTASSIUM SERPL-SCNC: 4.8 MMOL/L (ref 3.5–5.2)
PROT SERPL-MCNC: 6.7 G/DL (ref 6–8.5)
RBC # BLD AUTO: 4.58 X10E6/UL (ref 4.14–5.8)
SODIUM SERPL-SCNC: 138 MMOL/L (ref 134–144)
T3 SERPL-MCNC: 98 NG/DL (ref 71–180)
T4 FREE SERPL-MCNC: 1.21 NG/DL (ref 0.82–1.77)
TRIGL SERPL-MCNC: 84 MG/DL (ref 0–149)
TSH SERPL DL<=0.005 MIU/L-ACNC: 4.22 UIU/ML (ref 0.45–4.5)
VLDLC SERPL CALC-MCNC: 16 MG/DL (ref 5–40)
WBC # BLD AUTO: 10.2 X10E3/UL (ref 3.4–10.8)

## 2022-02-17 ENCOUNTER — OFFICE VISIT (OUTPATIENT)
Dept: FAMILY MEDICINE CLINIC | Facility: CLINIC | Age: 87
End: 2022-02-17

## 2022-02-17 VITALS
DIASTOLIC BLOOD PRESSURE: 75 MMHG | OXYGEN SATURATION: 96 % | BODY MASS INDEX: 25.97 KG/M2 | WEIGHT: 161.6 LBS | SYSTOLIC BLOOD PRESSURE: 133 MMHG | HEIGHT: 66 IN | HEART RATE: 73 BPM | TEMPERATURE: 97.3 F

## 2022-02-17 DIAGNOSIS — Z00.00 PREVENTATIVE HEALTH CARE: ICD-10-CM

## 2022-02-17 DIAGNOSIS — E03.9 ACQUIRED HYPOTHYROIDISM: Primary | ICD-10-CM

## 2022-02-17 DIAGNOSIS — J30.9 ALLERGIC RHINITIS, UNSPECIFIED SEASONALITY, UNSPECIFIED TRIGGER: ICD-10-CM

## 2022-02-17 DIAGNOSIS — E78.2 MIXED HYPERLIPIDEMIA: ICD-10-CM

## 2022-02-17 DIAGNOSIS — R05.9 COUGH: ICD-10-CM

## 2022-02-17 PROBLEM — Z96.1 PSEUDOPHAKIA: Status: ACTIVE | Noted: 2021-04-20

## 2022-02-17 PROBLEM — D23.10: Status: ACTIVE | Noted: 2017-08-31

## 2022-02-17 PROBLEM — H04.209 EPIPHORA: Status: ACTIVE | Noted: 2017-08-31

## 2022-02-17 PROBLEM — H52.4 PRESBYOPIA: Status: ACTIVE | Noted: 2021-04-20

## 2022-02-17 PROCEDURE — 99213 OFFICE O/P EST LOW 20 MIN: CPT | Performed by: NURSE PRACTITIONER

## 2022-02-17 RX ORDER — LORATADINE 10 MG/1
10 TABLET ORAL DAILY
Qty: 30 TABLET | Refills: 6 | Status: SHIPPED | OUTPATIENT
Start: 2022-02-17 | End: 2022-06-30 | Stop reason: SDUPTHER

## 2022-02-17 RX ORDER — DOXYCYCLINE 100 MG/1
100 CAPSULE ORAL 2 TIMES DAILY
Qty: 20 CAPSULE | Refills: 0 | Status: SHIPPED | OUTPATIENT
Start: 2022-02-17 | End: 2022-06-30

## 2022-02-17 RX ORDER — FLUTICASONE PROPIONATE 50 MCG
2 SPRAY, SUSPENSION (ML) NASAL DAILY
Qty: 18.2 G | Refills: 6 | Status: SHIPPED | OUTPATIENT
Start: 2022-02-17 | End: 2022-06-30

## 2022-02-17 NOTE — PATIENT INSTRUCTIONS
Doxycycline 100 mg twice daily x10 days  Flonase/ Zyrtec  Fasting blood work today  Get your Covid booster  Call office if condition worsen

## 2022-02-17 NOTE — PROGRESS NOTES
"Christiano Guzmán is a 86 y.o. male.     86-year-old white male with history of hypertension, BPH, hypothyroidism, incarcerated hernia and insomnia who comes in today with complaints of lingering cough after having COVID in January.  Lungs are very diminished with rales.  Patient states he coughs mostly during today and has had some white drainage coming up.  He states all the other symptoms from Covid have disappeared.  I placed him on antibiotic and back on his allergy medications since he has a lot of allergic rhinitis symptoms as well    Blood pressure 132/75 heart rate 72 O2 sat 96% denies any chest pain, dyspnea, tachycardia or dizziness    Patient has had 2 COVID vaccine but still needs his booster            Doxycycline 100 mg twice daily x10 days  Flonase/ Zyrtec  Fasting blood work today  Get your Covid booster  Call office if condition worsen         The following portions of the patient's history were reviewed and updated as appropriate: allergies, current medications, past family history, past medical history, past social history, past surgical history and problem list.    Vitals:    02/17/22 0816   BP: 133/75   BP Location: Right arm   Patient Position: Sitting   Cuff Size: Adult   Pulse: 73   Temp: 97.3 °F (36.3 °C)   TempSrc: Temporal   SpO2: 96%   Weight: 73.3 kg (161 lb 9.6 oz)   Height: 166.4 cm (65.5\")     Body mass index is 26.48 kg/m².    Past Medical History:   Diagnosis Date   • Disease of thyroid gland    • Hypothyroidism      Past Surgical History:   Procedure Laterality Date   • APPENDECTOMY     • CYSTOSCOPY N/A 10/22/2019    Procedure: CYSTOSCOPY FLEXIBLE AT BEDSIDE WITH MCGARRY CATHETER PLACEMENT;  Surgeon: Mynor Amor MD;  Location: [facility] OR;  Service: Urology   • HERNIA REPAIR      multiple repairs to hernia, pt states 6   • VENTRAL/INCISIONAL HERNIA REPAIR N/A 10/22/2019    Procedure: EXPLORATORY LAPAROTOMY, EXTENSIVE LYSIS OF ADHESIONS, EXPLANT OF PREVIOUS  HERNIA " MESH, BILATERAL SUBCUTANEOUS SKIN FLAPS, RIGHT SIDED EXTERNAL OBLIQUE RELEASE, VENTRAL  HERNIA REPAIR WITH MESH UNDERLAY AND SMALL BOWEL RESECTION WITH PRIMARY ANASTOMOSIS;  Surgeon: Poncho Nelson MD;  Location: [facility] MAIN OR;  Service: General     Family History   Problem Relation Age of Onset   • Cancer Brother    • Diabetes Son      Immunization History   Administered Date(s) Administered   • COVID-19 (MODERNA) 1st, 2nd, 3rd Dose Only 02/03/2021, 03/03/2021   • COVID-19 (PFIZER) PURPLE CAP 02/04/2021   • Pneumococcal Polysaccharide (PPSV23) 08/26/2021       Office Visit on 12/30/2021   Component Date Value Ref Range Status   • WBC 12/30/2021 10.2  3.4 - 10.8 x10E3/uL Final   • RBC 12/30/2021 4.58  4.14 - 5.80 x10E6/uL Final   • Hemoglobin 12/30/2021 13.7  13.0 - 17.7 g/dL Final   • Hematocrit 12/30/2021 42.7  37.5 - 51.0 % Final   • MCV 12/30/2021 93  79 - 97 fL Final   • MCH 12/30/2021 29.9  26.6 - 33.0 pg Final   • MCHC 12/30/2021 32.1  31.5 - 35.7 g/dL Final   • RDW 12/30/2021 13.0  11.6 - 15.4 % Final   • Platelets 12/30/2021 298  150 - 450 x10E3/uL Final   • Neutrophil Rel % 12/30/2021 66  Not Estab. % Final   • Lymphocyte Rel % 12/30/2021 22  Not Estab. % Final   • Monocyte Rel % 12/30/2021 8  Not Estab. % Final   • Eosinophil Rel % 12/30/2021 3  Not Estab. % Final   • Basophil Rel % 12/30/2021 1  Not Estab. % Final   • Neutrophils Absolute 12/30/2021 6.7  1.4 - 7.0 x10E3/uL Final   • Lymphocytes Absolute 12/30/2021 2.2  0.7 - 3.1 x10E3/uL Final   • Monocytes Absolute 12/30/2021 0.8  0.1 - 0.9 x10E3/uL Final   • Eosinophils Absolute 12/30/2021 0.3  0.0 - 0.4 x10E3/uL Final   • Basophils Absolute 12/30/2021 0.1  0.0 - 0.2 x10E3/uL Final   • Immature Granulocyte Rel % 12/30/2021 0  Not Estab. % Final   • Immature Grans Absolute 12/30/2021 0.0  0.0 - 0.1 x10E3/uL Final   • Glucose 12/30/2021 95  65 - 99 mg/dL Final   • BUN 12/30/2021 19  8 - 27 mg/dL Final   • Creatinine 12/30/2021 1.06  0.76 - 1.27 mg/dL  Final   • eGFR Non  Am 12/30/2021 64  >59 mL/min/1.73 Final   • eGFR African Am 12/30/2021 74  >59 mL/min/1.73 Final    Comment: **In accordance with recommendations from the NKF-ASN Task force,**    [facility] is in the process of updating its eGFR calculation to the    2021 CKD-EPI creatinine equation that estimates kidney function    without a race variable.     • BUN/Creatinine Ratio 12/30/2021 18  10 - 24 Final   • Sodium 12/30/2021 138  134 - 144 mmol/L Final   • Potassium 12/30/2021 4.8  3.5 - 5.2 mmol/L Final   • Chloride 12/30/2021 102  96 - 106 mmol/L Final   • Total CO2 12/30/2021 24  20 - 29 mmol/L Final   • Calcium 12/30/2021 9.3  8.6 - 10.2 mg/dL Final   • Total Protein 12/30/2021 6.7  6.0 - 8.5 g/dL Final   • Albumin 12/30/2021 4.0  3.6 - 4.6 g/dL Final   • Globulin 12/30/2021 2.7  1.5 - 4.5 g/dL Final   • A/G Ratio 12/30/2021 1.5  1.2 - 2.2 Final   • Total Bilirubin 12/30/2021 0.5  0.0 - 1.2 mg/dL Final   • Alkaline Phosphatase 12/30/2021 70  44 - 121 IU/L Final                  **Please note reference interval change**   • AST (SGOT) 12/30/2021 17  0 - 40 IU/L Final   • ALT (SGPT) 12/30/2021 12  0 - 44 IU/L Final   • Total Cholesterol 12/30/2021 176  100 - 199 mg/dL Final   • Triglycerides 12/30/2021 84  0 - 149 mg/dL Final   • HDL Cholesterol 12/30/2021 41  >39 mg/dL Final   • VLDL Cholesterol Avi 12/30/2021 16  5 - 40 mg/dL Final   • LDL Chol Calc (NIH) 12/30/2021 119* 0 - 99 mg/dL Final   • LDL/HDL RATIO 12/30/2021 2.9  0.0 - 3.6 ratio Final    Comment:                                     LDL/HDL Ratio                                              Men  Women                                1/2 Avg.Risk  1.0    1.5                                    Avg.Risk  3.6    3.2                                 2X Avg.Risk  6.2    5.0                                 3X Avg.Risk  8.0    6.1     • TSH 12/30/2021 4.220  0.450 - 4.500 uIU/mL Final   • Free T4 12/30/2021 1.21  0.82 - 1.77 ng/dL Final   • T3,  Total 12/30/2021 98  71 - 180 ng/dL Final         Review of Systems   Constitutional: Negative.    HENT: Positive for postnasal drip.    Respiratory: Positive for cough.    Cardiovascular: Negative.    Gastrointestinal: Negative.    Genitourinary: Negative.    Musculoskeletal: Negative.    Skin: Negative.    Neurological: Negative.    Psychiatric/Behavioral: Negative.        Objective   Physical Exam  Constitutional:       Appearance: Normal appearance.   HENT:      Head: Normocephalic.      Mouth/Throat:      Comments: Heavy postnasal drip  Cardiovascular:      Rate and Rhythm: Normal rate and regular rhythm.      Pulses: Normal pulses.      Heart sounds: Normal heart sounds.   Pulmonary:      Effort: Pulmonary effort is normal.      Comments: Lungs very diminished with fine rales  Abdominal:      General: Bowel sounds are normal.   Musculoskeletal:         General: Normal range of motion.   Skin:     General: Skin is warm.   Neurological:      General: No focal deficit present.      Mental Status: He is alert and oriented to person, place, and time.   Psychiatric:         Mood and Affect: Mood normal.         Behavior: Behavior normal.         Procedures    Assessment/Plan   Diagnoses and all orders for this visit:    1. Acquired hypothyroidism (Primary)  -     TSH+Free T4  -     T3    2. Mixed hyperlipidemia  -     Lipid Panel With LDL / HDL Ratio    3. Preventative health care  -     Comprehensive Metabolic Panel    4. BMI 27.0-27.9,adult    5. Allergic rhinitis, unspecified seasonality, unspecified trigger    6. Cough    Other orders  -     doxycycline (MONODOX) 100 MG capsule; Take 1 capsule by mouth 2 (Two) Times a Day.  Dispense: 20 capsule; Refill: 0  -     fluticasone (FLONASE) 50 MCG/ACT nasal spray; 2 sprays into the nostril(s) as directed by provider Daily.  Dispense: 18.2 g; Refill: 6  -     loratadine (Claritin) 10 MG tablet; Take 1 tablet by mouth Daily.  Dispense: 30 tablet; Refill:  6          Current Outpatient Medications:   •  aspirin 81 MG chewable tablet, Chew 81 mg Daily., Disp: , Rfl:   •  levothyroxine (Euthyrox) 100 MCG tablet, Take 1 tablet by mouth Daily., Disp: 90 tablet, Rfl: 3  •  doxycycline (MONODOX) 100 MG capsule, Take 1 capsule by mouth 2 (Two) Times a Day., Disp: 20 capsule, Rfl: 0  •  fluticasone (FLONASE) 50 MCG/ACT nasal spray, 2 sprays into the nostril(s) as directed by provider Daily., Disp: 18.2 g, Rfl: 6  •  loratadine (Claritin) 10 MG tablet, Take 1 tablet by mouth Daily., Disp: 30 tablet, Rfl: 6           Radha Greene, APRN2/17/202211:17 EST  This note has been electronically signed

## 2022-02-18 LAB
ALBUMIN SERPL-MCNC: 3.9 G/DL (ref 3.6–4.6)
ALBUMIN/GLOB SERPL: 1.3 {RATIO} (ref 1.2–2.2)
ALP SERPL-CCNC: 71 IU/L (ref 44–121)
ALT SERPL-CCNC: 6 IU/L (ref 0–44)
AST SERPL-CCNC: 18 IU/L (ref 0–40)
BILIRUB SERPL-MCNC: 0.3 MG/DL (ref 0–1.2)
BUN SERPL-MCNC: 15 MG/DL (ref 8–27)
BUN/CREAT SERPL: 17 (ref 10–24)
CALCIUM SERPL-MCNC: 9.2 MG/DL (ref 8.6–10.2)
CHLORIDE SERPL-SCNC: 102 MMOL/L (ref 96–106)
CHOLEST SERPL-MCNC: 166 MG/DL (ref 100–199)
CO2 SERPL-SCNC: 21 MMOL/L (ref 20–29)
CREAT SERPL-MCNC: 0.89 MG/DL (ref 0.76–1.27)
GLOBULIN SER CALC-MCNC: 3 G/DL (ref 1.5–4.5)
GLUCOSE SERPL-MCNC: 92 MG/DL (ref 65–99)
HDLC SERPL-MCNC: 39 MG/DL
LDLC SERPL CALC-MCNC: 112 MG/DL (ref 0–99)
LDLC/HDLC SERPL: 2.9 RATIO (ref 0–3.6)
POTASSIUM SERPL-SCNC: 4.8 MMOL/L (ref 3.5–5.2)
PROT SERPL-MCNC: 6.9 G/DL (ref 6–8.5)
SODIUM SERPL-SCNC: 139 MMOL/L (ref 134–144)
T3 SERPL-MCNC: 101 NG/DL (ref 71–180)
T4 FREE SERPL-MCNC: 1.19 NG/DL (ref 0.82–1.77)
TRIGL SERPL-MCNC: 79 MG/DL (ref 0–149)
TSH SERPL DL<=0.005 MIU/L-ACNC: 1.37 UIU/ML (ref 0.45–4.5)
VLDLC SERPL CALC-MCNC: 15 MG/DL (ref 5–40)

## 2022-03-17 RX ORDER — LEVOTHYROXINE SODIUM 100 UG/1
TABLET ORAL
Qty: 90 TABLET | Refills: 0 | Status: SHIPPED | OUTPATIENT
Start: 2022-03-17 | End: 2022-06-30 | Stop reason: SDUPTHER

## 2022-06-30 ENCOUNTER — OFFICE VISIT (OUTPATIENT)
Dept: FAMILY MEDICINE CLINIC | Facility: CLINIC | Age: 87
End: 2022-06-30

## 2022-06-30 VITALS
SYSTOLIC BLOOD PRESSURE: 135 MMHG | OXYGEN SATURATION: 94 % | HEIGHT: 66 IN | HEART RATE: 62 BPM | TEMPERATURE: 97.1 F | BODY MASS INDEX: 26.97 KG/M2 | DIASTOLIC BLOOD PRESSURE: 78 MMHG | WEIGHT: 167.8 LBS

## 2022-06-30 DIAGNOSIS — Z00.00 PREVENTATIVE HEALTH CARE: ICD-10-CM

## 2022-06-30 DIAGNOSIS — E03.9 ACQUIRED HYPOTHYROIDISM: Primary | ICD-10-CM

## 2022-06-30 PROCEDURE — G0439 PPPS, SUBSEQ VISIT: HCPCS | Performed by: NURSE PRACTITIONER

## 2022-06-30 PROCEDURE — 1159F MED LIST DOCD IN RCRD: CPT | Performed by: NURSE PRACTITIONER

## 2022-06-30 PROCEDURE — 1170F FXNL STATUS ASSESSED: CPT | Performed by: NURSE PRACTITIONER

## 2022-06-30 RX ORDER — LEVOTHYROXINE SODIUM 0.1 MG/1
100 TABLET ORAL DAILY
Qty: 90 TABLET | Refills: 1 | Status: SHIPPED | OUTPATIENT
Start: 2022-06-30 | End: 2022-07-03

## 2022-06-30 RX ORDER — LORATADINE 10 MG/1
10 TABLET ORAL DAILY
Qty: 30 TABLET | Refills: 6 | Status: SHIPPED | OUTPATIENT
Start: 2022-06-30 | End: 2023-03-04

## 2022-06-30 NOTE — PROGRESS NOTES
"    Christiano Guzmán is a 87 y.o. male.     87-year-old white male with hypertension, BPH, hypothyroidism, incarcerated hernia and insomnia who comes in today for Medicare wellness visit    Blood pressure 134/78 heart rate 62 he denies any chest pain, dyspnea, tachycardia or dizziness.    Weight is 168 with a BMI of 27.5.  Other than  mildly elevated LDL patient's blood work is pretty stable.  We will be doing labs today    Patient up-to-date on COVID vaccines had a Cologuard 2021 up-to-date on eye exam and PSA            Blood work today  Follow-up 6 months       The following portions of the patient's history were reviewed and updated as appropriate: allergies, current medications, past family history, past medical history, past social history, past surgical history and problem list.    Vitals:    06/30/22 0803   BP: 135/78   BP Location: Right arm   Patient Position: Sitting   Cuff Size: Adult   Pulse: 62   Temp: 97.1 °F (36.2 °C)   TempSrc: Temporal   SpO2: 94%   Weight: 76.1 kg (167 lb 12.8 oz)   Height: 166.4 cm (65.5\")     Body mass index is 27.5 kg/m².    Past Medical History:   Diagnosis Date   • Disease of thyroid gland    • Hypothyroidism      Past Surgical History:   Procedure Laterality Date   • APPENDECTOMY     • CYSTOSCOPY N/A 10/22/2019    Procedure: CYSTOSCOPY FLEXIBLE AT BEDSIDE WITH MCGARRY CATHETER PLACEMENT;  Surgeon: Mynor Amor MD;  Location: [facility];  Service: Urology   • HERNIA REPAIR      multiple repairs to hernia, pt states 6   • VENTRAL/INCISIONAL HERNIA REPAIR N/A 10/22/2019    Procedure: EXPLORATORY LAPAROTOMY, EXTENSIVE LYSIS OF ADHESIONS, EXPLANT OF PREVIOUS  HERNIA MESH, BILATERAL SUBCUTANEOUS SKIN FLAPS, RIGHT SIDED EXTERNAL OBLIQUE RELEASE, VENTRAL  HERNIA REPAIR WITH MESH UNDERLAY AND SMALL BOWEL RESECTION WITH PRIMARY ANASTOMOSIS;  Surgeon: Poncho Nelson MD;  Location: [facility] OR;  Service: General     Family History   Problem Relation Age of Onset   • Cancer " Brother    • Diabetes Son      Immunization History   Administered Date(s) Administered   • COVID-19 (MODERNA) 1st, 2nd, 3rd Dose Only 02/03/2021, 03/03/2021   • COVID-19 (PFIZER) PURPLE CAP 02/04/2021   • Pneumococcal Polysaccharide (PPSV23) 08/26/2021       Office Visit on 02/17/2022   Component Date Value Ref Range Status   • Glucose 02/17/2022 92  65 - 99 mg/dL Final   • BUN 02/17/2022 15  8 - 27 mg/dL Final   • Creatinine 02/17/2022 0.89  0.76 - 1.27 mg/dL Final   • eGFR Non  Am 02/17/2022 78  >59 mL/min/1.73 Final   • eGFR African Am 02/17/2022 90  >59 mL/min/1.73 Final    Comment: **In accordance with recommendations from the NKF-ASN Task force,**    [facility] eGFR calculation to the    2021 CKD-EPI creatinine equation that estimates kidney function    without a race variable.     • BUN/Creatinine Ratio 02/17/2022 17  10 - 24 Final   • Sodium 02/17/2022 139  134 - 144 mmol/L Final   • Potassium 02/17/2022 4.8  3.5 - 5.2 mmol/L Final   • Chloride 02/17/2022 102  96 - 106 mmol/L Final   • Total CO2 02/17/2022 21  20 - 29 mmol/L Final   • Calcium 02/17/2022 9.2  8.6 - 10.2 mg/dL Final   • Total Protein 02/17/2022 6.9  6.0 - 8.5 g/dL Final   • Albumin 02/17/2022 3.9  3.6 - 4.6 g/dL Final   • Globulin 02/17/2022 3.0  1.5 - 4.5 g/dL Final   • A/G Ratio 02/17/2022 1.3  1.2 - 2.2 Final   • Total Bilirubin 02/17/2022 0.3  0.0 - 1.2 mg/dL Final   • Alkaline Phosphatase 02/17/2022 71  44 - 121 IU/L Final   • AST (SGOT) 02/17/2022 18  0 - 40 IU/L Final   • ALT (SGPT) 02/17/2022 6  0 - 44 IU/L Final   • TSH 02/17/2022 1.370  0.450 - 4.500 uIU/mL Final   • Free T4 02/17/2022 1.19  0.82 - 1.77 ng/dL Final   • T3, Total 02/17/2022 101  71 - 180 ng/dL Final   • Total Cholesterol 02/17/2022 166  100 - 199 mg/dL Final   • Triglycerides 02/17/2022 79  0 - 149 mg/dL Final   • HDL Cholesterol 02/17/2022 39 (A) >39 mg/dL Final   • VLDL Cholesterol Avi 02/17/2022 15  5 - 40 mg/dL Final   • LDL  Chol Calc ([facility]) 02/17/2022 112 (A) 0 - 99 mg/dL Final   • LDL/HDL RATIO 02/17/2022 2.9  0.0 - 3.6 ratio Final    Comment:                                     LDL/HDL Ratio                                              Men  Women                                1/2 Avg.Risk  1.0    1.5                                    Avg.Risk  3.6    3.2                                 2X Avg.Risk  6.2    5.0                                 3X Avg.Risk  8.0    6.1           Review of Systems   Constitutional: Negative.    HENT: Negative.    Respiratory: Negative.    Cardiovascular: Negative.    Gastrointestinal: Negative.    Genitourinary: Negative.    Musculoskeletal: Negative.    Skin: Negative.    Neurological: Negative.    Psychiatric/Behavioral: Negative.        Objective   Physical Exam  HENT:      Head: Normocephalic.   Cardiovascular:      Rate and Rhythm: Normal rate and regular rhythm.      Pulses: Normal pulses.      Heart sounds: Normal heart sounds.   Pulmonary:      Effort: Pulmonary effort is normal.   Abdominal:      General: Bowel sounds are normal.   Musculoskeletal:         General: Normal range of motion.   Skin:     General: Skin is warm and dry.   Neurological:      General: No focal deficit present.      Mental Status: He is alert and oriented to person, place, and time.   Psychiatric:         Mood and Affect: Mood normal.         Behavior: Behavior normal.         Procedures    Assessment & Plan   Diagnoses and all orders for this visit:    1. Acquired hypothyroidism (Primary)  -     TSH+Free T4  -     T3    2. Preventative health care  -     CBC & Differential    3. BMI 27.0-27.9,adult    Other orders  -     levothyroxine (Euthyrox) 100 MCG tablet; Take 1 tablet by mouth Daily.  Dispense: 90 tablet; Refill: 1  -     loratadine (Claritin) 10 MG tablet; Take 1 tablet by mouth Daily.  Dispense: 30 tablet; Refill: 6          Current Outpatient Medications:   •  aspirin 81 MG chewable tablet, Chew 81 mg Daily.,  Disp: , Rfl:   •  levothyroxine (Euthyrox) 100 MCG tablet, Take 1 tablet by mouth Daily., Disp: 90 tablet, Rfl: 1  •  loratadine (Claritin) 10 MG tablet, Take 1 tablet by mouth Daily., Disp: 30 tablet, Rfl: 6           Radha Greene, APRN 6/30/2022 08:33 EDT  This note has been electronically signed

## 2022-07-01 LAB
BASOPHILS # BLD AUTO: 0.1 X10E3/UL (ref 0–0.2)
BASOPHILS NFR BLD AUTO: 1 %
EOSINOPHIL # BLD AUTO: 0.4 X10E3/UL (ref 0–0.4)
EOSINOPHIL NFR BLD AUTO: 4 %
ERYTHROCYTE [DISTWIDTH] IN BLOOD BY AUTOMATED COUNT: 13.7 % (ref 11.6–15.4)
HCT VFR BLD AUTO: 41.7 % (ref 37.5–51)
HGB BLD-MCNC: 13.4 G/DL (ref 13–17.7)
IMM GRANULOCYTES # BLD AUTO: 0 X10E3/UL (ref 0–0.1)
IMM GRANULOCYTES NFR BLD AUTO: 0 %
LYMPHOCYTES # BLD AUTO: 1.9 X10E3/UL (ref 0.7–3.1)
LYMPHOCYTES NFR BLD AUTO: 19 %
MCH RBC QN AUTO: 30.6 PG (ref 26.6–33)
MCHC RBC AUTO-ENTMCNC: 32.1 G/DL (ref 31.5–35.7)
MCV RBC AUTO: 95 FL (ref 79–97)
MONOCYTES # BLD AUTO: 0.8 X10E3/UL (ref 0.1–0.9)
MONOCYTES NFR BLD AUTO: 8 %
NEUTROPHILS # BLD AUTO: 6.4 X10E3/UL (ref 1.4–7)
NEUTROPHILS NFR BLD AUTO: 68 %
PLATELET # BLD AUTO: 247 X10E3/UL (ref 150–450)
RBC # BLD AUTO: 4.38 X10E6/UL (ref 4.14–5.8)
T3 SERPL-MCNC: 96 NG/DL (ref 71–180)
T4 FREE SERPL-MCNC: 0.95 NG/DL (ref 0.82–1.77)
TSH SERPL DL<=0.005 MIU/L-ACNC: 6.04 UIU/ML (ref 0.45–4.5)
WBC # BLD AUTO: 9.6 X10E3/UL (ref 3.4–10.8)

## 2022-07-03 RX ORDER — LEVOTHYROXINE SODIUM 112 UG/1
112 TABLET ORAL DAILY
Qty: 90 TABLET | Refills: 1 | Status: SHIPPED | OUTPATIENT
Start: 2022-07-03 | End: 2023-02-01

## 2022-10-05 ENCOUNTER — TELEPHONE (OUTPATIENT)
Dept: FAMILY MEDICINE CLINIC | Facility: CLINIC | Age: 87
End: 2022-10-05

## 2022-10-05 DIAGNOSIS — E03.9 ACQUIRED HYPOTHYROIDISM: Primary | ICD-10-CM

## 2022-10-05 NOTE — TELEPHONE ENCOUNTER
Pt is supposed to have repeat thyroid labs.  He's coming in for those in the morning.  Can you please put them in for me?

## 2022-10-13 ENCOUNTER — FLU SHOT (OUTPATIENT)
Dept: FAMILY MEDICINE CLINIC | Facility: CLINIC | Age: 87
End: 2022-10-13

## 2022-10-13 DIAGNOSIS — Z23 NEED FOR INFLUENZA VACCINATION: Primary | ICD-10-CM

## 2022-10-13 PROCEDURE — 90662 IIV NO PRSV INCREASED AG IM: CPT | Performed by: NURSE PRACTITIONER

## 2022-10-13 PROCEDURE — G0008 ADMIN INFLUENZA VIRUS VAC: HCPCS | Performed by: NURSE PRACTITIONER

## 2023-01-05 ENCOUNTER — OFFICE VISIT (OUTPATIENT)
Dept: FAMILY MEDICINE CLINIC | Facility: CLINIC | Age: 88
End: 2023-01-05
Payer: MEDICARE

## 2023-01-05 VITALS
RESPIRATION RATE: 18 BRPM | SYSTOLIC BLOOD PRESSURE: 136 MMHG | TEMPERATURE: 97.1 F | DIASTOLIC BLOOD PRESSURE: 74 MMHG | HEART RATE: 66 BPM | HEIGHT: 66 IN | WEIGHT: 171 LBS | OXYGEN SATURATION: 96 % | BODY MASS INDEX: 27.48 KG/M2

## 2023-01-05 DIAGNOSIS — E66.3 OVERWEIGHT WITH BODY MASS INDEX (BMI) OF 28 TO 28.9 IN ADULT: ICD-10-CM

## 2023-01-05 DIAGNOSIS — Z00.00 PREVENTATIVE HEALTH CARE: ICD-10-CM

## 2023-01-05 DIAGNOSIS — E78.2 MIXED HYPERLIPIDEMIA: ICD-10-CM

## 2023-01-05 DIAGNOSIS — E03.9 ACQUIRED HYPOTHYROIDISM: Primary | Chronic | ICD-10-CM

## 2023-01-05 PROCEDURE — 99213 OFFICE O/P EST LOW 20 MIN: CPT | Performed by: NURSE PRACTITIONER

## 2023-01-05 NOTE — PROGRESS NOTES
Christiano Guzmán is a 87 y.o. male.     History of Present Illness  87-year-old white male with hypertension BPH, hypothyroidism, incarcerated hernia and insomnia who comes in today for 6-month follow-up visit and fasting blood work    Blood pressure 136/74 heart rate 66 he denies any chest pain, tachycardia, dizziness or edema    On last visit patient's thyroid was quite elevated we increased his Synthroid we will be checking thyroid levels today as well as other fasting labs    Weight is up 3 pounds 170.  He is up-to-date on flu shot COVID vaccines and needs to schedule an eye exam.  Due to his age he no longer does PSAs or colon cancer screening              Fasting blood work  Schedule eye exam  Consider getting COVID booster  Follow-up 6 months       The following portions of the patient's history were reviewed and updated as appropriate: allergies, current medications, past family history, past medical history, past social history, past surgical history and problem list.    Vitals:    01/05/23 0759   BP: 136/74   BP Location: Right arm   Patient Position: Sitting   Cuff Size: Adult   Pulse: 66   Resp: 18   Temp: 97.1 °F (36.2 °C)   TempSrc: Infrared   SpO2: 96%   Weight: 77.6 kg (171 lb)   Height: 166.4 cm (65.5\")     Body mass index is 28.02 kg/m².    Past Medical History:   Diagnosis Date   • Disease of thyroid gland    • Hypothyroidism      Past Surgical History:   Procedure Laterality Date   • APPENDECTOMY     • CYSTOSCOPY N/A 10/22/2019    Procedure: CYSTOSCOPY FLEXIBLE AT BEDSIDE WITH MCGARRY CATHETER PLACEMENT;  Surgeon: Mynor Amor MD;  Location: [facility];  Service: Urology   • HERNIA REPAIR      multiple repairs to hernia, pt states 6   • VENTRAL/INCISIONAL HERNIA REPAIR N/A 10/22/2019    Procedure: EXPLORATORY LAPAROTOMY, EXTENSIVE LYSIS OF ADHESIONS, EXPLANT OF PREVIOUS  HERNIA MESH, BILATERAL SUBCUTANEOUS SKIN FLAPS, RIGHT SIDED EXTERNAL OBLIQUE RELEASE, VENTRAL  HERNIA REPAIR WITH  MESH UNDERLAY AND SMALL BOWEL RESECTION WITH PRIMARY ANASTOMOSIS;  Surgeon: Poncho Nelson MD;  Location: [facility] MAIN OR;  Service: General     Family History   Problem Relation Age of Onset   • Cancer Brother    • Diabetes Son      Immunization History   Administered Date(s) Administered   • COVID-19 (MODERNA) 1st, 2nd, 3rd Dose Only 02/03/2021, 03/03/2021   • COVID-19 (PFIZER) PURPLE CAP 02/04/2021   • Fluzone High-Dose 65+yrs 10/13/2022   • Pneumococcal Polysaccharide (PPSV23) 08/26/2021       Results Encounter on 10/06/2022   Component Date Value Ref Range Status   • T4, Total 10/13/2022 4.7  4.5 - 12.0 ug/dL Final   • TSH 10/13/2022 8.180 (H)  0.450 - 4.500 uIU/mL Final   • T3, Free 10/13/2022 2.3  2.0 - 4.4 pg/mL Final         Review of Systems   Constitutional: Negative.    HENT: Negative.    Respiratory: Negative.    Cardiovascular: Negative.    Gastrointestinal: Negative.    Genitourinary: Negative.    Musculoskeletal: Negative.    Skin: Negative.    Neurological: Negative.    Psychiatric/Behavioral: Negative.        Objective   Physical Exam  Constitutional:       Appearance: Normal appearance.   HENT:      Head: Normocephalic.   Cardiovascular:      Rate and Rhythm: Normal rate and regular rhythm.      Pulses: Normal pulses.      Heart sounds: Normal heart sounds.   Pulmonary:      Effort: Pulmonary effort is normal.      Breath sounds: Normal breath sounds.   Abdominal:      General: Bowel sounds are normal.   Musculoskeletal:         General: Normal range of motion.   Skin:     General: Skin is warm.   Neurological:      General: No focal deficit present.      Mental Status: He is alert and oriented to person, place, and time.   Psychiatric:         Mood and Affect: Mood normal.         Behavior: Behavior normal.         Procedures    Assessment & Plan   Diagnoses and all orders for this visit:    1. Acquired hypothyroidism (Primary)  -     TSH+Free T4  -     T3    2. Preventative health care  -      CBC & Differential  -     Comprehensive Metabolic Panel    3. Mixed hyperlipidemia  -     Lipid Panel With LDL / HDL Ratio    4. Overweight with body mass index (BMI) of 28 to 28.9 in adult          Current Outpatient Medications:   •  aspirin 81 MG chewable tablet, Chew 81 mg Daily., Disp: , Rfl:   •  levothyroxine (Synthroid) 112 MCG tablet, Take 1 tablet by mouth Daily., Disp: 90 tablet, Rfl: 1  •  loratadine (Claritin) 10 MG tablet, Take 1 tablet by mouth Daily., Disp: 30 tablet, Rfl: 6           Radha Greene, APRN 1/5/2023 08:29 EST  This note has been electronically signed

## 2023-01-06 LAB
ALBUMIN SERPL-MCNC: 4.2 G/DL (ref 3.6–4.6)
ALBUMIN/GLOB SERPL: 1.7 {RATIO} (ref 1.2–2.2)
ALP SERPL-CCNC: 62 IU/L (ref 44–121)
ALT SERPL-CCNC: 8 IU/L (ref 0–44)
AST SERPL-CCNC: 18 IU/L (ref 0–40)
BASOPHILS # BLD AUTO: 0.1 X10E3/UL (ref 0–0.2)
BASOPHILS NFR BLD AUTO: 1 %
BILIRUB SERPL-MCNC: 0.4 MG/DL (ref 0–1.2)
BUN SERPL-MCNC: 15 MG/DL (ref 8–27)
BUN/CREAT SERPL: 15 (ref 10–24)
CALCIUM SERPL-MCNC: 9.5 MG/DL (ref 8.6–10.2)
CHLORIDE SERPL-SCNC: 102 MMOL/L (ref 96–106)
CHOLEST SERPL-MCNC: 169 MG/DL (ref 100–199)
CO2 SERPL-SCNC: 26 MMOL/L (ref 20–29)
CREAT SERPL-MCNC: 0.97 MG/DL (ref 0.76–1.27)
EGFRCR SERPLBLD CKD-EPI 2021: 76 ML/MIN/1.73
EOSINOPHIL # BLD AUTO: 0.4 X10E3/UL (ref 0–0.4)
EOSINOPHIL NFR BLD AUTO: 4 %
ERYTHROCYTE [DISTWIDTH] IN BLOOD BY AUTOMATED COUNT: 13 % (ref 11.6–15.4)
GLOBULIN SER CALC-MCNC: 2.5 G/DL (ref 1.5–4.5)
GLUCOSE SERPL-MCNC: 96 MG/DL (ref 70–99)
HCT VFR BLD AUTO: 42.3 % (ref 37.5–51)
HDLC SERPL-MCNC: 39 MG/DL
HGB BLD-MCNC: 14.1 G/DL (ref 13–17.7)
IMM GRANULOCYTES # BLD AUTO: 0 X10E3/UL (ref 0–0.1)
IMM GRANULOCYTES NFR BLD AUTO: 0 %
LDLC SERPL CALC-MCNC: 115 MG/DL (ref 0–99)
LDLC/HDLC SERPL: 2.9 RATIO (ref 0–3.6)
LYMPHOCYTES # BLD AUTO: 2.2 X10E3/UL (ref 0.7–3.1)
LYMPHOCYTES NFR BLD AUTO: 22 %
MCH RBC QN AUTO: 30.4 PG (ref 26.6–33)
MCHC RBC AUTO-ENTMCNC: 33.3 G/DL (ref 31.5–35.7)
MCV RBC AUTO: 91 FL (ref 79–97)
MONOCYTES # BLD AUTO: 0.9 X10E3/UL (ref 0.1–0.9)
MONOCYTES NFR BLD AUTO: 9 %
NEUTROPHILS # BLD AUTO: 6.2 X10E3/UL (ref 1.4–7)
NEUTROPHILS NFR BLD AUTO: 64 %
PLATELET # BLD AUTO: 288 X10E3/UL (ref 150–450)
POTASSIUM SERPL-SCNC: 5 MMOL/L (ref 3.5–5.2)
PROT SERPL-MCNC: 6.7 G/DL (ref 6–8.5)
RBC # BLD AUTO: 4.64 X10E6/UL (ref 4.14–5.8)
SODIUM SERPL-SCNC: 139 MMOL/L (ref 134–144)
T3 SERPL-MCNC: 92 NG/DL (ref 71–180)
T4 FREE SERPL-MCNC: 1.18 NG/DL (ref 0.82–1.77)
TRIGL SERPL-MCNC: 82 MG/DL (ref 0–149)
TSH SERPL DL<=0.005 MIU/L-ACNC: 3.19 UIU/ML (ref 0.45–4.5)
VLDLC SERPL CALC-MCNC: 15 MG/DL (ref 5–40)
WBC # BLD AUTO: 9.7 X10E3/UL (ref 3.4–10.8)

## 2023-02-01 RX ORDER — LEVOTHYROXINE SODIUM 112 UG/1
TABLET ORAL
Qty: 90 TABLET | Refills: 0 | Status: SHIPPED | OUTPATIENT
Start: 2023-02-01

## 2023-02-13 RX ORDER — LEVOTHYROXINE SODIUM 112 UG/1
112 TABLET ORAL DAILY
Qty: 90 TABLET | Refills: 0 | Status: CANCELLED | OUTPATIENT
Start: 2023-02-13

## 2023-02-13 NOTE — TELEPHONE ENCOUNTER
Pt has misplaced his bottles for medication.  The pharmacy says it too early to refill the medication.  Daughter states he is completely out of medication.  Can we send in new rx?

## 2023-03-04 RX ORDER — BENZOYL PEROXIDE
KIT TOPICAL
Qty: 30 TABLET | Refills: 0 | Status: SHIPPED | OUTPATIENT
Start: 2023-03-04 | End: 2023-04-05

## 2023-04-05 RX ORDER — LORATADINE 10 MG/1
TABLET ORAL
Qty: 30 TABLET | Refills: 0 | Status: SHIPPED | OUTPATIENT
Start: 2023-04-05

## 2023-05-10 RX ORDER — LEVOTHYROXINE SODIUM 112 UG/1
TABLET ORAL
Qty: 90 TABLET | Refills: 0 | Status: SHIPPED | OUTPATIENT
Start: 2023-05-10

## 2023-05-10 RX ORDER — LORATADINE 10 MG/1
TABLET ORAL
Qty: 30 TABLET | Refills: 0 | Status: SHIPPED | OUTPATIENT
Start: 2023-05-10

## 2023-06-12 RX ORDER — LORATADINE 10 MG/1
TABLET ORAL
Qty: 30 TABLET | Refills: 0 | Status: SHIPPED | OUTPATIENT
Start: 2023-06-12

## 2023-07-27 ENCOUNTER — OFFICE VISIT (OUTPATIENT)
Dept: FAMILY MEDICINE CLINIC | Facility: CLINIC | Age: 88
End: 2023-07-27
Payer: MEDICARE

## 2023-07-27 VITALS
DIASTOLIC BLOOD PRESSURE: 62 MMHG | SYSTOLIC BLOOD PRESSURE: 108 MMHG | WEIGHT: 161.1 LBS | HEART RATE: 58 BPM | OXYGEN SATURATION: 92 % | BODY MASS INDEX: 25.89 KG/M2 | HEIGHT: 66 IN | TEMPERATURE: 97.1 F

## 2023-07-27 DIAGNOSIS — E03.9 ACQUIRED HYPOTHYROIDISM: Primary | Chronic | ICD-10-CM

## 2023-07-27 DIAGNOSIS — G47.09 OTHER INSOMNIA: ICD-10-CM

## 2023-07-27 DIAGNOSIS — Z00.00 PREVENTATIVE HEALTH CARE: ICD-10-CM

## 2023-07-27 DIAGNOSIS — Z13.220 LIPID SCREENING: ICD-10-CM

## 2023-07-27 DIAGNOSIS — R53.83 OTHER FATIGUE: ICD-10-CM

## 2023-07-27 DIAGNOSIS — Z23 NEED FOR PNEUMOCOCCAL 20-VALENT CONJUGATE VACCINATION: ICD-10-CM

## 2023-07-27 RX ORDER — LEVOTHYROXINE SODIUM 112 UG/1
112 TABLET ORAL DAILY
Qty: 90 TABLET | Refills: 1 | Status: SHIPPED | OUTPATIENT
Start: 2023-07-27

## 2023-07-27 RX ORDER — HYDROXYZINE HYDROCHLORIDE 25 MG/1
TABLET, FILM COATED ORAL
Qty: 14 TABLET | Refills: 0 | Status: SHIPPED | OUTPATIENT
Start: 2023-07-27

## 2023-07-27 NOTE — PATIENT INSTRUCTIONS
Hydroxyzine 25 mg 1-2 nightly monitor for drowsiness  Fasting blood work  Be careful going out on extremely hot days coming to the house frequently to cool off and stay hydrated  Follow-up 3 6 months

## 2023-07-27 NOTE — PROGRESS NOTES
"Christiano Guzmán is a 88 y.o. male.     History of Present Illness  88-year-old white male with history hypertension, BPH, hypothyroidism, incarcerated hernia and insomnia who comes in today with daughter for Medicare wellness visit    Blood pressure 100/62 heart rate 58 he denies any chest pain, dyspnea, tachycardia or dizziness    Patient daughter concerned that patient has appeared to be tired and fatigued.  Patient does do a lot of work outside in the heat is probably a factor here he is also not sleeping at all either.  Get a put him on a low-dose of hydroxyzine and told daughter to be careful and monitor him to see if it helps him sleep at night he will let me know if they want to reorder.  We will also be rechecking thyroid levels    Weight is down 10 pounds at 161 for BMI 26.4 and patient has not been trying to lose weight.  He is up-to-date on COVID and pneumonia vaccines.  He has an appointment next month for eye exam and due to his age does no other preventative maintenance            Hydroxyzine 25 mg 1-2 nightly monitor for drowsiness  Fasting blood work  Be careful going out on extremely hot days coming to the house frequently to cool off and stay hydrated  Follow-up 3 6 months         The following portions of the patient's history were reviewed and updated as appropriate: allergies, current medications, past family history, past medical history, past social history, past surgical history, and problem list.    Vitals:    07/27/23 0835   BP: 108/62   BP Location: Right arm   Patient Position: Sitting   Cuff Size: Adult   Pulse: 58   Temp: 97.1 °F (36.2 °C)   TempSrc: Infrared   SpO2: 92%   Weight: 73.1 kg (161 lb 1.6 oz)   Height: 166.4 cm (65.51\")     Body mass index is 26.39 kg/m².    Past Medical History:   Diagnosis Date    Disease of thyroid gland     Hypothyroidism      Past Surgical History:   Procedure Laterality Date    APPENDECTOMY      CYSTOSCOPY N/A 10/22/2019    Procedure: CYSTOSCOPY " FLEXIBLE AT BEDSIDE WITH MCGARRY CATHETER PLACEMENT;  Surgeon: Mynor Amor MD;  Location: [facility] MAIN OR;  Service: Urology    HERNIA REPAIR      multiple repairs to hernia, pt states 7    VENTRAL/INCISIONAL HERNIA REPAIR N/A 10/22/2019    Procedure: EXPLORATORY LAPAROTOMY, EXTENSIVE LYSIS OF ADHESIONS, EXPLANT OF PREVIOUS  HERNIA MESH, BILATERAL SUBCUTANEOUS SKIN FLAPS, RIGHT SIDED EXTERNAL OBLIQUE RELEASE, VENTRAL  HERNIA REPAIR WITH MESH UNDERLAY AND SMALL BOWEL RESECTION WITH PRIMARY ANASTOMOSIS;  Surgeon: Poncho Nelson MD;  Location: [facility] MAIN OR;  Service: General     Family History   Problem Relation Age of Onset    Cancer Brother     Diabetes Son      Immunization History   Administered Date(s) Administered    COVID-19 (MODERNA) 1st,2nd,3rd Dose Monovalent 02/03/2021, 03/03/2021    COVID-19 (PFIZER) Purple Cap Monovalent 02/04/2021    Fluzone High-Dose 65+yrs 10/13/2022    Pneumococcal Polysaccharide (PPSV23) 08/26/2021       Office Visit on 01/05/2023   Component Date Value Ref Range Status    WBC 01/05/2023 9.7  3.4 - 10.8 x10E3/uL Final    RBC 01/05/2023 4.64  4.14 - 5.80 x10E6/uL Final    Hemoglobin 01/05/2023 14.1  13.0 - 17.7 g/dL Final    Hematocrit 01/05/2023 42.3  37.5 - 51.0 % Final    MCV 01/05/2023 91  79 - 97 fL Final    MCH 01/05/2023 30.4  26.6 - 33.0 pg Final    MCHC 01/05/2023 33.3  31.5 - 35.7 g/dL Final    RDW 01/05/2023 13.0  11.6 - 15.4 % Final    Platelets 01/05/2023 288  150 - 450 x10E3/uL Final    Neutrophil Rel % 01/05/2023 64  Not Estab. % Final    Lymphocyte Rel % 01/05/2023 22  Not Estab. % Final    Monocyte Rel % 01/05/2023 9  Not Estab. % Final    Eosinophil Rel % 01/05/2023 4  Not Estab. % Final    Basophil Rel % 01/05/2023 1  Not Estab. % Final    Neutrophils Absolute 01/05/2023 6.2  1.4 - 7.0 x10E3/uL Final    Lymphocytes Absolute 01/05/2023 2.2  0.7 - 3.1 x10E3/uL Final    Monocytes Absolute 01/05/2023 0.9  0.1 - 0.9 x10E3/uL Final    Eosinophils Absolute  01/05/2023 0.4  0.0 - 0.4 x10E3/uL Final    Basophils Absolute 01/05/2023 0.1  0.0 - 0.2 x10E3/uL Final    Immature Granulocyte Rel % 01/05/2023 0  Not Estab. % Final    Immature Grans Absolute 01/05/2023 0.0  0.0 - 0.1 x10E3/uL Final    Glucose 01/05/2023 96  70 - 99 mg/dL Final    BUN 01/05/2023 15  8 - 27 mg/dL Final    Creatinine 01/05/2023 0.97  0.76 - 1.27 mg/dL Final    EGFR Result 01/05/2023 76  >59 mL/min/1.73 Final    BUN/Creatinine Ratio 01/05/2023 15  10 - 24 Final    Sodium 01/05/2023 139  134 - 144 mmol/L Final    Potassium 01/05/2023 5.0  3.5 - 5.2 mmol/L Final    Chloride 01/05/2023 102  96 - 106 mmol/L Final    Total CO2 01/05/2023 26  20 - 29 mmol/L Final    Calcium 01/05/2023 9.5  8.6 - 10.2 mg/dL Final    Total Protein 01/05/2023 6.7  6.0 - 8.5 g/dL Final    Albumin 01/05/2023 4.2  3.6 - 4.6 g/dL Final    Globulin 01/05/2023 2.5  1.5 - 4.5 g/dL Final    A/G Ratio 01/05/2023 1.7  1.2 - 2.2 Final    Total Bilirubin 01/05/2023 0.4  0.0 - 1.2 mg/dL Final    Alkaline Phosphatase 01/05/2023 62  44 - 121 IU/L Final    AST (SGOT) 01/05/2023 18  0 - 40 IU/L Final    ALT (SGPT) 01/05/2023 8  0 - 44 IU/L Final    Total Cholesterol 01/05/2023 169  100 - 199 mg/dL Final    Triglycerides 01/05/2023 82  0 - 149 mg/dL Final    HDL Cholesterol 01/05/2023 39 (L)  >39 mg/dL Final    VLDL Cholesterol Avi 01/05/2023 15  5 - 40 mg/dL Final    LDL Chol Calc (NIH) 01/05/2023 115 (H)  0 - 99 mg/dL Final    LDL/HDL RATIO 01/05/2023 2.9  0.0 - 3.6 ratio Final    Comment:                                     LDL/HDL Ratio                                              Men  Women                                1/2 Avg.Risk  1.0    1.5                                    Avg.Risk  3.6    3.2                                 2X Avg.Risk  6.2    5.0                                 3X Avg.Risk  8.0    6.1      TSH 01/05/2023 3.190  0.450 - 4.500 uIU/mL Final    Free T4 01/05/2023 1.18  0.82 - 1.77 ng/dL Final    T3, Total 01/05/2023  92  71 - 180 ng/dL Final         Review of Systems   Constitutional:  Positive for fatigue and unexpected weight loss.   HENT: Negative.     Respiratory: Negative.     Cardiovascular: Negative.    Gastrointestinal: Negative.    Genitourinary: Negative.    Musculoskeletal: Negative.    Skin: Negative.    Neurological: Negative.    Psychiatric/Behavioral:  Positive for sleep disturbance.      Objective   Physical Exam  Constitutional:       Appearance: Normal appearance.   HENT:      Head: Normocephalic.   Cardiovascular:      Rate and Rhythm: Normal rate and regular rhythm.      Pulses: Normal pulses.      Heart sounds: Normal heart sounds.   Pulmonary:      Effort: Pulmonary effort is normal.      Breath sounds: Normal breath sounds.   Abdominal:      General: Bowel sounds are normal.   Musculoskeletal:         General: Normal range of motion.   Skin:     General: Skin is warm and dry.   Neurological:      General: No focal deficit present.      Mental Status: He is alert and oriented to person, place, and time.   Psychiatric:         Mood and Affect: Mood normal.         Behavior: Behavior normal.       Procedures    Assessment & Plan   Diagnoses and all orders for this visit:    1. Acquired hypothyroidism (Primary)  -     TSH+Free T4  -     T3    2. Preventative health care  -     CBC & Differential  -     Comprehensive Metabolic Panel    3. Lipid screening  -     Lipid Panel With LDL / HDL Ratio    4. Need for pneumococcal 20-valent conjugate vaccination    5. BMI 26.0-26.9,adult    6. Other fatigue    7. Other insomnia    Other orders  -     hydrOXYzine (ATARAX) 25 MG tablet; 1-2  tabs 30 minutes before bed  Dispense: 14 tablet; Refill: 0  -     levothyroxine (SYNTHROID, LEVOTHROID) 112 MCG tablet; Take 1 tablet by mouth Daily.  Dispense: 90 tablet; Refill: 1            Current Outpatient Medications:     aspirin 81 MG chewable tablet, Chew 1 tablet Daily., Disp: , Rfl:     EQ Allergy Relief 10 MG tablet, Take 1  tablet by mouth once daily, Disp: 30 tablet, Rfl: 0    levothyroxine (SYNTHROID, LEVOTHROID) 112 MCG tablet, Take 1 tablet by mouth Daily., Disp: 90 tablet, Rfl: 1    hydrOXYzine (ATARAX) 25 MG tablet, 1-2  tabs 30 minutes before bed, Disp: 14 tablet, Rfl: 0           Radha Greene, CHRISTIAN 7/27/2023 10:01 EDT  This note has been electronically signed

## 2023-07-28 LAB
ALBUMIN SERPL-MCNC: 4 G/DL (ref 3.7–4.7)
ALBUMIN/GLOB SERPL: 1.3 {RATIO} (ref 1.2–2.2)
ALP SERPL-CCNC: 80 IU/L (ref 44–121)
ALT SERPL-CCNC: 31 IU/L (ref 0–44)
AST SERPL-CCNC: 39 IU/L (ref 0–40)
BASOPHILS # BLD AUTO: 0.1 X10E3/UL (ref 0–0.2)
BASOPHILS NFR BLD AUTO: 1 %
BILIRUB SERPL-MCNC: 0.5 MG/DL (ref 0–1.2)
BUN SERPL-MCNC: 16 MG/DL (ref 8–27)
BUN/CREAT SERPL: 15 (ref 10–24)
CALCIUM SERPL-MCNC: 9 MG/DL (ref 8.6–10.2)
CHLORIDE SERPL-SCNC: 103 MMOL/L (ref 96–106)
CHOLEST SERPL-MCNC: 182 MG/DL (ref 100–199)
CO2 SERPL-SCNC: 23 MMOL/L (ref 20–29)
CREAT SERPL-MCNC: 1.08 MG/DL (ref 0.76–1.27)
EGFRCR SERPLBLD CKD-EPI 2021: 66 ML/MIN/1.73
EOSINOPHIL # BLD AUTO: 0.1 X10E3/UL (ref 0–0.4)
EOSINOPHIL NFR BLD AUTO: 1 %
ERYTHROCYTE [DISTWIDTH] IN BLOOD BY AUTOMATED COUNT: 13.3 % (ref 11.6–15.4)
GLOBULIN SER CALC-MCNC: 3.2 G/DL (ref 1.5–4.5)
GLUCOSE SERPL-MCNC: 98 MG/DL (ref 70–99)
HCT VFR BLD AUTO: 42 % (ref 37.5–51)
HDLC SERPL-MCNC: 35 MG/DL
HGB BLD-MCNC: 13.8 G/DL (ref 13–17.7)
IMM GRANULOCYTES # BLD AUTO: 0 X10E3/UL (ref 0–0.1)
IMM GRANULOCYTES NFR BLD AUTO: 0 %
LDLC SERPL CALC-MCNC: 122 MG/DL (ref 0–99)
LDLC/HDLC SERPL: 3.5 RATIO (ref 0–3.6)
LYMPHOCYTES # BLD AUTO: 3.3 X10E3/UL (ref 0.7–3.1)
LYMPHOCYTES NFR BLD AUTO: 46 %
MCH RBC QN AUTO: 30 PG (ref 26.6–33)
MCHC RBC AUTO-ENTMCNC: 32.9 G/DL (ref 31.5–35.7)
MCV RBC AUTO: 91 FL (ref 79–97)
MONOCYTES # BLD AUTO: 0.9 X10E3/UL (ref 0.1–0.9)
MONOCYTES NFR BLD AUTO: 12 %
NEUTROPHILS # BLD AUTO: 2.8 X10E3/UL (ref 1.4–7)
NEUTROPHILS NFR BLD AUTO: 40 %
PLATELET # BLD AUTO: 201 X10E3/UL (ref 150–450)
POTASSIUM SERPL-SCNC: 4.9 MMOL/L (ref 3.5–5.2)
PROT SERPL-MCNC: 7.2 G/DL (ref 6–8.5)
RBC # BLD AUTO: 4.6 X10E6/UL (ref 4.14–5.8)
SODIUM SERPL-SCNC: 139 MMOL/L (ref 134–144)
T3 SERPL-MCNC: 111 NG/DL (ref 71–180)
T4 FREE SERPL-MCNC: 1.29 NG/DL (ref 0.82–1.77)
TRIGL SERPL-MCNC: 140 MG/DL (ref 0–149)
TSH SERPL DL<=0.005 MIU/L-ACNC: 2.8 UIU/ML (ref 0.45–4.5)
VLDLC SERPL CALC-MCNC: 25 MG/DL (ref 5–40)
WBC # BLD AUTO: 7.1 X10E3/UL (ref 3.4–10.8)

## 2023-08-19 RX ORDER — LORATADINE 10 MG/1
TABLET ORAL
Qty: 30 TABLET | Refills: 0 | Status: SHIPPED | OUTPATIENT
Start: 2023-08-19

## 2023-09-20 RX ORDER — LORATADINE 10 MG/1
TABLET ORAL
Qty: 30 TABLET | Refills: 0 | Status: SHIPPED | OUTPATIENT
Start: 2023-09-20

## 2023-10-23 RX ORDER — HYDROXYZINE HYDROCHLORIDE 25 MG/1
TABLET, FILM COATED ORAL
Qty: 14 TABLET | Refills: 0 | Status: SHIPPED | OUTPATIENT
Start: 2023-10-23

## 2023-10-23 RX ORDER — LORATADINE 10 MG/1
TABLET ORAL
Qty: 30 TABLET | Refills: 0 | Status: SHIPPED | OUTPATIENT
Start: 2023-10-23

## 2023-11-27 RX ORDER — LORATADINE 10 MG/1
TABLET ORAL
Qty: 30 TABLET | Refills: 0 | Status: SHIPPED | OUTPATIENT
Start: 2023-11-27

## 2024-01-22 RX ORDER — LORATADINE 10 MG/1
10 TABLET ORAL DAILY
Qty: 30 TABLET | Refills: 0 | Status: SHIPPED | OUTPATIENT
Start: 2024-01-22

## 2024-02-12 RX ORDER — LEVOTHYROXINE SODIUM 112 UG/1
112 TABLET ORAL DAILY
Qty: 30 TABLET | Refills: 0 | Status: SHIPPED | OUTPATIENT
Start: 2024-02-12

## 2024-02-22 ENCOUNTER — OFFICE VISIT (OUTPATIENT)
Dept: FAMILY MEDICINE CLINIC | Facility: CLINIC | Age: 89
End: 2024-02-22
Payer: MEDICARE

## 2024-02-22 VITALS
OXYGEN SATURATION: 94 % | WEIGHT: 157 LBS | BODY MASS INDEX: 25.23 KG/M2 | DIASTOLIC BLOOD PRESSURE: 68 MMHG | HEART RATE: 74 BPM | SYSTOLIC BLOOD PRESSURE: 112 MMHG | TEMPERATURE: 97.8 F | HEIGHT: 66 IN

## 2024-02-22 DIAGNOSIS — Z13.220 LIPID SCREENING: ICD-10-CM

## 2024-02-22 DIAGNOSIS — T78.40XD ALLERGY, SUBSEQUENT ENCOUNTER: ICD-10-CM

## 2024-02-22 DIAGNOSIS — Z00.00 PREVENTATIVE HEALTH CARE: ICD-10-CM

## 2024-02-22 DIAGNOSIS — E03.9 ACQUIRED HYPOTHYROIDISM: Primary | Chronic | ICD-10-CM

## 2024-02-22 RX ORDER — LORATADINE 10 MG/1
10 TABLET ORAL DAILY
Qty: 30 TABLET | Refills: 0 | Status: SHIPPED | OUTPATIENT
Start: 2024-02-22

## 2024-02-22 RX ORDER — FLUTICASONE PROPIONATE 50 MCG
2 SPRAY, SUSPENSION (ML) NASAL DAILY
Qty: 16 G | Refills: 2 | Status: SHIPPED | OUTPATIENT
Start: 2024-02-22

## 2024-02-22 NOTE — PATIENT INSTRUCTIONS
Blood work today  Claritin/Flonase/Benadryl  Wear mask when outside  Monitor weight closely  Pneumonia booster due in 2 years  Follow-up 6 months   regi

## 2024-02-22 NOTE — PROGRESS NOTES
"Christiano Guzmán is a 88 y.o. male.     History of Present Illness  88-year-old white male with history hypertension, BPH, hypothyroidism, incarcerated hernia and insomnia who comes in today for 6-month follow-up visit and blood work    Blood pressure 112/68 heart rate 74 he denies any chest pain, dyspnea, tachycardia or dizziness    Patient states he was sick couple weeks ago respiratory but now is pretty much feeling back to normal does have some allergy symptoms and runny nose and I ordered him some allergy medication.  He refuses to wear a mask outside    Weight is down 4 pounds at 157 with a BMI 25.7 and I cautioned patient to monitor his weight closely.  He has had 2 COVID vaccines, he has pneumonia booster due in 2026.  He is up-to-date on eye exams and no longer does any other preventative maintenance due to his age            Blood work today  Claritin/Flonase/Benadryl  Wear mask when outside  Monitor weight closely  Pneumonia booster due in 2 years  Follow-up 6 months           The following portions of the patient's history were reviewed and updated as appropriate: allergies, current medications, past family history, past medical history, past social history, past surgical history, and problem list.    Vitals:    02/22/24 1305   BP: 112/68   BP Location: Right arm   Patient Position: Sitting   Cuff Size: Adult   Pulse: 74   Temp: 97.8 °F (36.6 °C)   TempSrc: Infrared   SpO2: 94%   Weight: 71.2 kg (157 lb)   Height: 166.4 cm (65.51\")     Body mass index is 25.72 kg/m².    Past Medical History:   Diagnosis Date    Disease of thyroid gland     Hypothyroidism      Past Surgical History:   Procedure Laterality Date    APPENDECTOMY      CYSTOSCOPY N/A 10/22/2019    Procedure: CYSTOSCOPY FLEXIBLE AT BEDSIDE WITH MCGARRY CATHETER PLACEMENT;  Surgeon: Mynor Amor MD;  Location: [facility] MAIN OR;  Service: Urology    HERNIA REPAIR      multiple repairs to hernia, pt states 7    VENTRAL/INCISIONAL HERNIA " REPAIR N/A 10/22/2019    Procedure: EXPLORATORY LAPAROTOMY, EXTENSIVE LYSIS OF ADHESIONS, EXPLANT OF PREVIOUS  HERNIA MESH, BILATERAL SUBCUTANEOUS SKIN FLAPS, RIGHT SIDED EXTERNAL OBLIQUE RELEASE, VENTRAL  HERNIA REPAIR WITH MESH UNDERLAY AND SMALL BOWEL RESECTION WITH PRIMARY ANASTOMOSIS;  Surgeon: Poncho Nelson MD;  Location: [facility] MAIN OR;  Service: General     Family History   Problem Relation Age of Onset    Cancer Brother     Diabetes Son      Immunization History   Administered Date(s) Administered    COVID-19 (MODERNA) 1st,2nd,3rd Dose Monovalent 02/03/2021, 03/03/2021    COVID-19 (PFIZER) Purple Cap Monovalent 02/04/2021    Fluzone High-Dose 65+yrs 10/13/2022    Pneumococcal Conjugate 20-Valent (PCV20) 07/27/2023    Pneumococcal Polysaccharide (PPSV23) 08/26/2021       Office Visit on 07/27/2023   Component Date Value Ref Range Status    WBC 07/27/2023 7.1  3.4 - 10.8 x10E3/uL Final    RBC 07/27/2023 4.60  4.14 - 5.80 x10E6/uL Final    Hemoglobin 07/27/2023 13.8  13.0 - 17.7 g/dL Final    Hematocrit 07/27/2023 42.0  37.5 - 51.0 % Final    MCV 07/27/2023 91  79 - 97 fL Final    MCH 07/27/2023 30.0  26.6 - 33.0 pg Final    MCHC 07/27/2023 32.9  31.5 - 35.7 g/dL Final    RDW 07/27/2023 13.3  11.6 - 15.4 % Final    Platelets 07/27/2023 201  150 - 450 x10E3/uL Final    Neutrophil Rel % 07/27/2023 40  Not Estab. % Final    Lymphocyte Rel % 07/27/2023 46  Not Estab. % Final    Monocyte Rel % 07/27/2023 12  Not Estab. % Final    Eosinophil Rel % 07/27/2023 1  Not Estab. % Final    Basophil Rel % 07/27/2023 1  Not Estab. % Final    Neutrophils Absolute 07/27/2023 2.8  1.4 - 7.0 x10E3/uL Final    Lymphocytes Absolute 07/27/2023 3.3 (H)  0.7 - 3.1 x10E3/uL Final    Monocytes Absolute 07/27/2023 0.9  0.1 - 0.9 x10E3/uL Final    Eosinophils Absolute 07/27/2023 0.1  0.0 - 0.4 x10E3/uL Final    Basophils Absolute 07/27/2023 0.1  0.0 - 0.2 x10E3/uL Final    Immature Granulocyte Rel % 07/27/2023 0  Not Estab. % Final     Immature Grans Absolute 07/27/2023 0.0  0.0 - 0.1 x10E3/uL Final    Total Cholesterol 07/27/2023 182  100 - 199 mg/dL Final    Triglycerides 07/27/2023 140  0 - 149 mg/dL Final    HDL Cholesterol 07/27/2023 35 (L)  >39 mg/dL Final    VLDL Cholesterol Avi 07/27/2023 25  5 - 40 mg/dL Final    LDL Chol Calc (NIH) 07/27/2023 122 (H)  0 - 99 mg/dL Final    LDL/HDL RATIO 07/27/2023 3.5  0.0 - 3.6 ratio Final    Comment:                                     LDL/HDL Ratio                                              Men  Women                                1/2 Avg.Risk  1.0    1.5                                    Avg.Risk  3.6    3.2                                 2X Avg.Risk  6.2    5.0                                 3X Avg.Risk  8.0    6.1      Glucose 07/27/2023 98  70 - 99 mg/dL Final    BUN 07/27/2023 16  8 - 27 mg/dL Final    Creatinine 07/27/2023 1.08  0.76 - 1.27 mg/dL Final    EGFR Result 07/27/2023 66  >59 mL/min/1.73 Final    BUN/Creatinine Ratio 07/27/2023 15  10 - 24 Final    Sodium 07/27/2023 139  134 - 144 mmol/L Final    Potassium 07/27/2023 4.9  3.5 - 5.2 mmol/L Final    Chloride 07/27/2023 103  96 - 106 mmol/L Final    Total CO2 07/27/2023 23  20 - 29 mmol/L Final    Calcium 07/27/2023 9.0  8.6 - 10.2 mg/dL Final    Total Protein 07/27/2023 7.2  6.0 - 8.5 g/dL Final    Albumin 07/27/2023 4.0  3.7 - 4.7 g/dL Final    Globulin 07/27/2023 3.2  1.5 - 4.5 g/dL Final    A/G Ratio 07/27/2023 1.3  1.2 - 2.2 Final    Total Bilirubin 07/27/2023 0.5  0.0 - 1.2 mg/dL Final    Alkaline Phosphatase 07/27/2023 80  44 - 121 IU/L Final    AST (SGOT) 07/27/2023 39  0 - 40 IU/L Final    ALT (SGPT) 07/27/2023 31  0 - 44 IU/L Final    TSH 07/27/2023 2.800  0.450 - 4.500 uIU/mL Final    Free T4 07/27/2023 1.29  0.82 - 1.77 ng/dL Final    T3, Total 07/27/2023 111  71 - 180 ng/dL Final         Review of Systems   Constitutional: Negative.    HENT:  Positive for postnasal drip and rhinorrhea.    Respiratory: Negative.      Cardiovascular: Negative.    Gastrointestinal: Negative.    Genitourinary: Negative.    Musculoskeletal: Negative.    Skin: Negative.    Neurological: Negative.    Psychiatric/Behavioral: Negative.         Objective   Physical Exam  Constitutional:       Appearance: Normal appearance.   HENT:      Head: Normocephalic.   Cardiovascular:      Rate and Rhythm: Normal rate and regular rhythm.      Pulses: Normal pulses.      Heart sounds: Normal heart sounds.   Pulmonary:      Effort: Pulmonary effort is normal.      Breath sounds: Normal breath sounds.   Abdominal:      General: Bowel sounds are normal.   Musculoskeletal:         General: Normal range of motion.   Skin:     General: Skin is warm.   Neurological:      General: No focal deficit present.      Mental Status: He is alert and oriented to person, place, and time.   Psychiatric:         Mood and Affect: Mood normal.         Behavior: Behavior normal.         Procedures    Assessment & Plan   Diagnoses and all orders for this visit:    1. Acquired hypothyroidism (Primary)  -     TSH+Free T4  -     T3    2. Preventative health care  -     CBC & Differential  -     Comprehensive Metabolic Panel    3. Lipid screening  -     Cancel: Lipid Panel With LDL / HDL Ratio    4. BMI 25.0-25.9,adult    5. Allergy, subsequent encounter    Other orders  -     fluticasone (FLONASE) 50 MCG/ACT nasal spray; 2 sprays into the nostril(s) as directed by provider Daily.  Dispense: 16 g; Refill: 2  -     loratadine (EQ Allergy Relief) 10 MG tablet; Take 1 tablet by mouth Daily.  Dispense: 30 tablet; Refill: 0           Current Outpatient Medications:     aspirin 81 MG chewable tablet, Chew 1 tablet Daily., Disp: , Rfl:     hydrOXYzine (ATARAX) 25 MG tablet, TAKE 1 TO 2 TABLETS BY MOUTH ONCE DAILY 30  MINUTES  BEFORE  BEDTIME, Disp: 14 tablet, Rfl: 0    levothyroxine (SYNTHROID, LEVOTHROID) 112 MCG tablet, Take 1 tablet by mouth once daily, Disp: 30 tablet, Rfl: 0    loratadine (EQ  Allergy Relief) 10 MG tablet, Take 1 tablet by mouth Daily., Disp: 30 tablet, Rfl: 0    fluticasone (FLONASE) 50 MCG/ACT nasal spray, 2 sprays into the nostril(s) as directed by provider Daily., Disp: 16 g, Rfl: 2           Radha Greene, CHRISTIAN 2/22/2024 13:58 EST  This note has been electronically signed

## 2024-02-23 LAB
ALBUMIN SERPL-MCNC: 4 G/DL (ref 3.7–4.7)
ALBUMIN/GLOB SERPL: 1.6 {RATIO} (ref 1.2–2.2)
ALP SERPL-CCNC: 75 IU/L (ref 44–121)
ALT SERPL-CCNC: 9 IU/L (ref 0–44)
AST SERPL-CCNC: 19 IU/L (ref 0–40)
BASOPHILS # BLD AUTO: 0.1 X10E3/UL (ref 0–0.2)
BASOPHILS NFR BLD AUTO: 1 %
BILIRUB SERPL-MCNC: 0.4 MG/DL (ref 0–1.2)
BUN SERPL-MCNC: 15 MG/DL (ref 8–27)
BUN/CREAT SERPL: 13 (ref 10–24)
CALCIUM SERPL-MCNC: 9 MG/DL (ref 8.6–10.2)
CHLORIDE SERPL-SCNC: 102 MMOL/L (ref 96–106)
CO2 SERPL-SCNC: 25 MMOL/L (ref 20–29)
CREAT SERPL-MCNC: 1.13 MG/DL (ref 0.76–1.27)
EGFRCR SERPLBLD CKD-EPI 2021: 63 ML/MIN/1.73
EOSINOPHIL # BLD AUTO: 0.3 X10E3/UL (ref 0–0.4)
EOSINOPHIL NFR BLD AUTO: 3 %
ERYTHROCYTE [DISTWIDTH] IN BLOOD BY AUTOMATED COUNT: 13 % (ref 11.6–15.4)
GLOBULIN SER CALC-MCNC: 2.5 G/DL (ref 1.5–4.5)
GLUCOSE SERPL-MCNC: 98 MG/DL (ref 70–99)
HCT VFR BLD AUTO: 39.5 % (ref 37.5–51)
HGB BLD-MCNC: 13.2 G/DL (ref 13–17.7)
IMM GRANULOCYTES # BLD AUTO: 0 X10E3/UL (ref 0–0.1)
IMM GRANULOCYTES NFR BLD AUTO: 0 %
LYMPHOCYTES # BLD AUTO: 2.3 X10E3/UL (ref 0.7–3.1)
LYMPHOCYTES NFR BLD AUTO: 24 %
MCH RBC QN AUTO: 30.8 PG (ref 26.6–33)
MCHC RBC AUTO-ENTMCNC: 33.4 G/DL (ref 31.5–35.7)
MCV RBC AUTO: 92 FL (ref 79–97)
MONOCYTES # BLD AUTO: 0.7 X10E3/UL (ref 0.1–0.9)
MONOCYTES NFR BLD AUTO: 7 %
NEUTROPHILS # BLD AUTO: 6.3 X10E3/UL (ref 1.4–7)
NEUTROPHILS NFR BLD AUTO: 65 %
PLATELET # BLD AUTO: 249 X10E3/UL (ref 150–450)
POTASSIUM SERPL-SCNC: 4.4 MMOL/L (ref 3.5–5.2)
PROT SERPL-MCNC: 6.5 G/DL (ref 6–8.5)
RBC # BLD AUTO: 4.28 X10E6/UL (ref 4.14–5.8)
SODIUM SERPL-SCNC: 139 MMOL/L (ref 134–144)
T3 SERPL-MCNC: 77 NG/DL (ref 71–180)
T4 FREE SERPL-MCNC: 0.9 NG/DL (ref 0.82–1.77)
TSH SERPL DL<=0.005 MIU/L-ACNC: 5.93 UIU/ML (ref 0.45–4.5)
WBC # BLD AUTO: 9.6 X10E3/UL (ref 3.4–10.8)

## 2024-03-13 RX ORDER — LEVOTHYROXINE SODIUM 112 UG/1
112 TABLET ORAL DAILY
Qty: 30 TABLET | Refills: 0 | Status: SHIPPED | OUTPATIENT
Start: 2024-03-13

## 2024-03-25 RX ORDER — LORATADINE 10 MG/1
10 TABLET ORAL DAILY
Qty: 30 TABLET | Refills: 0 | Status: SHIPPED | OUTPATIENT
Start: 2024-03-25

## 2024-04-15 RX ORDER — LEVOTHYROXINE SODIUM 112 UG/1
112 TABLET ORAL DAILY
Qty: 30 TABLET | Refills: 0 | Status: SHIPPED | OUTPATIENT
Start: 2024-04-15

## 2024-04-15 NOTE — TELEPHONE ENCOUNTER
Caller: Mayra Brock    Relationship: Emergency Contact    Best call back number: [phone]    Requested Prescriptions:   Requested Prescriptions     Pending Prescriptions Disp Refills    levothyroxine (SYNTHROID, LEVOTHROID) 112 MCG tablet 30 tablet 0     Sig: Take 1 tablet by mouth Daily.        Pharmacy where request should be sent: [facility] [address] [place] [address] [phone] [place] [phone]      Last office visit with prescribing clinician: 2/22/2024   Last telemedicine visit with prescribing clinician: Visit date not found   Next office visit with prescribing clinician: 8/29/2024     Additional details provided by patient: STATED THAT THEY ARE COMPLETELY OUT OF THE MEDICATION    Does the patient have less than a 3 day supply:  [x] Yes  [] No    Would you like a call back once the refill request has been completed: [] Yes [x] No    If the office needs to give you a call back, can they leave a voicemail: [] Yes [x] No    Ale Monge   04/15/24 08:20 EDT

## 2024-04-27 RX ORDER — LORATADINE 10 MG/1
10 TABLET ORAL DAILY
Qty: 30 TABLET | Refills: 0 | Status: SHIPPED | OUTPATIENT
Start: 2024-04-27

## 2024-05-19 RX ORDER — LEVOTHYROXINE SODIUM 112 UG/1
112 TABLET ORAL DAILY
Qty: 90 TABLET | Refills: 0 | Status: SHIPPED | OUTPATIENT
Start: 2024-05-19

## 2024-05-28 PROCEDURE — 99285 EMERGENCY DEPT VISIT HI MDM: CPT

## 2024-05-28 RX ORDER — SODIUM CHLORIDE 0.9 % (FLUSH) 0.9 %
10 SYRINGE (ML) INJECTION AS NEEDED
Status: DISCONTINUED | OUTPATIENT
Start: 2024-05-28 | End: 2024-05-31 | Stop reason: HOSPADM

## 2024-05-29 ENCOUNTER — HOSPITAL ENCOUNTER (INPATIENT)
Facility: HOSPITAL | Age: 89
LOS: 1 days | Discharge: HOME | DRG: 373 | End: 2024-05-31
Attending: EMERGENCY MEDICINE | Admitting: INTERNAL MEDICINE
Payer: MEDICARE

## 2024-05-29 ENCOUNTER — APPOINTMENT (OUTPATIENT)
Dept: CT IMAGING | Facility: HOSPITAL | Age: 89
DRG: 373 | End: 2024-05-29
Payer: MEDICARE

## 2024-05-29 DIAGNOSIS — K52.9 COLITIS: Primary | ICD-10-CM

## 2024-05-29 PROBLEM — R19.7 DIARRHEA: Status: ACTIVE | Noted: 2024-05-29

## 2024-05-29 LAB
ALBUMIN SERPL-MCNC: 4 G/DL (ref 3.5–5.2)
ALBUMIN/GLOB SERPL: 1 G/DL
ALP SERPL-CCNC: 86 U/L (ref 39–117)
ALT SERPL W P-5'-P-CCNC: 9 U/L (ref 1–41)
ANION GAP SERPL CALCULATED.3IONS-SCNC: 12.5 MMOL/L (ref 5–15)
ANION GAP SERPL CALCULATED.3IONS-SCNC: 9.7 MMOL/L (ref 5–15)
AST SERPL-CCNC: 19 U/L (ref 1–40)
BACTERIA UR QL AUTO: ABNORMAL /HPF
BASOPHILS # BLD AUTO: 0.1 10*3/MM3 (ref 0–0.2)
BASOPHILS # BLD AUTO: 0.1 10*3/MM3 (ref 0–0.2)
BASOPHILS NFR BLD AUTO: 0.5 % (ref 0–1.5)
BASOPHILS NFR BLD AUTO: 0.6 % (ref 0–1.5)
BILIRUB SERPL-MCNC: 0.6 MG/DL (ref 0–1.2)
BILIRUB UR QL STRIP: NEGATIVE
BUN SERPL-MCNC: 15 MG/DL (ref 8–23)
BUN SERPL-MCNC: 17 MG/DL (ref 8–23)
BUN/CREAT SERPL: 14.8 (ref 7–25)
BUN/CREAT SERPL: 17.4 (ref 7–25)
CALCIUM SPEC-SCNC: 8.2 MG/DL (ref 8.6–10.5)
CALCIUM SPEC-SCNC: 9.3 MG/DL (ref 8.6–10.5)
CHLORIDE SERPL-SCNC: 103 MMOL/L (ref 98–107)
CHLORIDE SERPL-SCNC: 98 MMOL/L (ref 98–107)
CLARITY UR: ABNORMAL
CO2 SERPL-SCNC: 21.3 MMOL/L (ref 22–29)
CO2 SERPL-SCNC: 24.5 MMOL/L (ref 22–29)
COLOR UR: ABNORMAL
CREAT SERPL-MCNC: 0.86 MG/DL (ref 0.76–1.27)
CREAT SERPL-MCNC: 1.15 MG/DL (ref 0.76–1.27)
DEPRECATED RDW RBC AUTO: 46.3 FL (ref 37–54)
DEPRECATED RDW RBC AUTO: 47 FL (ref 37–54)
EGFRCR SERPLBLD CKD-EPI 2021: 61.2 ML/MIN/1.73
EGFRCR SERPLBLD CKD-EPI 2021: 83.3 ML/MIN/1.73
EOSINOPHIL # BLD AUTO: 0.05 10*3/MM3 (ref 0–0.4)
EOSINOPHIL # BLD AUTO: 0.05 10*3/MM3 (ref 0–0.4)
EOSINOPHIL NFR BLD AUTO: 0.3 % (ref 0.3–6.2)
EOSINOPHIL NFR BLD AUTO: 0.3 % (ref 0.3–6.2)
ERYTHROCYTE [DISTWIDTH] IN BLOOD BY AUTOMATED COUNT: 13.4 % (ref 12.3–15.4)
ERYTHROCYTE [DISTWIDTH] IN BLOOD BY AUTOMATED COUNT: 13.4 % (ref 12.3–15.4)
FLUAV RNA RESP QL NAA+PROBE: NOT DETECTED
FLUBV RNA RESP QL NAA+PROBE: NOT DETECTED
GLOBULIN UR ELPH-MCNC: 3.9 GM/DL
GLUCOSE SERPL-MCNC: 119 MG/DL (ref 65–99)
GLUCOSE SERPL-MCNC: 95 MG/DL (ref 65–99)
GLUCOSE UR STRIP-MCNC: NEGATIVE MG/DL
HCT VFR BLD AUTO: 36.3 % (ref 37.5–51)
HCT VFR BLD AUTO: 42.6 % (ref 37.5–51)
HGB BLD-MCNC: 11.5 G/DL (ref 13–17.7)
HGB BLD-MCNC: 13.5 G/DL (ref 13–17.7)
HGB UR QL STRIP.AUTO: NEGATIVE
HYALINE CASTS UR QL AUTO: ABNORMAL /LPF
IMM GRANULOCYTES # BLD AUTO: 0.11 10*3/MM3 (ref 0–0.05)
IMM GRANULOCYTES # BLD AUTO: 0.11 10*3/MM3 (ref 0–0.05)
IMM GRANULOCYTES NFR BLD AUTO: 0.6 % (ref 0–0.5)
IMM GRANULOCYTES NFR BLD AUTO: 0.7 % (ref 0–0.5)
KETONES UR QL STRIP: NEGATIVE
LEUKOCYTE ESTERASE UR QL STRIP.AUTO: ABNORMAL
LIPASE SERPL-CCNC: 20 U/L (ref 13–60)
LYMPHOCYTES # BLD AUTO: 2.03 10*3/MM3 (ref 0.7–3.1)
LYMPHOCYTES # BLD AUTO: 2.25 10*3/MM3 (ref 0.7–3.1)
LYMPHOCYTES NFR BLD AUTO: 12.1 % (ref 19.6–45.3)
LYMPHOCYTES NFR BLD AUTO: 12.7 % (ref 19.6–45.3)
MCH RBC QN AUTO: 30 PG (ref 26.6–33)
MCH RBC QN AUTO: 30.1 PG (ref 26.6–33)
MCHC RBC AUTO-ENTMCNC: 31.7 G/DL (ref 31.5–35.7)
MCHC RBC AUTO-ENTMCNC: 31.7 G/DL (ref 31.5–35.7)
MCV RBC AUTO: 94.8 FL (ref 79–97)
MCV RBC AUTO: 94.9 FL (ref 79–97)
MONOCYTES # BLD AUTO: 1.37 10*3/MM3 (ref 0.1–0.9)
MONOCYTES # BLD AUTO: 1.44 10*3/MM3 (ref 0.1–0.9)
MONOCYTES NFR BLD AUTO: 7.7 % (ref 5–12)
MONOCYTES NFR BLD AUTO: 8.6 % (ref 5–12)
NEUTROPHILS NFR BLD AUTO: 12.36 10*3/MM3 (ref 1.7–7)
NEUTROPHILS NFR BLD AUTO: 14.65 10*3/MM3 (ref 1.7–7)
NEUTROPHILS NFR BLD AUTO: 77.1 % (ref 42.7–76)
NEUTROPHILS NFR BLD AUTO: 78.8 % (ref 42.7–76)
NITRITE UR QL STRIP: NEGATIVE
NRBC BLD AUTO-RTO: 0 /100 WBC (ref 0–0.2)
NRBC BLD AUTO-RTO: 0 /100 WBC (ref 0–0.2)
PH UR STRIP.AUTO: 5.5 [PH] (ref 5–8)
PLATELET # BLD AUTO: 261 10*3/MM3 (ref 140–450)
PLATELET # BLD AUTO: 313 10*3/MM3 (ref 140–450)
PMV BLD AUTO: 10.9 FL (ref 6–12)
PMV BLD AUTO: 10.9 FL (ref 6–12)
POTASSIUM SERPL-SCNC: 3.6 MMOL/L (ref 3.5–5.2)
POTASSIUM SERPL-SCNC: 3.8 MMOL/L (ref 3.5–5.2)
PROCALCITONIN SERPL-MCNC: 0.08 NG/ML (ref 0–0.25)
PROT SERPL-MCNC: 7.9 G/DL (ref 6–8.5)
PROT UR QL STRIP: ABNORMAL
RBC # BLD AUTO: 3.83 10*6/MM3 (ref 4.14–5.8)
RBC # BLD AUTO: 4.49 10*6/MM3 (ref 4.14–5.8)
RBC # UR STRIP: ABNORMAL /HPF
REF LAB TEST METHOD: ABNORMAL
RSV RNA RESP QL NAA+PROBE: NOT DETECTED
SARS-COV-2 RNA RESP QL NAA+PROBE: NOT DETECTED
SODIUM SERPL-SCNC: 134 MMOL/L (ref 136–145)
SODIUM SERPL-SCNC: 135 MMOL/L (ref 136–145)
SP GR UR STRIP: 1.05 (ref 1–1.03)
SQUAMOUS #/AREA URNS HPF: ABNORMAL /HPF
UROBILINOGEN UR QL STRIP: ABNORMAL
WBC # UR STRIP: ABNORMAL /HPF
WBC NRBC COR # BLD AUTO: 16.02 10*3/MM3 (ref 3.4–10.8)
WBC NRBC COR # BLD AUTO: 18.6 10*3/MM3 (ref 3.4–10.8)

## 2024-05-29 PROCEDURE — 25810000003 SODIUM CHLORIDE 0.9 % SOLUTION: Performed by: STUDENT IN AN ORGANIZED HEALTH CARE EDUCATION/TRAINING PROGRAM

## 2024-05-29 PROCEDURE — 85025 COMPLETE CBC W/AUTO DIFF WBC: CPT | Performed by: PHYSICIAN ASSISTANT

## 2024-05-29 PROCEDURE — G0378 HOSPITAL OBSERVATION PER HR: HCPCS

## 2024-05-29 PROCEDURE — 25810000003 SODIUM CHLORIDE 0.9 % SOLUTION

## 2024-05-29 PROCEDURE — 74177 CT ABD & PELVIS W/CONTRAST: CPT

## 2024-05-29 PROCEDURE — 83690 ASSAY OF LIPASE: CPT | Performed by: PHYSICIAN ASSISTANT

## 2024-05-29 PROCEDURE — 25510000001 IOPAMIDOL PER 1 ML: Performed by: EMERGENCY MEDICINE

## 2024-05-29 PROCEDURE — 81001 URINALYSIS AUTO W/SCOPE: CPT | Performed by: PHYSICIAN ASSISTANT

## 2024-05-29 PROCEDURE — 87077 CULTURE AEROBIC IDENTIFY: CPT | Performed by: INTERNAL MEDICINE

## 2024-05-29 PROCEDURE — 80053 COMPREHEN METABOLIC PANEL: CPT | Performed by: PHYSICIAN ASSISTANT

## 2024-05-29 PROCEDURE — 84145 PROCALCITONIN (PCT): CPT

## 2024-05-29 PROCEDURE — 25010000002 CEFTRIAXONE PER 250 MG: Performed by: STUDENT IN AN ORGANIZED HEALTH CARE EDUCATION/TRAINING PROGRAM

## 2024-05-29 PROCEDURE — 25010000002 ONDANSETRON PER 1 MG: Performed by: EMERGENCY MEDICINE

## 2024-05-29 PROCEDURE — 85025 COMPLETE CBC W/AUTO DIFF WBC: CPT

## 2024-05-29 PROCEDURE — 87637 SARSCOV2&INF A&B&RSV AMP PRB: CPT | Performed by: STUDENT IN AN ORGANIZED HEALTH CARE EDUCATION/TRAINING PROGRAM

## 2024-05-29 PROCEDURE — 25010000002 METRONIDAZOLE 500 MG/100ML SOLUTION: Performed by: STUDENT IN AN ORGANIZED HEALTH CARE EDUCATION/TRAINING PROGRAM

## 2024-05-29 PROCEDURE — 87086 URINE CULTURE/COLONY COUNT: CPT | Performed by: INTERNAL MEDICINE

## 2024-05-29 PROCEDURE — 25810000003 SODIUM CHLORIDE 0.9 % SOLUTION: Performed by: EMERGENCY MEDICINE

## 2024-05-29 RX ORDER — SODIUM CHLORIDE 0.9 % (FLUSH) 0.9 %
10 SYRINGE (ML) INJECTION EVERY 12 HOURS SCHEDULED
Status: DISCONTINUED | OUTPATIENT
Start: 2024-05-29 | End: 2024-05-31 | Stop reason: HOSPADM

## 2024-05-29 RX ORDER — SODIUM CHLORIDE 9 MG/ML
125 INJECTION, SOLUTION INTRAVENOUS CONTINUOUS
Status: DISCONTINUED | OUTPATIENT
Start: 2024-05-29 | End: 2024-05-31 | Stop reason: HOSPADM

## 2024-05-29 RX ORDER — ACETAMINOPHEN 160 MG/5ML
650 SOLUTION ORAL EVERY 4 HOURS PRN
Status: DISCONTINUED | OUTPATIENT
Start: 2024-05-29 | End: 2024-05-31 | Stop reason: HOSPADM

## 2024-05-29 RX ORDER — ONDANSETRON 2 MG/ML
4 INJECTION INTRAMUSCULAR; INTRAVENOUS EVERY 6 HOURS PRN
Status: DISCONTINUED | OUTPATIENT
Start: 2024-05-29 | End: 2024-05-31 | Stop reason: HOSPADM

## 2024-05-29 RX ORDER — ACETAMINOPHEN 325 MG/1
650 TABLET ORAL EVERY 4 HOURS PRN
Status: DISCONTINUED | OUTPATIENT
Start: 2024-05-29 | End: 2024-05-31 | Stop reason: HOSPADM

## 2024-05-29 RX ORDER — LEVOTHYROXINE SODIUM 112 UG/1
112 TABLET ORAL
Status: DISCONTINUED | OUTPATIENT
Start: 2024-05-29 | End: 2024-05-31 | Stop reason: HOSPADM

## 2024-05-29 RX ORDER — CETIRIZINE HYDROCHLORIDE 10 MG/1
10 TABLET ORAL DAILY
Status: DISCONTINUED | OUTPATIENT
Start: 2024-05-29 | End: 2024-05-31 | Stop reason: HOSPADM

## 2024-05-29 RX ORDER — ASPIRIN 81 MG/1
81 TABLET, CHEWABLE ORAL DAILY
Status: DISCONTINUED | OUTPATIENT
Start: 2024-05-29 | End: 2024-05-31 | Stop reason: HOSPADM

## 2024-05-29 RX ORDER — SODIUM CHLORIDE 0.9 % (FLUSH) 0.9 %
10 SYRINGE (ML) INJECTION AS NEEDED
Status: DISCONTINUED | OUTPATIENT
Start: 2024-05-29 | End: 2024-05-31 | Stop reason: HOSPADM

## 2024-05-29 RX ORDER — SODIUM CHLORIDE 9 MG/ML
100 INJECTION, SOLUTION INTRAVENOUS CONTINUOUS
Status: DISCONTINUED | OUTPATIENT
Start: 2024-05-29 | End: 2024-05-29

## 2024-05-29 RX ORDER — BISACODYL 10 MG
10 SUPPOSITORY, RECTAL RECTAL DAILY PRN
Status: DISCONTINUED | OUTPATIENT
Start: 2024-05-29 | End: 2024-05-31 | Stop reason: HOSPADM

## 2024-05-29 RX ORDER — ONDANSETRON 2 MG/ML
4 INJECTION INTRAMUSCULAR; INTRAVENOUS ONCE
Status: COMPLETED | OUTPATIENT
Start: 2024-05-29 | End: 2024-05-29

## 2024-05-29 RX ORDER — SODIUM CHLORIDE 9 MG/ML
40 INJECTION, SOLUTION INTRAVENOUS AS NEEDED
Status: DISCONTINUED | OUTPATIENT
Start: 2024-05-29 | End: 2024-05-31 | Stop reason: HOSPADM

## 2024-05-29 RX ORDER — FAMOTIDINE 10 MG/ML
20 INJECTION, SOLUTION INTRAVENOUS ONCE
Status: COMPLETED | OUTPATIENT
Start: 2024-05-29 | End: 2024-05-29

## 2024-05-29 RX ORDER — METRONIDAZOLE 500 MG/100ML
500 INJECTION, SOLUTION INTRAVENOUS EVERY 8 HOURS
Qty: 2100 ML | Refills: 0 | Status: DISCONTINUED | OUTPATIENT
Start: 2024-05-29 | End: 2024-05-30

## 2024-05-29 RX ORDER — BISACODYL 5 MG/1
5 TABLET, DELAYED RELEASE ORAL DAILY PRN
Status: DISCONTINUED | OUTPATIENT
Start: 2024-05-29 | End: 2024-05-31 | Stop reason: HOSPADM

## 2024-05-29 RX ORDER — AMOXICILLIN 250 MG
2 CAPSULE ORAL 2 TIMES DAILY PRN
Status: DISCONTINUED | OUTPATIENT
Start: 2024-05-29 | End: 2024-05-31 | Stop reason: HOSPADM

## 2024-05-29 RX ORDER — POLYETHYLENE GLYCOL 3350 17 G/17G
17 POWDER, FOR SOLUTION ORAL DAILY PRN
Status: DISCONTINUED | OUTPATIENT
Start: 2024-05-29 | End: 2024-05-31 | Stop reason: HOSPADM

## 2024-05-29 RX ORDER — ACETAMINOPHEN 650 MG/1
650 SUPPOSITORY RECTAL EVERY 4 HOURS PRN
Status: DISCONTINUED | OUTPATIENT
Start: 2024-05-29 | End: 2024-05-31 | Stop reason: HOSPADM

## 2024-05-29 RX ORDER — UREA 10 %
5 LOTION (ML) TOPICAL NIGHTLY PRN
Status: DISCONTINUED | OUTPATIENT
Start: 2024-05-29 | End: 2024-05-31 | Stop reason: HOSPADM

## 2024-05-29 RX ADMIN — CETIRIZINE HYDROCHLORIDE 10 MG: 10 TABLET, FILM COATED ORAL at 12:25

## 2024-05-29 RX ADMIN — CEFTRIAXONE SODIUM 2000 MG: 2 INJECTION, POWDER, FOR SOLUTION INTRAMUSCULAR; INTRAVENOUS at 12:23

## 2024-05-29 RX ADMIN — Medication 10 ML: at 09:38

## 2024-05-29 RX ADMIN — ASPIRIN 81 MG CHEWABLE TABLET 81 MG: 81 TABLET CHEWABLE at 12:25

## 2024-05-29 RX ADMIN — FAMOTIDINE 20 MG: 10 INJECTION INTRAVENOUS at 01:38

## 2024-05-29 RX ADMIN — IOPAMIDOL 100 ML: 755 INJECTION, SOLUTION INTRAVENOUS at 03:38

## 2024-05-29 RX ADMIN — METRONIDAZOLE 500 MG: 500 INJECTION, SOLUTION INTRAVENOUS at 12:41

## 2024-05-29 RX ADMIN — LEVOTHYROXINE SODIUM 112 MCG: 0.11 TABLET ORAL at 12:25

## 2024-05-29 RX ADMIN — Medication 10 ML: at 21:40

## 2024-05-29 RX ADMIN — SODIUM CHLORIDE 1000 ML: 9 INJECTION, SOLUTION INTRAVENOUS at 01:36

## 2024-05-29 RX ADMIN — ONDANSETRON 4 MG: 2 INJECTION INTRAMUSCULAR; INTRAVENOUS at 01:37

## 2024-05-29 RX ADMIN — SODIUM CHLORIDE 100 ML/HR: 9 INJECTION, SOLUTION INTRAVENOUS at 06:18

## 2024-05-29 RX ADMIN — SODIUM CHLORIDE 75 ML/HR: 9 INJECTION, SOLUTION INTRAVENOUS at 12:16

## 2024-05-29 RX ADMIN — METRONIDAZOLE 500 MG: 500 INJECTION, SOLUTION INTRAVENOUS at 22:05

## 2024-05-29 NOTE — ED PROVIDER NOTES
Subjective   Provider in Triage Note  Patient is an 89-year-old male presents to the ED with reports of right-sided abdominal pain for the past several days.  He reports decrease in appetite.  No reports of nausea or vomiting.  Does report some diarrhea.  No black or bloody stool.  No urinary complaints.  Pertinent abdominal surgical history includes hernia repair and appendectomy    Due to significant overcrowding in the emergency department patient was initially seen and evaluated in triage.  Provider in triage recommended patient placed in treatment area to initiate therapy and movement to an ER bed as soon as possible.        History of Present Illness  89-year-old male with mild upper and right-sided abdominal pain for the past several days.  Patient states he was constipated and took some mineral oil and then had subsequent diarrhea which has persisted with some associated nausea and vomiting and fatigue this evening.        Review of Systems   Gastrointestinal:  Positive for abdominal pain, constipation, diarrhea, nausea and vomiting.       Past Medical History:   Diagnosis Date    Disease of thyroid gland     Hypothyroidism        No Known Allergies    Past Surgical History:   Procedure Laterality Date    APPENDECTOMY      CYSTOSCOPY N/A 10/22/2019    Procedure: CYSTOSCOPY FLEXIBLE AT BEDSIDE WITH MCGARRY CATHETER PLACEMENT;  Surgeon: Mynor Amor MD;  Location: [facility];  Service: Urology    HERNIA REPAIR      multiple repairs to hernia, pt states 7    VENTRAL/INCISIONAL HERNIA REPAIR N/A 10/22/2019    Procedure: EXPLORATORY LAPAROTOMY, EXTENSIVE LYSIS OF ADHESIONS, EXPLANT OF PREVIOUS  HERNIA MESH, BILATERAL SUBCUTANEOUS SKIN FLAPS, RIGHT SIDED EXTERNAL OBLIQUE RELEASE, VENTRAL  HERNIA REPAIR WITH MESH UNDERLAY AND SMALL BOWEL RESECTION WITH PRIMARY ANASTOMOSIS;  Surgeon: Poncho Nelson MD;  Location: [facility] OR;  Service: General       Family History   Problem Relation Age of Onset     Cancer Brother     Diabetes Son        Social History     Socioeconomic History    Marital status:    Tobacco Use    Smoking status: Former     Current packs/day: 0.00     Average packs/day: 0.5 packs/day for 20.0 years (10.0 ttl pk-yrs)     Types: Cigarettes     Start date:      Quit date:      Years since quittin.4     Passive exposure: Past    Smokeless tobacco: Former     Types: Chew     Quit date:    Vaping Use    Vaping status: Never Used   Substance and Sexual Activity    Alcohol use: No    Drug use: No    Sexual activity: Not Currently           Objective   Physical Exam  Constitutional:       Appearance: He is well-developed.   HENT:      Head: Normocephalic and atraumatic.      Mouth/Throat:      Mouth: Mucous membranes are moist.      Pharynx: Oropharynx is clear.   Cardiovascular:      Rate and Rhythm: Normal rate and regular rhythm.      Heart sounds: Normal heart sounds.   Pulmonary:      Effort: Pulmonary effort is normal.      Breath sounds: Normal breath sounds.   Abdominal:      General: Bowel sounds are normal.      Palpations: Abdomen is soft.      Comments: Reducible ventral hernia, nontender, normal bowel sounds   Musculoskeletal:         General: Normal range of motion.   Skin:     General: Skin is warm and dry.      Capillary Refill: Capillary refill takes less than 2 seconds.   Neurological:      General: No focal deficit present.      Mental Status: He is alert and oriented to person, place, and time.   Psychiatric:         Mood and Affect: Mood normal.         Behavior: Behavior normal.         Procedures           ED Course                                             Medical Decision Making  Colitis on CT without any abdominal pain currently, patient appears well.  Given advanced age and associated leukocytosis, will observe in the hospital.  I have seen numerous patients with diarrheal illness in the past several days favoring a possible viral outbreak.  Will  follow stool cultures, defer any antibiotics to hospitalist.  Case discussed with Candy with hospital service, agrees with plan.    Problems Addressed:  Colitis: complicated acute illness or injury    Amount and/or Complexity of Data Reviewed  Labs: ordered.  Radiology: ordered.    Risk  Prescription drug management.  Decision regarding hospitalization.        Final diagnoses:   Colitis       ED Disposition  ED Disposition       ED Disposition   Decision to Admit    Condition   --    Comment   Level of Care: Telemetry [5]   Admitting Physician: NHUNG MADRID [[id number]]                 No follow-up provider specified.       Medication List      No changes were made to your prescriptions during this visit.            Nhung Zheng MD  05/29/24 0411

## 2024-05-29 NOTE — CASE MANAGEMENT/SOCIAL WORK
Discharge Planning Assessment   Pedro Luis     Patient Name: Christiano Guzmán  MRN: [id number]  Today's Date: 5/29/2024    Admit Date: 5/29/2024    Plan: Routine home.   Discharge Needs Assessment       Row Name 05/29/24 0941       Living Environment    People in Home alone    Current Living Arrangements home    Potentially Unsafe Housing Conditions none    In the past 12 months has the electric, gas, oil, or water company threatened to shut off services in your home? No    Primary Care Provided by self    Provides Primary Care For no one    Family Caregiver if Needed child(navin), adult    Family Caregiver Names 4 adult children - Son Farhat is currently staying with the pt    Quality of Family Relationships helpful;involved;supportive    Able to Return to Prior Arrangements yes       Resource/Environmental Concerns    Resource/Environmental Concerns none    Transportation Concerns none       Transportation Needs    In the past 12 months, has lack of transportation kept you from medical appointments or from getting medications? no    In the past 12 months, has lack of transportation kept you from meetings, work, or from getting things needed for daily living? No       Food Insecurity    Within the past 12 months, you worried that your food would run out before you got the money to buy more. Never true    Within the past 12 months, the food you bought just didn't last and you didn't have money to get more. Never true       Transition Planning    Patient/Family Anticipates Transition to home    Patient/Family Anticipated Services at Transition none    Transportation Anticipated car, drives self;family or friend will provide       Discharge Needs Assessment    Readmission Within the Last 30 Days no previous admission in last 30 days    Equipment Currently Used at Home none    Concerns to be Addressed no discharge needs identified;denies needs/concerns at this time    Anticipated Changes Related to Illness none     Equipment Needed After Discharge none    Provided Post Acute Provider List? N/A    Provided Post Acute Provider Quality & Resource List? N/A                   Discharge Plan       Row Name 05/29/24 0942       Plan    Plan Routine home.    Patient/Family in Agreement with Plan yes    Provided Post Acute Provider List? N/A    Provided Post Acute Provider Quality & Resource List? N/A    Plan Comments CM met with patient at the bedside. Confirmed PCP, insurance, and pharmacy. Patient declines M2B. Patient denies any difficulty affording food, utilities, or medications. Patient is not current with any HHC/OPPT/OT services. [place] caring for many animals, is IADLs and drives himself. Pt's son is currently staying with the pt while ill and has 3 other children that help to care for him as well. Farhat to transport patient at d/c.                  Continued Care and Services - Admitted Since 5/29/2024    No active coordination exists for this encounter.          Demographic Summary       Row Name 05/29/24 0935       General Information    Admission Type observation    Arrived From emergency department    Referral Source admission list    Reason for Consult discharge planning    Preferred Language English       Contact Information    Permission Granted to Share Info With     Contact Information Obtained for                    Functional Status       Row Name 05/29/24 0962       Functional Status    Usual Activity Tolerance moderate    Current Activity Tolerance moderate       Functional Status, IADL    Medications independent    Meal Preparation independent    Housekeeping independent    Laundry independent    Shopping independent       Mental Status    General Appearance WDL WDL       Mental Status Summary    Recent Changes in Mental Status/Cognitive Functioning no changes                Patient Forms       Row Name 05/29/24 0861       Patient Forms    Important Message from  Medicare ([facility]) Delivered  MOON per reg 5.29               Xiomara Solorio RN    [phone]  [email or web address]

## 2024-05-29 NOTE — H&P
"[facility] Medicine Services  History & Physical    Patient Name: Christiano Guzmán  : 1935  MRN: [id number]  Primary Care Physician:  Radha Greene APRN  Date of admission: 2024  Date and Time of Service: 2024 at 0830    Subjective      Chief Complaint: \"abdominal pain, diarrhea\"    History of Present Illness: Christiano Guzmán is a 89 y.o. male with a PMH of Incarcerated hernia S/P hernia repair,  HTN, BPH, Hypothyroidism, who presented to [facility] on 2024 with diffuse abdominal pain and several episodes of watery diarrhea since the past 4 days.  Patient reports his pain as diffuse but slightly worse in RLQ. Denies eating leftovers, recent travels, fever, chills, cough, SOB, sore throat, rashes, sick contacts, melena, hematemesis. Workup in ER CT A/P showed findings of pancolitis, there is a large ventral hernia, no reported SBO. Workup in ER, chemistry labs notable for mild hyponatremia Na 134, K 3.6, CBC labs notable for leukocytosis WBC 18.6. Patient states his abdominal pain and diarrhea have significantly improved since he presented to ER. The decision to admit was made.       Review of Systems   Constitutional:  Positive for fatigue. Negative for activity change, chills, fever and unexpected weight change.   HENT:  Negative for dental problem, facial swelling, mouth sores, rhinorrhea, sinus pain and sore throat.    Eyes:  Negative for redness and itching.   Respiratory:  Negative for cough, chest tightness and stridor.    Cardiovascular:  Negative for chest pain.   Gastrointestinal:  Positive for abdominal pain, constipation, diarrhea and nausea. Negative for abdominal distention, anal bleeding, blood in stool and rectal pain.   Endocrine: Negative for heat intolerance.   Genitourinary:  Negative for dysuria, flank pain, hematuria, penile swelling and testicular pain.   Neurological:  Negative for syncope, facial asymmetry and numbness.   Hematological:  " Negative for adenopathy.   Psychiatric/Behavioral:  Negative for behavioral problems, decreased concentration and sleep disturbance. The patient is not nervous/anxious.        Personal History     Past Medical History:   Diagnosis Date    Disease of thyroid gland     Hypothyroidism        Past Surgical History:   Procedure Laterality Date    APPENDECTOMY      CYSTOSCOPY N/A 10/22/2019    Procedure: CYSTOSCOPY FLEXIBLE AT BEDSIDE WITH MCGARRY CATHETER PLACEMENT;  Surgeon: Mynor Amor MD;  Location: [facility];  Service: Urology    HERNIA REPAIR      multiple repairs to hernia, pt states 7    VENTRAL/INCISIONAL HERNIA REPAIR N/A 10/22/2019    Procedure: EXPLORATORY LAPAROTOMY, EXTENSIVE LYSIS OF ADHESIONS, EXPLANT OF PREVIOUS  HERNIA MESH, BILATERAL SUBCUTANEOUS SKIN FLAPS, RIGHT SIDED EXTERNAL OBLIQUE RELEASE, VENTRAL  HERNIA REPAIR WITH MESH UNDERLAY AND SMALL BOWEL RESECTION WITH PRIMARY ANASTOMOSIS;  Surgeon: Poncho Nelson MD;  Location: [facility];  Service: General       Family History: family history includes Cancer in his brother; Diabetes in his son. Otherwise pertinent FHx was reviewed and not pertinent to current issue.    Social History:  reports that he quit smoking about 50 years ago. His smoking use included cigarettes. He started smoking about 70 years ago. He has a 10 pack-year smoking history. He has been exposed to tobacco smoke. He quit smokeless tobacco use about 5 years ago.  His smokeless tobacco use included chew. He reports that he does not drink alcohol and does not use drugs.    Home Medications:  Prior to Admission Medications       Prescriptions Last Dose Informant Patient Reported? Taking?    aspirin 81 MG chewable tablet   Yes No    Chew 1 tablet Daily.    EQ Allergy Relief 10 MG tablet   No No    Take 1 tablet by mouth once daily    fluticasone (FLONASE) 50 MCG/ACT nasal spray   No No    2 sprays into the nostril(s) as directed by provider Daily.    levothyroxine  (SYNTHROID, LEVOTHROID) 112 MCG tablet   No No    Take 1 tablet by mouth once daily              Allergies:  No Known Allergies    Objective      Vitals:   Temp:  [97.9 °F (36.6 °C)] 97.9 °F (36.6 °C)  Heart Rate:  [63-85] 70  Resp:  [16-18] 18  BP: (102-124)/(54-76) 117/57  Body mass index is 25.7 kg/m².  Physical Exam  Constitutional:       Appearance: Normal appearance.   HENT:      Head: Normocephalic and atraumatic.      Nose: Nose normal.      Mouth/Throat:      Mouth: Mucous membranes are dry.      Pharynx: No oropharyngeal exudate or posterior oropharyngeal erythema.   Eyes:      Extraocular Movements: Extraocular movements intact.      Pupils: Pupils are equal, round, and reactive to light.   Cardiovascular:      Rate and Rhythm: Normal rate and regular rhythm.      Pulses: Normal pulses.      Heart sounds: Normal heart sounds. No murmur heard.  Pulmonary:      Breath sounds: Normal breath sounds.   Abdominal:      General: Abdomen is flat.      Palpations: Abdomen is soft.      Tenderness: There is abdominal tenderness. There is no guarding or rebound.      Hernia: A hernia is present.      Comments: Mild periumbilical tenderness.    Musculoskeletal:         General: Normal range of motion.      Cervical back: Neck supple. No rigidity or tenderness.   Skin:     General: Skin is dry.      Capillary Refill: Capillary refill takes less than 2 seconds.   Neurological:      General: No focal deficit present.      Mental Status: He is alert.   Psychiatric:         Behavior: Behavior normal.         Diagnostic Data:  Lab Results (last 24 hours)       Procedure Component Value Units Date/Time    Basic Metabolic Panel [[id number]]  (Abnormal) Collected: 05/29/24 0734    Specimen: Blood Updated: 05/29/24 0804     Glucose 95 mg/dL      BUN 15 mg/dL      Creatinine 0.86 mg/dL      Sodium 134 mmol/L      Potassium 3.6 mmol/L      Chloride 103 mmol/L      CO2 21.3 mmol/L      Calcium 8.2 mg/dL      BUN/Creatinine Ratio  17.4     Anion Gap 9.7 mmol/L      eGFR 83.3 mL/min/1.73     Narrative:      GFR Normal >60  Chronic Kidney Disease <60  Kidney Failure <15    The GFR formula is only valid for adults with stable renal function between ages 18 and 70.    CBC & Differential [[id number]]  (Abnormal) Collected: 05/29/24 0734    Specimen: Blood Updated: 05/29/24 0757    Narrative:      The following orders were created for panel order CBC & Differential.  Procedure                               Abnormality         Status                     ---------                               -----------         ------                     CBC Auto Differential[[id number]]        Abnormal            Final result                 Please view results for these tests on the individual orders.    CBC Auto Differential [[id number]]  (Abnormal) Collected: 05/29/24 0734    Specimen: Blood Updated: 05/29/24 0757     WBC 16.02 10*3/mm3      RBC 3.83 10*6/mm3      Hemoglobin 11.5 g/dL      Hematocrit 36.3 %      MCV 94.8 fL      MCH 30.0 pg      MCHC 31.7 g/dL      RDW 13.4 %      RDW-SD 46.3 fl      MPV 10.9 fL      Platelets 261 10*3/mm3      Neutrophil % 77.1 %      Lymphocyte % 12.7 %      Monocyte % 8.6 %      Eosinophil % 0.3 %      Basophil % 0.6 %      Immature Grans % 0.7 %      Neutrophils, Absolute 12.36 10*3/mm3      Lymphocytes, Absolute 2.03 10*3/mm3      Monocytes, Absolute 1.37 10*3/mm3      Eosinophils, Absolute 0.05 10*3/mm3      Basophils, Absolute 0.10 10*3/mm3      Immature Grans, Absolute 0.11 10*3/mm3      nRBC 0.0 /100 WBC     Procalcitonin [[id number]]  (Normal) Collected: 05/29/24 0040    Specimen: Blood from Arm, Right Updated: 05/29/24 0450     Procalcitonin 0.08 ng/mL     Narrative:      As a Marker for Sepsis (Non-Neonates):    1. <0.5 ng/mL represents a low risk of severe sepsis and/or septic shock.  2. >2 ng/mL represents a high risk of severe sepsis and/or septic shock.    As a Marker for Lower Respiratory Tract Infections that  "require antibiotic therapy:    PCT on Admission    Antibiotic Therapy       6-12 Hrs later    >0.5                Strongly Recommended  >0.25 - <0.5        Recommended   0.1 - 0.25          Discouraged              Remeasure/reassess PCT  <0.1                Strongly Discouraged     Remeasure/reassess PCT    As 28 day mortality risk marker: \"Change in Procalcitonin Result\" (>80% or <=80%) if Day 0 (or Day 1) and Day 4 values are available. Refer to [email or web address]    Change in PCT <=80%  A decrease of PCT levels below or equal to 80% defines a positive change in PCT test result representing a higher risk for 28-day all-cause mortality of patients diagnosed with severe sepsis for septic shock.    Change in PCT >80%  A decrease of PCT levels of more than 80% defines a negative change in PCT result representing a lower risk for 28-day all-cause mortality of patients diagnosed with severe sepsis or septic shock.       Urinalysis, Microscopic Only - Urine, Clean Catch [[id number]]  (Abnormal) Collected: 05/29/24 0411    Specimen: Urine, Clean Catch Updated: 05/29/24 0446     RBC, UA None Seen /HPF      WBC, UA 21-50 /HPF      Bacteria, UA 4+ /HPF      Squamous Epithelial Cells, UA 3-6 /HPF      Hyaline Casts, UA 0-2 /LPF      Methodology Manual Light Microscopy    Urinalysis With Microscopic If Indicated (No Culture) - Urine, Clean Catch [[id number]]  (Abnormal) Collected: 05/29/24 0411    Specimen: Urine, Clean Catch Updated: 05/29/24 0430     Color, UA Orange     Comment: Any Substance that causes an abnormal urine color can alter the accuracy of the chemical reactions.        Appearance, UA Cloudy     pH, UA 5.5     Specific Gravity, UA 1.046     Glucose, UA Negative     Ketones, UA Negative     Bilirubin, UA Negative     Blood, UA Negative     Protein, UA 30 mg/dL (1+)     Leuk Esterase, UA Moderate (2+)     Nitrite, UA Negative     Urobilinogen, UA 1.0 E.U./dL    Comprehensive Metabolic Panel " [[id number]]  (Abnormal) Collected: 05/29/24 0040    Specimen: Blood from Arm, Right Updated: 05/29/24 0148     Glucose 119 mg/dL      BUN 17 mg/dL      Creatinine 1.15 mg/dL      Sodium 135 mmol/L      Potassium 3.8 mmol/L      Chloride 98 mmol/L      CO2 24.5 mmol/L      Calcium 9.3 mg/dL      Total Protein 7.9 g/dL      Albumin 4.0 g/dL      ALT (SGPT) 9 U/L      AST (SGOT) 19 U/L      Alkaline Phosphatase 86 U/L      Total Bilirubin 0.6 mg/dL      Globulin 3.9 gm/dL      A/G Ratio 1.0 g/dL      BUN/Creatinine Ratio 14.8     Anion Gap 12.5 mmol/L      eGFR 61.2 mL/min/1.73     Narrative:      GFR Normal >60  Chronic Kidney Disease <60  Kidney Failure <15    The GFR formula is only valid for adults with stable renal function between ages 18 and 70.    Lipase [[id number]]  (Normal) Collected: 05/29/24 0040    Specimen: Blood from Arm, Right Updated: 05/29/24 0148     Lipase 20 U/L     CBC & Differential [[id number]]  (Abnormal) Collected: 05/29/24 0040    Specimen: Blood from Arm, Right Updated: 05/29/24 0046    Narrative:      The following orders were created for panel order CBC & Differential.  Procedure                               Abnormality         Status                     ---------                               -----------         ------                     CBC Auto Differential[[id number]]        Abnormal            Final result                 Please view results for these tests on the individual orders.    CBC Auto Differential [[id number]]  (Abnormal) Collected: 05/29/24 0040    Specimen: Blood from Arm, Right Updated: 05/29/24 0046     WBC 18.60 10*3/mm3      RBC 4.49 10*6/mm3      Hemoglobin 13.5 g/dL      Hematocrit 42.6 %      MCV 94.9 fL      MCH 30.1 pg      MCHC 31.7 g/dL      RDW 13.4 %      RDW-SD 47.0 fl      MPV 10.9 fL      Platelets 313 10*3/mm3      Neutrophil % 78.8 %      Lymphocyte % 12.1 %      Monocyte % 7.7 %      Eosinophil % 0.3 %      Basophil % 0.5 %      Immature Grans % 0.6 %       Neutrophils, Absolute 14.65 10*3/mm3      Lymphocytes, Absolute 2.25 10*3/mm3      Monocytes, Absolute 1.44 10*3/mm3      Eosinophils, Absolute 0.05 10*3/mm3      Basophils, Absolute 0.10 10*3/mm3      Immature Grans, Absolute 0.11 10*3/mm3      nRBC 0.0 /100 WBC              Imaging Results (Last 24 Hours)       Procedure Component Value Units Date/Time    CT Abdomen Pelvis With Contrast [[id number]] Collected: 05/29/24 0350     Updated: 05/29/24 0357    Narrative:      CT ABDOMEN PELVIS W CONTRAST    Date of Exam: 5/29/2024 3:00 AM EDT    Indication: RLQ abdominal pain.    Comparison: 11/4/2011. KUB 10/26/2019.    Technique: Axial CT images were obtained of the abdomen and pelvis following the uneventful intravenous administration of iodinated contrast. Sagittal and coronal reconstructions were performed.  Automated exposure control and iterative reconstruction   methods were used.        Findings:  There is dependent bibasilar atelectasis. The distal esophagus is unremarkable. There is a small sliding-type hiatal hernia. Heart size is normal. There are mild coronary artery calcifications. There is aortic atherosclerosis. There is a large fat, colon   and small bowel containing midline ventral hernia in the supraumbilical region. There is evidence of prior left inguinal/lateral hernia repair. There is evidence of prior ventral laparotomy. No acute osseous abnormality or destructive bone lesion. There   is mild osteoarthritis at the hips. There is moderate lumbar spondylosis.    There is elevation of the right hemidiaphragm. There is focal fatty infiltration in the liver adjacent to the falciform ligament. There is a large gallstone. No evidence of acute cholecystitis. The bile ducts, pancreas, spleen and adrenal glands appear   within normal limits. There are multiple simple appearing bilateral kidney cysts with the largest occurring at the right superior pole measuring 57 mm diameter. No urolithiasis or  hydronephrosis. There is flaccid dilatation of the urinary bladder.   Prostate gland is normal size. There is diffuse wall thickening noted throughout the colon with mucosal hyperenhancement and adjacent inflammatory fat stranding, which is most notable at the proximal colon. There is mild distal colonic diverticulosis.   There is rectal wall thickening similar to the colon. No small bowel distention. There is mild aortoiliac atherosclerotic disease. 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 There is aneurysmal   dilatation of the right common iliac artery up to 18 mm and the left common iliac artery up to 17 mm. No ascites, or pneumoperitoneum. There are mildly enlarged mesenteric lymph nodes in the right side of the abdomen measuring up to 10 mm in the short   axis as seen on axial image 79.      Impression:      Impression:    1. Pancolitis, which is most likely infectious or inflammatory.  2. Large ventral hernia containing a portion of the transverse colon and small bowel. No bowel obstruction.  3. Evidence of prior left inguinal hernia repair.  4. Cholelithiasis without evidence of acute cholecystitis.            Electronically Signed: Jose Manuel Mcmahan MD    5/29/2024 3:55 AM EDT    Workstation ID: [id number]              Assessment & Plan        This is a 89 y.o. male with:    Active and Resolved Problems  Active Hospital Problems    Diagnosis  POA    **Diarrhea [R19.7]  Yes      Resolved Hospital Problems   No resolved problems to display.       Infectious colitis  Mild dehydration  Leukocytosis  CT A/P showing findings of pancolitis, no bowel obstruction.   Afebrile, normal HR during ER course  WBC 18.6 on admission  Follow GI enteric panel  Start IV ceftriaxone and flagyl  Gentle IV fluid hydration, NS 75 ml/hr  Started on CLD, advance diet as tolerated    History of Hypothyroidism  Continue levothyroxine home dose    History of Hypertension  BP currently  controlled off BP medications. Continue to monitor    DVT prophylaxis:  Mechanical DVT prophylaxis orders are present.        The patient desires to be as follows:    CODE STATUS:    Code Status (Patient has no pulse and is not breathing): CPR (Attempt to Resuscitate)  Medical Interventions (Patient has pulse or is breathing): Full Support          Admission Status:  I believe this patient meets observation status.    Expected Length of Stay: 1-2 days    PDMP and Medication Dispenses via Sidebar reviewed and consistent with patient reported medications.    I discussed the patient's findings and my recommendations with patient.      Signature:     This document has been electronically signed by Carlos Llanes Alvarez, MD on May 29, 2024 10:46 EDT   [facility] Hospitalist Team

## 2024-05-30 LAB
ADV 40+41 DNA STL QL NAA+NON-PROBE: NOT DETECTED
ANION GAP SERPL CALCULATED.3IONS-SCNC: 8.7 MMOL/L (ref 5–15)
ASTRO TYP 1-8 RNA STL QL NAA+NON-PROBE: NOT DETECTED
BASOPHILS # BLD AUTO: 0.1 10*3/MM3 (ref 0–0.2)
BASOPHILS NFR BLD AUTO: 0.8 % (ref 0–1.5)
BUN SERPL-MCNC: 13 MG/DL (ref 8–23)
BUN/CREAT SERPL: 16.9 (ref 7–25)
C CAYETANENSIS DNA STL QL NAA+NON-PROBE: NOT DETECTED
C COLI+JEJ+UPSA DNA STL QL NAA+NON-PROBE: DETECTED
CALCIUM SPEC-SCNC: 8.3 MG/DL (ref 8.6–10.5)
CHLORIDE SERPL-SCNC: 105 MMOL/L (ref 98–107)
CO2 SERPL-SCNC: 21.3 MMOL/L (ref 22–29)
CREAT SERPL-MCNC: 0.77 MG/DL (ref 0.76–1.27)
CRYPTOSP DNA STL QL NAA+NON-PROBE: NOT DETECTED
DEPRECATED RDW RBC AUTO: 47.8 FL (ref 37–54)
E COLI O157 DNA STL QL NAA+NON-PROBE: NOT DETECTED
E HISTOLYT DNA STL QL NAA+NON-PROBE: NOT DETECTED
EAEC PAA PLAS AGGR+AATA ST NAA+NON-PRB: NOT DETECTED
EC STX1+STX2 GENES STL QL NAA+NON-PROBE: DETECTED
EGFRCR SERPLBLD CKD-EPI 2021: 86.1 ML/MIN/1.73
EOSINOPHIL # BLD AUTO: 0.33 10*3/MM3 (ref 0–0.4)
EOSINOPHIL NFR BLD AUTO: 2.5 % (ref 0.3–6.2)
ERYTHROCYTE [DISTWIDTH] IN BLOOD BY AUTOMATED COUNT: 13.2 % (ref 12.3–15.4)
ETEC LTA+ST1A+ST1B TOX ST NAA+NON-PROBE: NOT DETECTED
G LAMBLIA DNA STL QL NAA+NON-PROBE: NOT DETECTED
GLUCOSE SERPL-MCNC: 90 MG/DL (ref 65–99)
HCT VFR BLD AUTO: 38 % (ref 37.5–51)
HGB BLD-MCNC: 11.7 G/DL (ref 13–17.7)
IMM GRANULOCYTES # BLD AUTO: 0.11 10*3/MM3 (ref 0–0.05)
IMM GRANULOCYTES NFR BLD AUTO: 0.8 % (ref 0–0.5)
LYMPHOCYTES # BLD AUTO: 2.01 10*3/MM3 (ref 0.7–3.1)
LYMPHOCYTES NFR BLD AUTO: 15.5 % (ref 19.6–45.3)
MCH RBC QN AUTO: 29.8 PG (ref 26.6–33)
MCHC RBC AUTO-ENTMCNC: 30.8 G/DL (ref 31.5–35.7)
MCV RBC AUTO: 96.9 FL (ref 79–97)
MONOCYTES # BLD AUTO: 1.14 10*3/MM3 (ref 0.1–0.9)
MONOCYTES NFR BLD AUTO: 8.8 % (ref 5–12)
NEUTROPHILS NFR BLD AUTO: 71.6 % (ref 42.7–76)
NEUTROPHILS NFR BLD AUTO: 9.26 10*3/MM3 (ref 1.7–7)
NOROVIRUS GI+II RNA STL QL NAA+NON-PROBE: DETECTED
NRBC BLD AUTO-RTO: 0 /100 WBC (ref 0–0.2)
P SHIGELLOIDES DNA STL QL NAA+NON-PROBE: NOT DETECTED
PLATELET # BLD AUTO: 271 10*3/MM3 (ref 140–450)
PMV BLD AUTO: 10.6 FL (ref 6–12)
POTASSIUM SERPL-SCNC: 3.5 MMOL/L (ref 3.5–5.2)
QT INTERVAL: 406 MS
QTC INTERVAL: 427 MS
RBC # BLD AUTO: 3.92 10*6/MM3 (ref 4.14–5.8)
RVA RNA STL QL NAA+NON-PROBE: NOT DETECTED
S ENT+BONG DNA STL QL NAA+NON-PROBE: NOT DETECTED
SAPO I+II+IV+V RNA STL QL NAA+NON-PROBE: NOT DETECTED
SHIGELLA SP+EIEC IPAH ST NAA+NON-PROBE: NOT DETECTED
SODIUM SERPL-SCNC: 135 MMOL/L (ref 136–145)
V CHOL+PARA+VUL DNA STL QL NAA+NON-PROBE: NOT DETECTED
V CHOLERAE DNA STL QL NAA+NON-PROBE: NOT DETECTED
WBC NRBC COR # BLD AUTO: 12.95 10*3/MM3 (ref 3.4–10.8)
Y ENTEROCOL DNA STL QL NAA+NON-PROBE: NOT DETECTED

## 2024-05-30 PROCEDURE — 85025 COMPLETE CBC W/AUTO DIFF WBC: CPT

## 2024-05-30 PROCEDURE — 25010000002 CEFTRIAXONE PER 250 MG: Performed by: STUDENT IN AN ORGANIZED HEALTH CARE EDUCATION/TRAINING PROGRAM

## 2024-05-30 PROCEDURE — 93005 ELECTROCARDIOGRAM TRACING: CPT | Performed by: INTERNAL MEDICINE

## 2024-05-30 PROCEDURE — 25010000002 METRONIDAZOLE 500 MG/100ML SOLUTION: Performed by: STUDENT IN AN ORGANIZED HEALTH CARE EDUCATION/TRAINING PROGRAM

## 2024-05-30 PROCEDURE — 80048 BASIC METABOLIC PNL TOTAL CA: CPT

## 2024-05-30 PROCEDURE — 87449 NOS EACH ORGANISM AG IA: CPT | Performed by: INTERNAL MEDICINE

## 2024-05-30 PROCEDURE — 25810000003 SODIUM CHLORIDE 0.9 % SOLUTION: Performed by: STUDENT IN AN ORGANIZED HEALTH CARE EDUCATION/TRAINING PROGRAM

## 2024-05-30 PROCEDURE — 87507 IADNA-DNA/RNA PROBE TQ 12-25: CPT | Performed by: INTERNAL MEDICINE

## 2024-05-30 PROCEDURE — 87324 CLOSTRIDIUM AG IA: CPT | Performed by: INTERNAL MEDICINE

## 2024-05-30 RX ORDER — AZITHROMYCIN 250 MG/1
500 TABLET, FILM COATED ORAL
Qty: 6 TABLET | Refills: 0 | Status: DISCONTINUED | OUTPATIENT
Start: 2024-05-30 | End: 2024-05-31 | Stop reason: HOSPADM

## 2024-05-30 RX ADMIN — METRONIDAZOLE 500 MG: 500 INJECTION, SOLUTION INTRAVENOUS at 03:55

## 2024-05-30 RX ADMIN — ASPIRIN 81 MG CHEWABLE TABLET 81 MG: 81 TABLET CHEWABLE at 09:23

## 2024-05-30 RX ADMIN — SODIUM CHLORIDE 75 ML/HR: 9 INJECTION, SOLUTION INTRAVENOUS at 04:05

## 2024-05-30 RX ADMIN — AZITHROMYCIN DIHYDRATE 500 MG: 250 TABLET ORAL at 20:52

## 2024-05-30 RX ADMIN — CEFTRIAXONE SODIUM: 2 INJECTION, POWDER, FOR SOLUTION INTRAMUSCULAR; INTRAVENOUS at 12:58

## 2024-05-30 RX ADMIN — METRONIDAZOLE 500 MG: 500 INJECTION, SOLUTION INTRAVENOUS at 12:58

## 2024-05-30 RX ADMIN — LEVOTHYROXINE SODIUM 112 MCG: 0.11 TABLET ORAL at 09:23

## 2024-05-30 RX ADMIN — Medication 10 ML: at 20:52

## 2024-05-30 RX ADMIN — CETIRIZINE HYDROCHLORIDE 10 MG: 10 TABLET, FILM COATED ORAL at 09:23

## 2024-05-30 RX ADMIN — Medication 10 ML: at 09:25

## 2024-05-30 NOTE — PLAN OF CARE
Problem: Adult Inpatient Plan of Care  Goal: Plan of Care Review  Outcome: Ongoing, Progressing  Goal: Patient-Specific Goal (Individualized)  Outcome: Ongoing, Progressing  Goal: Absence of Hospital-Acquired Illness or Injury  Outcome: Ongoing, Progressing  Intervention: Identify and Manage Fall Risk  Recent Flowsheet Documentation  Taken 5/30/2024 0414 by Jenniffer Vergara LPN  Safety Promotion/Fall Prevention:   activity supervised   assistive device/personal items within reach  Taken 5/30/2024 0152 by Jenniffer Vergara LPN  Safety Promotion/Fall Prevention:   activity supervised   assistive device/personal items within reach  Intervention: Prevent Skin Injury  Recent Flowsheet Documentation  Taken 5/30/2024 0414 by Jenniffer Vergara LPN  Body Position: position changed independently  Skin Protection: adhesive use limited  Intervention: Prevent and Manage VTE (Venous Thromboembolism) Risk  Recent Flowsheet Documentation  Taken 5/30/2024 0414 by Jenniffer Vergara LPN  Activity Management: bedrest  VTE Prevention/Management:   bilateral   sequential compression devices off   patient refused intervention  Range of Motion: active ROM (range of motion) encouraged  Intervention: Prevent Infection  Recent Flowsheet Documentation  Taken 5/30/2024 0414 by Jenniffer Vergara LPN  Infection Prevention:   environmental surveillance performed   equipment surfaces disinfected  Taken 5/30/2024 0152 by Jenniffer Vergara LPN  Infection Prevention: rest/sleep promoted  Goal: Optimal Comfort and Wellbeing  Outcome: Ongoing, Progressing  Intervention: Provide Person-Centered Care  Recent Flowsheet Documentation  Taken 5/30/2024 0414 by Jenniffer Vergara LPN  Trust Relationship/Rapport:   care explained   emotional support provided   questions answered   reassurance provided   thoughts/feelings acknowledged  Goal: Readiness for Transition of Care  Outcome: Ongoing, Progressing     Problem: Adjustment to Illness (Sepsis/Septic Shock)  Goal:  Optimal Coping  Outcome: Ongoing, Progressing  Intervention: Optimize Psychosocial Adjustment to Illness  Recent Flowsheet Documentation  Taken 5/30/2024 0414 by Jenniffer Vergara LPN  Family/Support System Care: self-care encouraged     Problem: Bleeding (Sepsis/Septic Shock)  Goal: Absence of Bleeding  Outcome: Ongoing, Progressing     Problem: Glycemic Control Impaired (Sepsis/Septic Shock)  Goal: Blood Glucose Level Within Desired Range  Outcome: Ongoing, Progressing     Problem: Infection Progression (Sepsis/Septic Shock)  Goal: Absence of Infection Signs and Symptoms  Outcome: Ongoing, Progressing  Intervention: Initiate Sepsis Management  Recent Flowsheet Documentation  Taken 5/30/2024 0414 by Jenniffer Vergara LPN  Infection Prevention:   environmental surveillance performed   equipment surfaces disinfected  Isolation Precautions: precautions maintained  Taken 5/30/2024 0152 by Jenniffer Vergara LPN  Infection Prevention: rest/sleep promoted  Isolation Precautions: precautions maintained  Intervention: Promote Recovery  Recent Flowsheet Documentation  Taken 5/30/2024 0414 by Jenniffer Vergara LPN  Activity Management: bedrest     Problem: Nutrition Impaired (Sepsis/Septic Shock)  Goal: Optimal Nutrition Intake  Outcome: Ongoing, Progressing     Problem: Skin Injury Risk Increased  Goal: Skin Health and Integrity  Outcome: Ongoing, Progressing  Intervention: Optimize Skin Protection  Recent Flowsheet Documentation  Taken 5/30/2024 0414 by Jenniffer Vergara LPN  Head of Bed (HOB) Positioning: HOB lowered  Skin Protection: adhesive use limited   Goal Outcome Evaluation:

## 2024-05-30 NOTE — PLAN OF CARE
Goal Outcome Evaluation:  Plan of Care Reviewed With: patient   Pt continues having loose stools. Waiting on GI Panel and C diff results. Pt pleasant and cont IV Abx.

## 2024-05-30 NOTE — PROGRESS NOTES
Hospitalist Service   Daily Progress Note      Patient Name: Christiano Guzmán  : 1935  MRN: [id number]  Primary Care Physician:  Radha Greene APRN  Date of admission: 2024      Subjective      Chief Complaint: Diarrhea    Patient seen and examined this morning.  Taking over care today.  Continues to have watery stool, C. difficile testing ordered.  GI panel also pending.  Urine culture added on due to concern for UTI as well.  Discussed with nursing.  Patient denies any recent antibiotic use or diet changes.    Pertinent positives as noted in HPI/subjective.  All other systems were reviewed and are negative.      Objective      Vitals:   Temp:  [97.4 °F (36.3 °C)-97.8 °F (36.6 °C)] 97.4 °F (36.3 °C)  Heart Rate:  [64-73] 68  Resp:  [14-18] 14  BP: (105-133)/(46-68) 132/62    Physical Exam:    General: Awake, alert, NAD  Eyes: PERRL, EOMI, conjunctivae are clear  Cardiovascular: Regular rate and rhythm, no murmurs  Respiratory: Clear to auscultation bilaterally, no wheezing or rales, unlabored breathing  Abdomen: Soft, nontender, ventral hernia noted, positive bowel sounds, no guarding  Neurologic: A&O, CN grossly intact, moves all extremities spontaneously  Musculoskeletal: Normal range of motion, no other gross deformities  Skin: Warm, dry         Result Review    Result Review:  I have personally reviewed the results from the time of this admission to 2024 10:23 EDT and agree with these findings:  [x]  Laboratory  [x]  Microbiology  [x]  Radiology  []  EKG/Telemetry   []  Cardiology/Vascular   []  Pathology  [x]  Old records  []  Other:          Assessment & Plan      Brief Patient Summary:  Christiano Guzmán is a 89 y.o. male who       aspirin, 81 mg, Oral, Daily  cefTRIAXone, 2,000 mg, Intravenous, Q24H  cetirizine, 10 mg, Oral, Daily  levothyroxine, 112 mcg, Oral, Q AM  metroNIDAZOLE, 500 mg, Intravenous, Q8H  sodium chloride, 10 mL, Intravenous, Q12H       sodium chloride, 75 mL/hr, Last  Rate: 75 mL/hr (05/30/24 0405)         I have utilized all available, immediate resources to obtain, update, or review the patient's current medications including all prescriptions, over-the-counter products, herbals, cannabis/cannabidiol products, and vitamin.mineral/dietary (nutritional) supplements.    Active Hospital Problems:  Active Hospital Problems    Diagnosis     **Diarrhea        Assessment/Plan:     Pancolitis  -CT findings noted  -Patient denies any recent antibiotic use advised changes  -C. difficile and GI panel ordered  -Continue empiric IV ceftriaxone and Flagyl for now    Acute UTI  -UA noted, urine culture added on  -Antibiotics as above    Hypothyroidism  -Continue levothyroxine    DVT prophylaxis.    CODE STATUS:    Code Status (Patient has no pulse and is not breathing): CPR (Attempt to Resuscitate)  Medical Interventions (Patient has pulse or is breathing): Full Support      Disposition:      Electronically signed by Michael Scruggs DO, 05/30/24, 10:23 EDT.  [facility] Hospitalist Team      Part of this note may be an electronic transcription/translation of spoken language to printed text using the Dragon Dictation System.

## 2024-05-31 ENCOUNTER — READMISSION MANAGEMENT (OUTPATIENT)
Dept: CALL CENTER | Facility: HOSPITAL | Age: 89
End: 2024-05-31
Payer: MEDICARE

## 2024-05-31 VITALS
HEIGHT: 68 IN | RESPIRATION RATE: 14 BRPM | BODY MASS INDEX: 25.61 KG/M2 | WEIGHT: 169 LBS | HEART RATE: 62 BPM | OXYGEN SATURATION: 94 % | SYSTOLIC BLOOD PRESSURE: 130 MMHG | DIASTOLIC BLOOD PRESSURE: 62 MMHG | TEMPERATURE: 97.2 F

## 2024-05-31 LAB
ANION GAP SERPL CALCULATED.3IONS-SCNC: 9.1 MMOL/L (ref 5–15)
BASOPHILS # BLD AUTO: 0.11 10*3/MM3 (ref 0–0.2)
BASOPHILS NFR BLD AUTO: 1 % (ref 0–1.5)
BUN SERPL-MCNC: 9 MG/DL (ref 8–23)
BUN/CREAT SERPL: 10.5 (ref 7–25)
C DIFF GDH + TOXINS A+B STL QL IA.RAPID: NEGATIVE
C DIFF GDH + TOXINS A+B STL QL IA.RAPID: NEGATIVE
CALCIUM SPEC-SCNC: 8.4 MG/DL (ref 8.6–10.5)
CHLORIDE SERPL-SCNC: 104 MMOL/L (ref 98–107)
CO2 SERPL-SCNC: 24.9 MMOL/L (ref 22–29)
CREAT SERPL-MCNC: 0.86 MG/DL (ref 0.76–1.27)
DEPRECATED RDW RBC AUTO: 45.9 FL (ref 37–54)
EGFRCR SERPLBLD CKD-EPI 2021: 83.3 ML/MIN/1.73
EOSINOPHIL # BLD AUTO: 0.48 10*3/MM3 (ref 0–0.4)
EOSINOPHIL NFR BLD AUTO: 4.3 % (ref 0.3–6.2)
ERYTHROCYTE [DISTWIDTH] IN BLOOD BY AUTOMATED COUNT: 13.1 % (ref 12.3–15.4)
GLUCOSE SERPL-MCNC: 93 MG/DL (ref 65–99)
HCT VFR BLD AUTO: 41.1 % (ref 37.5–51)
HGB BLD-MCNC: 13 G/DL (ref 13–17.7)
IMM GRANULOCYTES # BLD AUTO: 0.23 10*3/MM3 (ref 0–0.05)
IMM GRANULOCYTES NFR BLD AUTO: 2.1 % (ref 0–0.5)
LYMPHOCYTES # BLD AUTO: 2.07 10*3/MM3 (ref 0.7–3.1)
LYMPHOCYTES NFR BLD AUTO: 18.6 % (ref 19.6–45.3)
MCH RBC QN AUTO: 30 PG (ref 26.6–33)
MCHC RBC AUTO-ENTMCNC: 31.6 G/DL (ref 31.5–35.7)
MCV RBC AUTO: 94.7 FL (ref 79–97)
MONOCYTES # BLD AUTO: 0.97 10*3/MM3 (ref 0.1–0.9)
MONOCYTES NFR BLD AUTO: 8.7 % (ref 5–12)
NEUTROPHILS NFR BLD AUTO: 65.3 % (ref 42.7–76)
NEUTROPHILS NFR BLD AUTO: 7.27 10*3/MM3 (ref 1.7–7)
NRBC BLD AUTO-RTO: 0 /100 WBC (ref 0–0.2)
PLATELET # BLD AUTO: 343 10*3/MM3 (ref 140–450)
PMV BLD AUTO: 11 FL (ref 6–12)
POTASSIUM SERPL-SCNC: 3.4 MMOL/L (ref 3.5–5.2)
RBC # BLD AUTO: 4.34 10*6/MM3 (ref 4.14–5.8)
SODIUM SERPL-SCNC: 138 MMOL/L (ref 136–145)
WBC NRBC COR # BLD AUTO: 11.13 10*3/MM3 (ref 3.4–10.8)

## 2024-05-31 PROCEDURE — 80048 BASIC METABOLIC PNL TOTAL CA: CPT

## 2024-05-31 PROCEDURE — 85025 COMPLETE CBC W/AUTO DIFF WBC: CPT

## 2024-05-31 RX ORDER — CIPROFLOXACIN 500 MG/1
500 TABLET, FILM COATED ORAL 2 TIMES DAILY
Qty: 20 TABLET | Refills: 0 | Status: SHIPPED | OUTPATIENT
Start: 2024-05-31 | End: 2024-06-10

## 2024-05-31 RX ADMIN — CETIRIZINE HYDROCHLORIDE 10 MG: 10 TABLET, FILM COATED ORAL at 08:04

## 2024-05-31 RX ADMIN — LEVOTHYROXINE SODIUM 112 MCG: 0.11 TABLET ORAL at 06:17

## 2024-05-31 RX ADMIN — ASPIRIN 81 MG CHEWABLE TABLET 81 MG: 81 TABLET CHEWABLE at 08:04

## 2024-05-31 RX ADMIN — Medication 10 ML: at 08:04

## 2024-05-31 NOTE — OUTREACH NOTE
Prep Survey      Flowsheet Row Responses   [facility] patient discharged from? [facility]   Is LACE score < 7 ? Yes   Eligibility [facility]   Date of Admission 05/29/24   Date of Discharge 05/31/24   Discharge Disposition Home or Self Care   Discharge diagnosis *Diarrhea   Does the patient have one of the following disease processes/diagnoses(primary or secondary)? Other   Does the patient have Home health ordered? No   Is there a DME ordered? No   Prep survey completed? Yes            DAVID SNOW - Registered Nurse

## 2024-05-31 NOTE — CASE MANAGEMENT/SOCIAL WORK
Case Management Discharge Note      Final Note: Routine home.    Provided Post Acute Provider List?: N/A  Provided Post Acute Provider Quality & Resource List?: N/A    Selected Continued Care - Discharged on 5/31/2024 Admission date: 5/29/2024 - Discharge disposition: Home or Self Care       Transportation Services  Private: Car    Final Discharge Disposition Code: 01 - home or self-care

## 2024-05-31 NOTE — DISCHARGE SUMMARY
[facility] Medicine Services  Discharge Summary    Date of Service: 2024  Patient Name: Christiano Guzmán  : 1935  MRN: [id number]    Date of Admission: 2024  Discharge Diagnosis: Diarrhea secondary to Campylobacter, E. coli, as well as norovirus.  Date of Discharge: 2024  Primary Care Physician: Radha Greene APRN      Presenting Problem:   Diarrhea [R19.7]  Colitis [K52.9]    Active and Resolved Hospital Problems:  Active Hospital Problems    Diagnosis POA    **Diarrhea [R19.7] Yes      Resolved Hospital Problems   No resolved problems to display.         Hospital Course     HPI:  History of Present Illness: Christiano Guzmán is a 89 y.o. male with a PMH of Incarcerated hernia S/P hernia repair,  HTN, BPH, Hypothyroidism, who presented to [facility] on 2024 with diffuse abdominal pain and several episodes of watery diarrhea since the past 4 days.  Patient reports his pain as diffuse but slightly worse in RLQ. Denies eating leftovers, recent travels, fever, chills, cough, SOB, sore throat, rashes, sick contacts, melena, hematemesis. Workup in ER CT A/P showed findings of pancolitis, there is a large ventral hernia, no reported SBO. Workup in ER, chemistry labs notable for mild hyponatremia Na 134, K 3.6, CBC labs notable for leukocytosis WBC 18.6. Patient states his abdominal pain and diarrhea have significantly improved since he presented to ER. The decision to admit was made.       Hospital Course:  Date of discharge patient's laboratory studies are otherwise unremarkable.  His diarrhea has now resolved.  He endorses no loose stools over the past 24 hours.  His creatinine currently is normal as well.  Stool cultures are consistent with Campylobacter, norovirus, as well as E. coli.  He will be transition to p.o. Cipro at time of discharge for 10 days.  I have asked him not to drink any dairy products for the next 2 to 3 weeks.  He states that he is well  supported at home with numerous children checking up on him on a daily basis.  He is very enthusiastic about going home later this afternoon.  He is able to tolerate a diet well without any difficulty.  In regards to initial CT imaging it did reveal a large ventral hernia however patient states that he does not wish to have any acute intervention at the present time.  Patient has been encouraged to follow-up with his primary care provider in the next 7 to 10 days postdischarge.            DISCHARGE Follow Up Recommendations for labs and diagnostics: BMP in 7 to 10 days      Reasons For Change In Medications and Indications for New Medications:      Day of Discharge     Vital Signs:  Temp:  [97.2 °F (36.2 °C)-97.8 °F (36.6 °C)] 97.2 °F (36.2 °C)  Heart Rate:  [62-68] 62  Resp:  [14-18] 14  BP: (130-136)/(62-70) 130/62    Physical Exam:  Physical Exam  Constitutional:       General: He is awake.      Appearance: Normal appearance. He is well-developed and well-groomed.   HENT:      Head: Normocephalic and atraumatic.      Nose: Nose normal.      Mouth/Throat:      Mouth: Mucous membranes are moist.      Pharynx: Oropharynx is clear.   Eyes:      Extraocular Movements: Extraocular movements intact.      Conjunctiva/sclera: Conjunctivae normal.      Pupils: Pupils are equal, round, and reactive to light.   Cardiovascular:      Rate and Rhythm: Normal rate and regular rhythm.      Pulses: Normal pulses.      Heart sounds: Normal heart sounds.   Pulmonary:      Effort: Pulmonary effort is normal.      Breath sounds: Normal breath sounds.   Abdominal:      General: Abdomen is flat. Bowel sounds are normal.      Palpations: Abdomen is soft.   Musculoskeletal:         General: Normal range of motion.      Cervical back: Normal range of motion and neck supple.      Right lower leg: No edema.      Left lower leg: No edema.   Skin:     General: Skin is warm and dry.   Neurological:      General: No focal deficit present.       Mental Status: He is alert and oriented to person, place, and time. Mental status is at baseline.      Cranial Nerves: Cranial nerves 2-12 are intact.      Sensory: Sensation is intact.      Motor: Motor function is intact.   Psychiatric:         Mood and Affect: Mood normal.         Behavior: Behavior normal. Behavior is cooperative.         Thought Content: Thought content normal.         Judgment: Judgment normal.            Pertinent  and/or Most Recent Results     LAB RESULTS:      Lab 05/31/24  0558 05/30/24  0354 05/29/24  0734 05/29/24  0040   WBC 11.13* 12.95* 16.02* 18.60*   HEMOGLOBIN 13.0 11.7* 11.5* 13.5   HEMATOCRIT 41.1 38.0 36.3* 42.6   PLATELETS 343 271 261 313   NEUTROS ABS 7.27* 9.26* 12.36* 14.65*   IMMATURE GRANS (ABS) 0.23* 0.11* 0.11* 0.11*   LYMPHS ABS 2.07 2.01 2.03 2.25   MONOS ABS 0.97* 1.14* 1.37* 1.44*   EOS ABS 0.48* 0.33 0.05 0.05   MCV 94.7 96.9 94.8 94.9   PROCALCITONIN  --   --   --  0.08         Lab 05/31/24  0558 05/30/24  0354 05/29/24  0734 05/29/24  0040   SODIUM 138 135* 134* 135*   POTASSIUM 3.4* 3.5 3.6 3.8   CHLORIDE 104 105 103 98   CO2 24.9 21.3* 21.3* 24.5   ANION GAP 9.1 8.7 9.7 12.5   BUN 9 13 15 17   CREATININE 0.86 0.77 0.86 1.15   EGFR 83.3 86.1 83.3 61.2   GLUCOSE 93 90 95 119*   CALCIUM 8.4* 8.3* 8.2* 9.3         Lab 05/29/24  0040   TOTAL PROTEIN 7.9   ALBUMIN 4.0   GLOBULIN 3.9   ALT (SGPT) 9   AST (SGOT) 19   BILIRUBIN 0.6   ALK PHOS 86   LIPASE 20                     Brief Urine Lab Results  (Last result in the past 365 days)        Color   Clarity   Blood   Leuk Est   Nitrite   Protein   CREAT   Urine HCG        05/29/24 0411 Orange  Comment: Any Substance that causes an abnormal urine color can alter the accuracy of the chemical reactions.   Cloudy   Negative   Moderate (2+)   Negative   30 mg/dL (1+)                 Microbiology Results (last 10 days)       Procedure Component Value - Date/Time    Clostridioides difficile Toxin - Stool, Per Rectum  [[id number]]  (Normal) Collected: 05/30/24 7612    Lab Status: Final result Specimen: Stool from Per Rectum Updated: 05/31/24 0643    Narrative:      The following orders were created for panel order Clostridioides difficile Toxin - Stool, Per Rectum.  Procedure                               Abnormality         Status                     ---------                               -----------         ------                     Clostridioides difficile...[[id number]]  Normal              Final result                 Please view results for these tests on the individual orders.    Gastrointestinal Panel, PCR - Stool, Per Rectum [[id number]]  (Abnormal) Collected: 05/30/24 4798    Lab Status: Final result Specimen: Stool from Per Rectum Updated: 05/30/24 1757     Campylobacter Detected     Plesiomonas shigelloides Not Detected     Salmonella Not Detected     Vibrio Not Detected     Vibrio cholerae Not Detected     Yersinia enterocolitica Not Detected     Enteroaggregative E. coli (EAEC) Not Detected     Enterotoxigenic E. coli (ETEC) lt/st Not Detected     Shiga-like toxin-producing E. coli (STEC) stx1/stx2 Detected     E. coli O157 Not Detected     Shigella/Enteroinvasive E. coli (EIEC) Not Detected     Cryptosporidium Not Detected     Cyclospora cayetanensis Not Detected     Entamoeba histolytica Not Detected     Giardia lamblia Not Detected     Adenovirus F40/41 Not Detected     Astrovirus Not Detected     Norovirus GI/GII Detected     Comment: If a positive Norovirus result is inconsistent with clinical presentation, the positive Norovirus result should be confirmed using another method.        Rotavirus A Not Detected     Sapovirus (I, II, IV or V) Not Detected    Clostridioides difficile EIA - Stool, Per Rectum [[id number]]  (Normal) Collected: 05/30/24 7038    Lab Status: Final result Specimen: Stool from Per Rectum Updated: 05/31/24 0643     C Diff GDH Ag Negative     C.diff Toxin Ag Negative    Narrative:      The  result indicates the absence of toxigenic C.difficile from stool specimen.    COVID-19, FLU A/B, RSV PCR 1 HR TAT - Swab, Nasopharynx [[id number]]  (Normal) Collected: 05/29/24 1442    Lab Status: Final result Specimen: Swab from Nasopharynx Updated: 05/29/24 1542     COVID19 Not Detected     Influenza A PCR Not Detected     Influenza B PCR Not Detected     RSV, PCR Not Detected    Narrative:      Fact sheet for providers: https://www.fda.gov/media/688514/download    Fact sheet for patients: https://www.fda.gov/media/582562/download    Test performed by PCR.            CT Abdomen Pelvis With Contrast    Result Date: 5/29/2024  Impression: Impression: 1. Pancolitis, which is most likely infectious or inflammatory. 2. Large ventral hernia containing a portion of the transverse colon and small bowel. No bowel obstruction. 3. Evidence of prior left inguinal hernia repair. 4. Cholelithiasis without evidence of acute cholecystitis. Electronically Signed: Jose Manuel Mcmahan MD  5/29/2024 3:55 AM EDT  Workstation ID: [id number]                 Labs Pending at Discharge:  Pending Labs       Order Current Status    Urine Culture - Urine, Urine, Clean Catch In process            Procedures Performed           Consults:   Consults       Date and Time Order Name Status Description    5/29/2024  4:09 AM Inpatient Hospitalist Consult                Discharge Details        Discharge Medications        New Medications        Instructions Start Date   ciprofloxacin 500 MG tablet  Commonly known as: Cipro   500 mg, Oral, 2 Times Daily             Continue These Medications        Instructions Start Date   aspirin 81 MG chewable tablet   81 mg, Oral, Daily      EQ Allergy Relief 10 MG tablet  Generic drug: loratadine   10 mg, Oral, Daily      fluticasone 50 MCG/ACT nasal spray  Commonly known as: FLONASE   2 sprays, Nasal, Daily      levothyroxine 112 MCG tablet  Commonly known as: SYNTHROID, LEVOTHROID   112 mcg, Oral, Daily                No Known Allergies      Discharge Disposition:   Home or Self Care    Diet:  Hospital:  Diet Order   Procedures    Diet: Gastrointestinal; Fat-Restricted; Fluid Consistency: Thin (IDDSI 0)         Discharge Activity:         CODE STATUS:  Code Status and Medical Interventions:   Ordered at: 05/29/24 0533     Code Status (Patient has no pulse and is not breathing):    CPR (Attempt to Resuscitate)     Medical Interventions (Patient has pulse or is breathing):    Full Support         Future Appointments   Date Time Provider Department Center   6/3/2024  2:15 PM Radha Greene APRN [facility]   8/29/2024  8:00 AM Radha Greene APRN [facility]           Time spent on Discharge including face to face service:  >30 minutes    Signature: Electronically signed by Deepak Jeffries MD, 05/31/24, 08:26 EDT.  [facility] Hospitalist Team

## 2024-05-31 NOTE — PLAN OF CARE
Goal Outcome Evaluation:  Plan of Care Reviewed With: patient        Progress: no change  Outcome Evaluation: Patient is discharging home with son, Farhat, via private vehicle. Will be going on PO ciprofloaxin BID for 10 days. Education complete.

## 2024-06-02 LAB — BACTERIA SPEC AEROBE CULT: ABNORMAL

## 2024-06-03 ENCOUNTER — TRANSITIONAL CARE MANAGEMENT TELEPHONE ENCOUNTER (OUTPATIENT)
Dept: CALL CENTER | Facility: HOSPITAL | Age: 89
End: 2024-06-03
Payer: MEDICARE

## 2024-06-03 ENCOUNTER — OFFICE VISIT (OUTPATIENT)
Dept: FAMILY MEDICINE CLINIC | Facility: CLINIC | Age: 89
End: 2024-06-03
Payer: MEDICARE

## 2024-06-03 VITALS
DIASTOLIC BLOOD PRESSURE: 60 MMHG | WEIGHT: 158 LBS | SYSTOLIC BLOOD PRESSURE: 120 MMHG | BODY MASS INDEX: 23.95 KG/M2 | TEMPERATURE: 97.5 F | HEIGHT: 68 IN | HEART RATE: 84 BPM | OXYGEN SATURATION: 98 %

## 2024-06-03 DIAGNOSIS — E87.6 HYPOKALEMIA: Primary | ICD-10-CM

## 2024-06-03 DIAGNOSIS — A09 DIARRHEA OF INFECTIOUS ORIGIN: ICD-10-CM

## 2024-06-03 DIAGNOSIS — K43.6 INCARCERATED VENTRAL HERNIA: Chronic | ICD-10-CM

## 2024-06-03 DIAGNOSIS — E03.9 ACQUIRED HYPOTHYROIDISM: Chronic | ICD-10-CM

## 2024-06-03 PROBLEM — R19.7 DIARRHEA: Status: RESOLVED | Noted: 2024-05-29 | Resolved: 2024-06-03

## 2024-06-03 PROCEDURE — 1126F AMNT PAIN NOTED NONE PRSNT: CPT | Performed by: NURSE PRACTITIONER

## 2024-06-03 PROCEDURE — 99213 OFFICE O/P EST LOW 20 MIN: CPT | Performed by: NURSE PRACTITIONER

## 2024-06-03 RX ORDER — CETIRIZINE HYDROCHLORIDE 10 MG/1
10 TABLET ORAL DAILY
Qty: 60 TABLET | Refills: 1 | Status: SHIPPED | OUTPATIENT
Start: 2024-06-03

## 2024-06-03 NOTE — PATIENT INSTRUCTIONS
Blood work  Report any return of abdominal pain or diarrhea  Avoid lifting  Eat bananas 3 times a week  Recheck BMP in 1 month

## 2024-06-03 NOTE — OUTREACH NOTE
Call Center TCM Note      Flowsheet Row Responses   [facility] in [place] patient discharged from? [facility]   Does the patient have one of the following disease processes/diagnoses(primary or secondary)? Other   TCM attempt successful? Yes   Call start time 1237   Call end time 1238   List who call center can speak with pt   Comments Pcp hospital f/u this pm.   Does the patient have an appointment with their PCP within 7-14 days of discharge? Yes   TCM call completed? Yes   Wrap up additional comments Pt reports feeling well. Brief call.   Call end time 1238   Would this patient benefit from a Referral to Amb Social Work? No   Is the patient interested in additional calls from an ambulatory ? No            Janeth Fish, RN    6/3/2024, 12:40 EDT

## 2024-06-03 NOTE — PROGRESS NOTES
"Christiano Guzmán is a 89 y.o. male.     History of Present Illness  89-year-old white male with history hypertension, BPH, hypothyroidism, incarcerated hernia and insomnia who comes in today for follow-up posthospitalization for nausea vomiting diarrhea.    Blood pressure 120/60 heart rate 84 he denies any chest pain, dyspnea, tachycardia or dizziness    Patient currently on Cipro apparently had bowel infection not quite sure the source.  He states the diarrhea has stopped now and he is back to eating normally    Patient has a very large bowel containing ventral hernia that is nonobstructing at this point however I did advise patient to limit lifting.  I am not sure that they would do surgery at his age unless it did become obstructed.  He wears a abdominal binder over the hernia.  CT also suggested diverticulosis and I reviewed food restrictions for that    Patient potassium was low at 3.4 told him to eat bananas 3 times a week and we will recheck it in 1 month.  He has had 2 COVID vaccines is up-to-date on eye exam.  Weight is 158 with a BMI of 24.0                Blood work  Report any return of abdominal pain or diarrhea  Avoid lifting  Eat bananas 3 times a week  Recheck BMP in 1 month       The following portions of the patient's history were reviewed and updated as appropriate: allergies, current medications, past family history, past medical history, past social history, past surgical history, and problem list.    Vitals:    06/03/24 1358   BP: 120/60   BP Location: Right arm   Patient Position: Sitting   Cuff Size: Adult   Pulse: 84   Temp: 97.5 °F (36.4 °C)   TempSrc: Infrared   SpO2: 98%   Weight: 71.7 kg (158 lb)   Height: 172.7 cm (67.99\")     Body mass index is 24.03 kg/m².    Past Medical History:   Diagnosis Date    Disease of thyroid gland     Hypothyroidism      Past Surgical History:   Procedure Laterality Date    APPENDECTOMY      CYSTOSCOPY N/A 10/22/2019    Procedure: CYSTOSCOPY FLEXIBLE " AT BEDSIDE WITH MCGARRY CATHETER PLACEMENT;  Surgeon: Mynor Amor MD;  Location: [facility] MAIN OR;  Service: Urology    HERNIA REPAIR      multiple repairs to hernia, pt states 7    VENTRAL/INCISIONAL HERNIA REPAIR N/A 10/22/2019    Procedure: EXPLORATORY LAPAROTOMY, EXTENSIVE LYSIS OF ADHESIONS, EXPLANT OF PREVIOUS  HERNIA MESH, BILATERAL SUBCUTANEOUS SKIN FLAPS, RIGHT SIDED EXTERNAL OBLIQUE RELEASE, VENTRAL  HERNIA REPAIR WITH MESH UNDERLAY AND SMALL BOWEL RESECTION WITH PRIMARY ANASTOMOSIS;  Surgeon: Poncho Nelson MD;  Location: [facility] MAIN OR;  Service: General     Family History   Problem Relation Age of Onset    Cancer Brother     Diabetes Son      Immunization History   Administered Date(s) Administered    COVID-19 (MODERNA) 1st,2nd,3rd Dose Monovalent 02/03/2021, 03/03/2021    COVID-19 (PFIZER) Purple Cap Monovalent 02/04/2021    Fluzone High-Dose 65+yrs 10/13/2022    Pneumococcal Conjugate 20-Valent (PCV20) 07/27/2023    Pneumococcal Polysaccharide (PPSV23) 08/26/2021       Admission on 05/29/2024, Discharged on 05/31/2024   Component Date Value Ref Range Status    Glucose 05/29/2024 119 (H)  65 - 99 mg/dL Final    BUN 05/29/2024 17  8 - 23 mg/dL Final    Creatinine 05/29/2024 1.15  0.76 - 1.27 mg/dL Final    Sodium 05/29/2024 135 (L)  136 - 145 mmol/L Final    Potassium 05/29/2024 3.8  3.5 - 5.2 mmol/L Final    Chloride 05/29/2024 98  98 - 107 mmol/L Final    CO2 05/29/2024 24.5  22.0 - 29.0 mmol/L Final    Calcium 05/29/2024 9.3  8.6 - 10.5 mg/dL Final    Total Protein 05/29/2024 7.9  6.0 - 8.5 g/dL Final    Albumin 05/29/2024 4.0  3.5 - 5.2 g/dL Final    ALT (SGPT) 05/29/2024 9  1 - 41 U/L Final    AST (SGOT) 05/29/2024 19  1 - 40 U/L Final    Alkaline Phosphatase 05/29/2024 86  39 - 117 U/L Final    Total Bilirubin 05/29/2024 0.6  0.0 - 1.2 mg/dL Final    Globulin 05/29/2024 3.9  gm/dL Final    A/G Ratio 05/29/2024 1.0  g/dL Final    BUN/Creatinine Ratio 05/29/2024 14.8  7.0 - 25.0 Final    Anion  Gap 05/29/2024 12.5  5.0 - 15.0 mmol/L Final    eGFR 05/29/2024 61.2  >60.0 mL/min/1.73 Final    Lipase 05/29/2024 20  13 - 60 U/L Final    Color, UA 05/29/2024 Orange (A)  Yellow, Straw Final    Any Substance that causes an abnormal urine color can alter the accuracy of the chemical reactions.    Appearance, UA 05/29/2024 Cloudy (A)  Clear Final    pH, UA 05/29/2024 5.5  5.0 - 8.0 Final    Specific Gravity, UA 05/29/2024 1.046 (H)  1.005 - 1.030 Final    Glucose, UA 05/29/2024 Negative  Negative Final    Ketones, UA 05/29/2024 Negative  Negative Final    Bilirubin, UA 05/29/2024 Negative  Negative Final    Blood, UA 05/29/2024 Negative  Negative Final    Protein, UA 05/29/2024 30 mg/dL (1+) (A)  Negative Final    Leuk Esterase, UA 05/29/2024 Moderate (2+) (A)  Negative Final    Nitrite, UA 05/29/2024 Negative  Negative Final    Urobilinogen, UA 05/29/2024 1.0 E.U./dL  0.2 - 1.0 E.U./dL Final    WBC 05/29/2024 18.60 (H)  3.40 - 10.80 10*3/mm3 Final    RBC 05/29/2024 4.49  4.14 - 5.80 10*6/mm3 Final    Hemoglobin 05/29/2024 13.5  13.0 - 17.7 g/dL Final    Hematocrit 05/29/2024 42.6  37.5 - 51.0 % Final    MCV 05/29/2024 94.9  79.0 - 97.0 fL Final    MCH 05/29/2024 30.1  26.6 - 33.0 pg Final    MCHC 05/29/2024 31.7  31.5 - 35.7 g/dL Final    RDW 05/29/2024 13.4  12.3 - 15.4 % Final    RDW-SD 05/29/2024 47.0  37.0 - 54.0 fl Final    MPV 05/29/2024 10.9  6.0 - 12.0 fL Final    Platelets 05/29/2024 313  140 - 450 10*3/mm3 Final    Neutrophil % 05/29/2024 78.8 (H)  42.7 - 76.0 % Final    Lymphocyte % 05/29/2024 12.1 (L)  19.6 - 45.3 % Final    Monocyte % 05/29/2024 7.7  5.0 - 12.0 % Final    Eosinophil % 05/29/2024 0.3  0.3 - 6.2 % Final    Basophil % 05/29/2024 0.5  0.0 - 1.5 % Final    Immature Grans % 05/29/2024 0.6 (H)  0.0 - 0.5 % Final    Neutrophils, Absolute 05/29/2024 14.65 (H)  1.70 - 7.00 10*3/mm3 Final    Lymphocytes, Absolute 05/29/2024 2.25  0.70 - 3.10 10*3/mm3 Final    Monocytes, Absolute 05/29/2024 1.44  (H)  0.10 - 0.90 10*3/mm3 Final    Eosinophils, Absolute 05/29/2024 0.05  0.00 - 0.40 10*3/mm3 Final    Basophils, Absolute 05/29/2024 0.10  0.00 - 0.20 10*3/mm3 Final    Immature Grans, Absolute 05/29/2024 0.11 (H)  0.00 - 0.05 10*3/mm3 Final    nRBC 05/29/2024 0.0  0.0 - 0.2 /100 WBC Final    Procalcitonin 05/29/2024 0.08  0.00 - 0.25 ng/mL Final    RBC, UA 05/29/2024 None Seen  None Seen, 0-2 /HPF Final    WBC, UA 05/29/2024 21-50 (A)  None Seen, 0-2 /HPF Final    Bacteria, UA 05/29/2024 4+ (A)  None Seen /HPF Final    Squamous Epithelial Cells, UA 05/29/2024 3-6 (A)  None Seen, 0-2 /HPF Final    Hyaline Casts, UA 05/29/2024 0-2  None Seen /LPF Final    Methodology 05/29/2024 Manual Light Microscopy   Final    Glucose 05/29/2024 95  65 - 99 mg/dL Final    BUN 05/29/2024 15  8 - 23 mg/dL Final    Creatinine 05/29/2024 0.86  0.76 - 1.27 mg/dL Final    Sodium 05/29/2024 134 (L)  136 - 145 mmol/L Final    Potassium 05/29/2024 3.6  3.5 - 5.2 mmol/L Final    Chloride 05/29/2024 103  98 - 107 mmol/L Final    CO2 05/29/2024 21.3 (L)  22.0 - 29.0 mmol/L Final    Calcium 05/29/2024 8.2 (L)  8.6 - 10.5 mg/dL Final    BUN/Creatinine Ratio 05/29/2024 17.4  7.0 - 25.0 Final    Anion Gap 05/29/2024 9.7  5.0 - 15.0 mmol/L Final    eGFR 05/29/2024 83.3  >60.0 mL/min/1.73 Final    WBC 05/29/2024 16.02 (H)  3.40 - 10.80 10*3/mm3 Final    RBC 05/29/2024 3.83 (L)  4.14 - 5.80 10*6/mm3 Final    Hemoglobin 05/29/2024 11.5 (L)  13.0 - 17.7 g/dL Final    Hematocrit 05/29/2024 36.3 (L)  37.5 - 51.0 % Final    MCV 05/29/2024 94.8  79.0 - 97.0 fL Final    MCH 05/29/2024 30.0  26.6 - 33.0 pg Final    MCHC 05/29/2024 31.7  31.5 - 35.7 g/dL Final    RDW 05/29/2024 13.4  12.3 - 15.4 % Final    RDW-SD 05/29/2024 46.3  37.0 - 54.0 fl Final    MPV 05/29/2024 10.9  6.0 - 12.0 fL Final    Platelets 05/29/2024 261  140 - 450 10*3/mm3 Final    Neutrophil % 05/29/2024 77.1 (H)  42.7 - 76.0 % Final    Lymphocyte % 05/29/2024 12.7 (L)  19.6 - 45.3 %  Final    Monocyte % 05/29/2024 8.6  5.0 - 12.0 % Final    Eosinophil % 05/29/2024 0.3  0.3 - 6.2 % Final    Basophil % 05/29/2024 0.6  0.0 - 1.5 % Final    Immature Grans % 05/29/2024 0.7 (H)  0.0 - 0.5 % Final    Neutrophils, Absolute 05/29/2024 12.36 (H)  1.70 - 7.00 10*3/mm3 Final    Lymphocytes, Absolute 05/29/2024 2.03  0.70 - 3.10 10*3/mm3 Final    Monocytes, Absolute 05/29/2024 1.37 (H)  0.10 - 0.90 10*3/mm3 Final    Eosinophils, Absolute 05/29/2024 0.05  0.00 - 0.40 10*3/mm3 Final    Basophils, Absolute 05/29/2024 0.10  0.00 - 0.20 10*3/mm3 Final    Immature Grans, Absolute 05/29/2024 0.11 (H)  0.00 - 0.05 10*3/mm3 Final    nRBC 05/29/2024 0.0  0.0 - 0.2 /100 WBC Final    COVID19 05/29/2024 Not Detected  Not Detected - Ref. Range Final    Influenza A PCR 05/29/2024 Not Detected  Not Detected Final    Influenza B PCR 05/29/2024 Not Detected  Not Detected Final    RSV, PCR 05/29/2024 Not Detected  Not Detected Final    Glucose 05/30/2024 90  65 - 99 mg/dL Final    BUN 05/30/2024 13  8 - 23 mg/dL Final    Creatinine 05/30/2024 0.77  0.76 - 1.27 mg/dL Final    Sodium 05/30/2024 135 (L)  136 - 145 mmol/L Final    Potassium 05/30/2024 3.5  3.5 - 5.2 mmol/L Final    Chloride 05/30/2024 105  98 - 107 mmol/L Final    CO2 05/30/2024 21.3 (L)  22.0 - 29.0 mmol/L Final    Calcium 05/30/2024 8.3 (L)  8.6 - 10.5 mg/dL Final    BUN/Creatinine Ratio 05/30/2024 16.9  7.0 - 25.0 Final    Anion Gap 05/30/2024 8.7  5.0 - 15.0 mmol/L Final    eGFR 05/30/2024 86.1  >60.0 mL/min/1.73 Final    WBC 05/30/2024 12.95 (H)  3.40 - 10.80 10*3/mm3 Final    RBC 05/30/2024 3.92 (L)  4.14 - 5.80 10*6/mm3 Final    Hemoglobin 05/30/2024 11.7 (L)  13.0 - 17.7 g/dL Final    Hematocrit 05/30/2024 38.0  37.5 - 51.0 % Final    MCV 05/30/2024 96.9  79.0 - 97.0 fL Final    MCH 05/30/2024 29.8  26.6 - 33.0 pg Final    MCHC 05/30/2024 30.8 (L)  31.5 - 35.7 g/dL Final    RDW 05/30/2024 13.2  12.3 - 15.4 % Final    RDW-SD 05/30/2024 47.8  37.0 - 54.0 fl  Final    MPV 05/30/2024 10.6  6.0 - 12.0 fL Final    Platelets 05/30/2024 271  140 - 450 10*3/mm3 Final    Neutrophil % 05/30/2024 71.6  42.7 - 76.0 % Final    Lymphocyte % 05/30/2024 15.5 (L)  19.6 - 45.3 % Final    Monocyte % 05/30/2024 8.8  5.0 - 12.0 % Final    Eosinophil % 05/30/2024 2.5  0.3 - 6.2 % Final    Basophil % 05/30/2024 0.8  0.0 - 1.5 % Final    Immature Grans % 05/30/2024 0.8 (H)  0.0 - 0.5 % Final    Neutrophils, Absolute 05/30/2024 9.26 (H)  1.70 - 7.00 10*3/mm3 Final    Lymphocytes, Absolute 05/30/2024 2.01  0.70 - 3.10 10*3/mm3 Final    Monocytes, Absolute 05/30/2024 1.14 (H)  0.10 - 0.90 10*3/mm3 Final    Eosinophils, Absolute 05/30/2024 0.33  0.00 - 0.40 10*3/mm3 Final    Basophils, Absolute 05/30/2024 0.10  0.00 - 0.20 10*3/mm3 Final    Immature Grans, Absolute 05/30/2024 0.11 (H)  0.00 - 0.05 10*3/mm3 Final    nRBC 05/30/2024 0.0  0.0 - 0.2 /100 WBC Final    Urine Culture 05/29/2024 >100,000 CFU/mL Aerococcus urinae (A)   Final      Aerococcus spp. are usually susceptible to beta-lactams and vancomycin. Resistance has been reported to the fluoroquinolones. Routine susceptibility testing is not recommended. Please call lab to request further workup.    Campylobacter 05/30/2024 Detected (A)  Not Detected Final    Plesiomonas shigelloides 05/30/2024 Not Detected  Not Detected Final    Salmonella 05/30/2024 Not Detected  Not Detected Final    Vibrio 05/30/2024 Not Detected  Not Detected Final    Vibrio cholerae 05/30/2024 Not Detected  Not Detected Final    Yersinia enterocolitica 05/30/2024 Not Detected  Not Detected Final    Enteroaggregative E. coli (EAEC) 05/30/2024 Not Detected  Not Detected Final    Enterotoxigenic E. coli (ETEC) lt/* 05/30/2024 Not Detected  Not Detected Final    Shiga-like toxin-producing E. coli* 05/30/2024 Detected (A)  Not Detected Final    E. coli O157 05/30/2024 Not Detected  Not Detected Final    Shigella/Enteroinvasive E. coli (E* 05/30/2024 Not Detected  Not  Detected Final    Cryptosporidium 05/30/2024 Not Detected  Not Detected Final    Cyclospora cayetanensis 05/30/2024 Not Detected  Not Detected Final    Entamoeba histolytica 05/30/2024 Not Detected  Not Detected Final    Giardia lamblia 05/30/2024 Not Detected  Not Detected Final    Adenovirus F40/41 05/30/2024 Not Detected  Not Detected Final    Astrovirus 05/30/2024 Not Detected  Not Detected Final    Norovirus GI/GII 05/30/2024 Detected (A)  Not Detected Final    If a positive Norovirus result is inconsistent with clinical presentation, the positive Norovirus result should be confirmed using another method.    Rotavirus A 05/30/2024 Not Detected  Not Detected Final    Sapovirus (I, II, IV or V) 05/30/2024 Not Detected  Not Detected Final    C Diff GDH Ag 05/30/2024 Negative  Negative Final    C.diff Toxin Ag 05/30/2024 Negative  Negative Final    QT Interval 05/30/2024 406  ms Preliminary    QTC Interval 05/30/2024 427  ms Preliminary    Glucose 05/31/2024 93  65 - 99 mg/dL Final    BUN 05/31/2024 9  8 - 23 mg/dL Final    Creatinine 05/31/2024 0.86  0.76 - 1.27 mg/dL Final    Sodium 05/31/2024 138  136 - 145 mmol/L Final    Potassium 05/31/2024 3.4 (L)  3.5 - 5.2 mmol/L Final    Chloride 05/31/2024 104  98 - 107 mmol/L Final    CO2 05/31/2024 24.9  22.0 - 29.0 mmol/L Final    Calcium 05/31/2024 8.4 (L)  8.6 - 10.5 mg/dL Final    BUN/Creatinine Ratio 05/31/2024 10.5  7.0 - 25.0 Final    Anion Gap 05/31/2024 9.1  5.0 - 15.0 mmol/L Final    eGFR 05/31/2024 83.3  >60.0 mL/min/1.73 Final    WBC 05/31/2024 11.13 (H)  3.40 - 10.80 10*3/mm3 Final    RBC 05/31/2024 4.34  4.14 - 5.80 10*6/mm3 Final    Hemoglobin 05/31/2024 13.0  13.0 - 17.7 g/dL Final    Hematocrit 05/31/2024 41.1  37.5 - 51.0 % Final    MCV 05/31/2024 94.7  79.0 - 97.0 fL Final    MCH 05/31/2024 30.0  26.6 - 33.0 pg Final    MCHC 05/31/2024 31.6  31.5 - 35.7 g/dL Final    RDW 05/31/2024 13.1  12.3 - 15.4 % Final    RDW-SD 05/31/2024 45.9  37.0 - 54.0 fl  Final    MPV 05/31/2024 11.0  6.0 - 12.0 fL Final    Platelets 05/31/2024 343  140 - 450 10*3/mm3 Final    Neutrophil % 05/31/2024 65.3  42.7 - 76.0 % Final    Lymphocyte % 05/31/2024 18.6 (L)  19.6 - 45.3 % Final    Monocyte % 05/31/2024 8.7  5.0 - 12.0 % Final    Eosinophil % 05/31/2024 4.3  0.3 - 6.2 % Final    Basophil % 05/31/2024 1.0  0.0 - 1.5 % Final    Immature Grans % 05/31/2024 2.1 (H)  0.0 - 0.5 % Final    Neutrophils, Absolute 05/31/2024 7.27 (H)  1.70 - 7.00 10*3/mm3 Final    Lymphocytes, Absolute 05/31/2024 2.07  0.70 - 3.10 10*3/mm3 Final    Monocytes, Absolute 05/31/2024 0.97 (H)  0.10 - 0.90 10*3/mm3 Final    Eosinophils, Absolute 05/31/2024 0.48 (H)  0.00 - 0.40 10*3/mm3 Final    Basophils, Absolute 05/31/2024 0.11  0.00 - 0.20 10*3/mm3 Final    Immature Grans, Absolute 05/31/2024 0.23 (H)  0.00 - 0.05 10*3/mm3 Final    nRBC 05/31/2024 0.0  0.0 - 0.2 /100 WBC Final         Review of Systems   Constitutional: Negative.    HENT: Negative.     Respiratory: Negative.     Cardiovascular: Negative.    Gastrointestinal: Negative.    Genitourinary: Negative.    Musculoskeletal: Negative.    Skin: Negative.    Neurological: Negative.    Psychiatric/Behavioral: Negative.         Objective   Physical Exam  Constitutional:       Appearance: Normal appearance.   HENT:      Head: Normocephalic.   Cardiovascular:      Rate and Rhythm: Normal rate and regular rhythm.      Pulses: Normal pulses.      Heart sounds: Normal heart sounds.   Pulmonary:      Effort: Pulmonary effort is normal.      Breath sounds: Normal breath sounds.   Abdominal:      General: Bowel sounds are normal.   Musculoskeletal:      Comments: Obvious ventral hernia   Skin:     General: Skin is warm.   Neurological:      General: No focal deficit present.      Mental Status: He is alert and oriented to person, place, and time.   Psychiatric:         Mood and Affect: Mood normal.         Behavior: Behavior normal.          Procedures    Assessment & Plan   Diagnoses and all orders for this visit:    1. Hypokalemia (Primary)  -     Basic metabolic panel; Future    2. Acquired hypothyroidism  -     TSH+Free T4  -     T3    3. Incarcerated ventral hernia    4. Diarrhea of infectious origin    Other orders  -     cetirizine (ZyrTEC Allergy) 10 MG tablet; Take 1 tablet by mouth Daily.  Dispense: 60 tablet; Refill: 1           Current Outpatient Medications:     aspirin 81 MG chewable tablet, Chew 1 tablet Daily., Disp: , Rfl:     ciprofloxacin (Cipro) 500 MG tablet, Take 1 tablet by mouth 2 (Two) Times a Day for 10 days., Disp: 20 tablet, Rfl: 0    EQ Allergy Relief 10 MG tablet, Take 1 tablet by mouth once daily, Disp: 30 tablet, Rfl: 0    fluticasone (FLONASE) 50 MCG/ACT nasal spray, 2 sprays into the nostril(s) as directed by provider Daily., Disp: 16 g, Rfl: 2    levothyroxine (SYNTHROID, LEVOTHROID) 112 MCG tablet, Take 1 tablet by mouth once daily, Disp: 90 tablet, Rfl: 0    cetirizine (ZyrTEC Allergy) 10 MG tablet, Take 1 tablet by mouth Daily., Disp: 60 tablet, Rfl: 1           Radha Greene, CHRISTIAN 6/3/2024 16:15 EDT  This note has been electronically signed

## 2024-06-04 LAB
T3 SERPL-MCNC: 61 NG/DL (ref 71–180)
T4 FREE SERPL-MCNC: 1.19 NG/DL (ref 0.82–1.77)
TSH SERPL DL<=0.005 MIU/L-ACNC: 6.87 UIU/ML (ref 0.45–4.5)

## 2024-06-10 NOTE — PROGRESS NOTES
"Enter Query Response Below      Query Response: uti ruled out             If applicable, please update the problem list.     Patient: Christiano Guzmán        : 1935  Account: [id number]           Admit Date: 2024        How to Respond to this query:       a. Click New Note     b. Answer query within the yellow box.                c. Update the Problem List, if applicable.      If you have any questions about this query contact me at: [email or web address]     Dr. Jeffries    89-year-old male admitted  (OBS),  (IP) with Diarrhea secondary to Campylobacter, E. coli, as well as norovirus per the discharge summary.  progress note documents \"Urine culture added on due to concern for UTI as well\".   urine culture \">100,000 CFU/mL Aerococcus urinae Abnormal\"  Treatment: IV Rocephin (), IV Flagyl (), discharged on po Cipro  UTI is not documented on the discharge summary.     Please clarify the following:  UTI ruled in- due to  Aerococcus urinae  UTI ruled out  Other- specify______  Unable to determine    By submitting this query, we are merely seeking further clarification of documentation to accurately reflect all conditions that you are monitoring, evaluating, treating or that extend the hospitalization or utilize additional resources of care. Please utilize your independent clinical judgment when addressing the question(s) above.     This query and your response, once completed, will be entered into the legal medical record.    Sincerely,  Caridad Contreras MSN, RN, CCDS  Clinical Documentation Integrity Program     "

## 2024-08-18 RX ORDER — LEVOTHYROXINE SODIUM 112 UG/1
112 TABLET ORAL DAILY
Qty: 90 TABLET | Refills: 0 | Status: SHIPPED | OUTPATIENT
Start: 2024-08-18

## 2024-08-29 ENCOUNTER — OFFICE VISIT (OUTPATIENT)
Dept: FAMILY MEDICINE CLINIC | Facility: CLINIC | Age: 89
End: 2024-08-29
Payer: MEDICARE

## 2024-08-29 VITALS
WEIGHT: 153 LBS | BODY MASS INDEX: 23.19 KG/M2 | TEMPERATURE: 98 F | HEIGHT: 68 IN | SYSTOLIC BLOOD PRESSURE: 141 MMHG | HEART RATE: 54 BPM | OXYGEN SATURATION: 98 % | DIASTOLIC BLOOD PRESSURE: 73 MMHG

## 2024-08-29 DIAGNOSIS — E03.9 ACQUIRED HYPOTHYROIDISM: Chronic | ICD-10-CM

## 2024-08-29 DIAGNOSIS — E78.5 DYSLIPIDEMIA: Primary | ICD-10-CM

## 2024-08-29 DIAGNOSIS — Z83.3 FAMILY HISTORY OF DIABETES MELLITUS: ICD-10-CM

## 2024-08-29 DIAGNOSIS — E87.6 HYPOKALEMIA: ICD-10-CM

## 2024-08-29 PROCEDURE — 99213 OFFICE O/P EST LOW 20 MIN: CPT | Performed by: NURSE PRACTITIONER

## 2024-08-29 PROCEDURE — 1126F AMNT PAIN NOTED NONE PRSNT: CPT | Performed by: NURSE PRACTITIONER

## 2024-08-29 NOTE — PROGRESS NOTES
"Christiano Guzmán is a 89 y.o. male.     History of Present Illness  89-year-old white male with history of hypertension, BPH, hypothyroidism, incarcerated hernia and insomnia who comes in today for follow-up visit    Blood pressure 140/72 heart rate 56 he denies any chest pain, dyspnea, tachycardia or dizziness.  Patient is wanting to have his potassium level checked and his thyroid level checked today we will do that.  He is lost 5 pounds since I saw him in June with a weight of 153 and a BMI 23.3.  Daughter is going to try to get him some protein shakes to drink and encouraged him to eat more food.  He is pretty active even outside in the heat    He has had 2 COVID vaccines updated eye exam.  Does not do any other preventative maintenance due to his age            Fasting blood work  Monitor weight closely  Protein shakes 1-2 times a day  Follow-up 3 to 6 months       The following portions of the patient's history were reviewed and updated as appropriate: allergies, current medications, past family history, past medical history, past social history, past surgical history, and problem list.    Vitals:    08/29/24 0800   BP: 141/73   BP Location: Right arm   Patient Position: Sitting   Cuff Size: Adult   Pulse: 54   Temp: 98 °F (36.7 °C)   TempSrc: Infrared   SpO2: 98%   Weight: 69.4 kg (153 lb)   Height: 172.7 cm (67.99\")     Body mass index is 23.27 kg/m².  BMI is within normal parameters. No other follow-up for BMI required.       Past Medical History:   Diagnosis Date    Disease of thyroid gland     Hypothyroidism      Past Surgical History:   Procedure Laterality Date    APPENDECTOMY      CYSTOSCOPY N/A 10/22/2019    Procedure: CYSTOSCOPY FLEXIBLE AT BEDSIDE WITH MCGARRY CATHETER PLACEMENT;  Surgeon: Mynor Amor MD;  Location: [facility] MAIN OR;  Service: Urology    HERNIA REPAIR      multiple repairs to hernia, pt states 7    VENTRAL/INCISIONAL HERNIA REPAIR N/A 10/22/2019    Procedure: EXPLORATORY " LAPAROTOMY, EXTENSIVE LYSIS OF ADHESIONS, EXPLANT OF PREVIOUS  HERNIA MESH, BILATERAL SUBCUTANEOUS SKIN FLAPS, RIGHT SIDED EXTERNAL OBLIQUE RELEASE, VENTRAL  HERNIA REPAIR WITH MESH UNDERLAY AND SMALL BOWEL RESECTION WITH PRIMARY ANASTOMOSIS;  Surgeon: Poncho Neslon MD;  Location: [facility] MAIN OR;  Service: General     Family History   Problem Relation Age of Onset    Cancer Brother     Diabetes Son      Immunization History   Administered Date(s) Administered    COVID-19 (MODERNA) 1st,2nd,3rd Dose Monovalent 02/03/2021, 03/03/2021    COVID-19 (PFIZER) Purple Cap Monovalent 02/04/2021    Fluzone High-Dose 65+yrs 10/13/2022    Pneumococcal Conjugate 20-Valent (PCV20) 07/27/2023    Pneumococcal Polysaccharide (PPSV23) 08/26/2021       Results Encounter on 06/08/2024   Component Date Value Ref Range Status    Glucose 07/11/2024 91  70 - 99 mg/dL Final    BUN 07/11/2024 12  8 - 27 mg/dL Final    Creatinine 07/11/2024 0.89  0.76 - 1.27 mg/dL Final    EGFR Result 07/11/2024 82  >59 mL/min/1.73 Final    BUN/Creatinine Ratio 07/11/2024 13  10 - 24 Final    Sodium 07/11/2024 140  134 - 144 mmol/L Final    Potassium 07/11/2024 4.3  3.5 - 5.2 mmol/L Final    Chloride 07/11/2024 103  96 - 106 mmol/L Final    Total CO2 07/11/2024 26  20 - 29 mmol/L Final    Calcium 07/11/2024 9.0  8.6 - 10.2 mg/dL Final         Review of Systems   Constitutional: Negative.  Positive for unexpected weight loss.   HENT: Negative.     Respiratory: Negative.     Cardiovascular: Negative.    Gastrointestinal: Negative.    Genitourinary: Negative.    Musculoskeletal: Negative.    Skin: Negative.    Neurological: Negative.    Psychiatric/Behavioral: Negative.         Objective   Physical Exam  Constitutional:       Appearance: Normal appearance.   HENT:      Head: Normocephalic.   Cardiovascular:      Rate and Rhythm: Normal rate and regular rhythm.      Pulses: Normal pulses.      Heart sounds: Normal heart sounds.   Pulmonary:      Effort:  Pulmonary effort is normal.      Breath sounds: Normal breath sounds.   Abdominal:      General: Bowel sounds are normal.   Musculoskeletal:         General: Normal range of motion.   Skin:     General: Skin is warm.   Neurological:      General: No focal deficit present.      Mental Status: He is alert and oriented to person, place, and time.   Psychiatric:         Mood and Affect: Mood normal.         Behavior: Behavior normal.         Procedures    Assessment & Plan   Diagnoses and all orders for this visit:    1. Dyslipidemia (Primary)    2. Acquired hypothyroidism  -     TSH+Free T4  -     T3    3. Hypokalemia  -     Comprehensive Metabolic Panel    4. Family history of diabetes mellitus  -     Hemoglobin A1c    5. BMI 23.0-23.9, adult           Current Outpatient Medications:     aspirin 81 MG chewable tablet, Chew 1 tablet Daily., Disp: , Rfl:     cetirizine (ZyrTEC Allergy) 10 MG tablet, Take 1 tablet by mouth Daily., Disp: 60 tablet, Rfl: 1    EQ Allergy Relief 10 MG tablet, Take 1 tablet by mouth once daily, Disp: 30 tablet, Rfl: 0    fluticasone (FLONASE) 50 MCG/ACT nasal spray, 2 sprays into the nostril(s) as directed by provider Daily., Disp: 16 g, Rfl: 2    levothyroxine (SYNTHROID, LEVOTHROID) 112 MCG tablet, Take 1 tablet by mouth once daily, Disp: 90 tablet, Rfl: 0           CHRISTIAN Trotter 8/29/2024 08:21 EDT  This note has been electronically signed

## 2024-08-29 NOTE — PATIENT INSTRUCTIONS
Fasting blood work  Monitor weight closely  Protein shakes 1-2 times a day  Follow-up 3 to 6 months

## 2024-08-30 LAB
ALBUMIN SERPL-MCNC: 4 G/DL (ref 3.7–4.7)
ALP SERPL-CCNC: 78 IU/L (ref 44–121)
ALT SERPL-CCNC: 6 IU/L (ref 0–44)
AST SERPL-CCNC: 16 IU/L (ref 0–40)
BILIRUB SERPL-MCNC: 0.3 MG/DL (ref 0–1.2)
BUN SERPL-MCNC: 19 MG/DL (ref 8–27)
BUN/CREAT SERPL: 20 (ref 10–24)
CALCIUM SERPL-MCNC: 9.2 MG/DL (ref 8.6–10.2)
CHLORIDE SERPL-SCNC: 102 MMOL/L (ref 96–106)
CO2 SERPL-SCNC: 27 MMOL/L (ref 20–29)
CREAT SERPL-MCNC: 0.95 MG/DL (ref 0.76–1.27)
EGFRCR SERPLBLD CKD-EPI 2021: 77 ML/MIN/1.73
GLOBULIN SER CALC-MCNC: 2.5 G/DL (ref 1.5–4.5)
GLUCOSE SERPL-MCNC: 93 MG/DL (ref 70–99)
POTASSIUM SERPL-SCNC: 5 MMOL/L (ref 3.5–5.2)
PROT SERPL-MCNC: 6.5 G/DL (ref 6–8.5)
SODIUM SERPL-SCNC: 140 MMOL/L (ref 134–144)
T3 SERPL-MCNC: 93 NG/DL (ref 71–180)
T4 FREE SERPL-MCNC: 1.16 NG/DL (ref 0.82–1.77)
TSH SERPL DL<=0.005 MIU/L-ACNC: 2.33 UIU/ML (ref 0.45–4.5)

## 2024-10-02 RX ORDER — CETIRIZINE HYDROCHLORIDE 10 MG/1
10 TABLET ORAL DAILY
Qty: 60 TABLET | Refills: 0 | Status: SHIPPED | OUTPATIENT
Start: 2024-10-02

## 2024-11-17 RX ORDER — LEVOTHYROXINE SODIUM 112 UG/1
112 TABLET ORAL DAILY
Qty: 90 TABLET | Refills: 0 | Status: SHIPPED | OUTPATIENT
Start: 2024-11-17

## 2024-12-23 RX ORDER — CETIRIZINE HYDROCHLORIDE 10 MG/1
10 TABLET ORAL DAILY
Qty: 60 TABLET | Refills: 0 | Status: SHIPPED | OUTPATIENT
Start: 2024-12-23

## 2025-01-02 ENCOUNTER — OFFICE VISIT (OUTPATIENT)
Dept: FAMILY MEDICINE CLINIC | Facility: CLINIC | Age: 89
End: 2025-01-02
Payer: MEDICARE

## 2025-01-02 VITALS
HEIGHT: 68 IN | WEIGHT: 154 LBS | SYSTOLIC BLOOD PRESSURE: 118 MMHG | BODY MASS INDEX: 23.34 KG/M2 | TEMPERATURE: 97.3 F | DIASTOLIC BLOOD PRESSURE: 68 MMHG | OXYGEN SATURATION: 94 % | HEART RATE: 67 BPM

## 2025-01-02 DIAGNOSIS — R05.1 ACUTE COUGH: Primary | ICD-10-CM

## 2025-01-02 DIAGNOSIS — T78.40XD ALLERGY, SUBSEQUENT ENCOUNTER: ICD-10-CM

## 2025-01-02 DIAGNOSIS — B37.0 ORAL THRUSH: ICD-10-CM

## 2025-01-02 DIAGNOSIS — J01.00 ACUTE NON-RECURRENT MAXILLARY SINUSITIS: ICD-10-CM

## 2025-01-02 LAB
EXPIRATION DATE: NORMAL
FLUAV AG UPPER RESP QL IA.RAPID: NOT DETECTED
FLUBV AG UPPER RESP QL IA.RAPID: NOT DETECTED
INTERNAL CONTROL: NORMAL
Lab: NORMAL
SARS-COV-2 AG UPPER RESP QL IA.RAPID: NOT DETECTED

## 2025-01-02 RX ORDER — AZITHROMYCIN 250 MG/1
TABLET, FILM COATED ORAL
Qty: 6 TABLET | Refills: 0 | Status: SHIPPED | OUTPATIENT
Start: 2025-01-02

## 2025-01-02 NOTE — PROGRESS NOTES
"Christiano Guzmán is a 89 y.o. male.     History of Present Illness  89-year-old white male with history hypertension, BPH, hypothyroidism, incarcerated hernia and insomnia who comes in today for an acute visit    Blood pressure 118/68 heart rate 66 he denies any chest pain, dyspnea, tachycardia or dizziness    Patient complaining of coughing some dyspnea and sinus pressure.  On examination he has acute maxillary sinusitis along with allergy symptoms.  Lungs are clear.  Will place him on a Z-Kemal along with allergy medication advised him to wear mask outside because he is outside quite a bit.  He was negative for COVID influenza    Weight is 154 with a BMI 23.4.  He said 2 COVID vaccines up-to-date on eye exam.  Does not do any other preventative maintenance due to his age                COVID influenza test  Z-Kemal  Zyrtec and Flonase as directed  Wear mask outside           The following portions of the patient's history were reviewed and updated as appropriate: allergies, current medications, past family history, past medical history, past social history, past surgical history, and problem list.    Vitals:    01/02/25 1434   BP: 118/68   BP Location: Right arm   Patient Position: Sitting   Cuff Size: Adult   Pulse: 67   Temp: 97.3 °F (36.3 °C)   TempSrc: Infrared   SpO2: 94%   Weight: 69.9 kg (154 lb)   Height: 172.7 cm (67.99\")       Past Medical History:   Diagnosis Date    Disease of thyroid gland     Hypothyroidism      Past Surgical History:   Procedure Laterality Date    APPENDECTOMY      CYSTOSCOPY N/A 10/22/2019    Procedure: CYSTOSCOPY FLEXIBLE AT BEDSIDE WITH MCGARRY CATHETER PLACEMENT;  Surgeon: Mynor Amor MD;  Location: [facility] OR;  Service: Urology    HERNIA REPAIR      multiple repairs to hernia, pt states 7    VENTRAL/INCISIONAL HERNIA REPAIR N/A 10/22/2019    Procedure: EXPLORATORY LAPAROTOMY, EXTENSIVE LYSIS OF ADHESIONS, EXPLANT OF PREVIOUS  HERNIA MESH, BILATERAL SUBCUTANEOUS SKIN " FLAPS, RIGHT SIDED EXTERNAL OBLIQUE RELEASE, VENTRAL  HERNIA REPAIR WITH MESH UNDERLAY AND SMALL BOWEL RESECTION WITH PRIMARY ANASTOMOSIS;  Surgeon: Poncho Nelson MD;  [facility];  Service: General     Family History   Problem Relation Age of Onset    Cancer Brother     Diabetes Son      Immunization History   Administered Date(s) Administered    COVID-19 (MODERNA) 1st,2nd,3rd Dose Monovalent 02/03/2021, 03/03/2021    COVID-19 (PFIZER) Purple Cap Monovalent 02/04/2021    Fluzone High-Dose 65+yrs 10/13/2022    Pneumococcal Conjugate 20-Valent (PCV20) 07/27/2023    Pneumococcal Polysaccharide (PPSV23) 08/26/2021       Orders Only on 08/29/2024   Component Date Value Ref Range Status    Glucose 08/29/2024 93  70 - 99 mg/dL Final    BUN 08/29/2024 19  8 - 27 mg/dL Final    Creatinine 08/29/2024 0.95  0.76 - 1.27 mg/dL Final    EGFR Result 08/29/2024 77  >59 mL/min/1.73 Final    BUN/Creatinine Ratio 08/29/2024 20  10 - 24 Final    Sodium 08/29/2024 140  134 - 144 mmol/L Final    Potassium 08/29/2024 5.0  3.5 - 5.2 mmol/L Final    Chloride 08/29/2024 102  96 - 106 mmol/L Final    Total CO2 08/29/2024 27  20 - 29 mmol/L Final    Calcium 08/29/2024 9.2  8.6 - 10.2 mg/dL Final    Total Protein 08/29/2024 6.5  6.0 - 8.5 g/dL Final    Albumin 08/29/2024 4.0  3.7 - 4.7 g/dL Final    Globulin 08/29/2024 2.5  1.5 - 4.5 g/dL Final    Total Bilirubin 08/29/2024 0.3  0.0 - 1.2 mg/dL Final    Alkaline Phosphatase 08/29/2024 78  44 - 121 IU/L Final    AST (SGOT) 08/29/2024 16  0 - 40 IU/L Final    ALT (SGPT) 08/29/2024 6  0 - 44 IU/L Final    TSH 08/29/2024 2.330  0.450 - 4.500 uIU/mL Final    Free T4 08/29/2024 1.16  0.82 - 1.77 ng/dL Final    T3, Total 08/29/2024 93  71 - 180 ng/dL Final         Review of Systems   Constitutional: Negative.    HENT: Negative.  Positive for congestion.    Respiratory: Negative.  Positive for cough.    Cardiovascular: Negative.    Gastrointestinal: Negative.    Genitourinary:  Negative.    Musculoskeletal: Negative.    Skin: Negative.    Neurological: Negative.    Psychiatric/Behavioral: Negative.         Objective   Physical Exam  Constitutional:       Appearance: Normal appearance.   HENT:      Head: Normocephalic.      Nose:      Comments: Turbinates red     Mouth/Throat:      Comments: Heavy postnasal drip and thrush on tongue  Cardiovascular:      Rate and Rhythm: Normal rate and regular rhythm.      Pulses: Normal pulses.      Heart sounds: Normal heart sounds.   Pulmonary:      Effort: Pulmonary effort is normal.   Abdominal:      General: Bowel sounds are normal.   Musculoskeletal:         General: Normal range of motion.   Skin:     General: Skin is warm.   Neurological:      General: No focal deficit present.      Mental Status: He is alert and oriented to person, place, and time.   Psychiatric:         Mood and Affect: Mood normal.         Behavior: Behavior normal.         Procedures    Assessment & Plan   Diagnoses and all orders for this visit:    1. Acute cough (Primary)  -     POCT SARS-CoV-2 + Flu Antigen MARITZA           Current Outpatient Medications:     aspirin 81 MG chewable tablet, Chew 1 tablet Daily., Disp: , Rfl:     cetirizine (zyrTEC) 10 MG tablet, Take 1 tablet by mouth once daily, Disp: 60 tablet, Rfl: 0    EQ Allergy Relief 10 MG tablet, Take 1 tablet by mouth once daily, Disp: 30 tablet, Rfl: 0    fluticasone (FLONASE) 50 MCG/ACT nasal spray, 2 sprays into the nostril(s) as directed by provider Daily., Disp: 16 g, Rfl: 2    levothyroxine (SYNTHROID, LEVOTHROID) 112 MCG tablet, Take 1 tablet by mouth once daily, Disp: 90 tablet, Rfl: 0           CHRISTIAN Trotter 1/2/2025 14:51 EST  This note has been electronically signed

## 2025-02-17 RX ORDER — LEVOTHYROXINE SODIUM 112 UG/1
112 TABLET ORAL DAILY
Qty: 90 TABLET | Refills: 0 | Status: SHIPPED | OUTPATIENT
Start: 2025-02-17

## 2025-02-24 RX ORDER — CETIRIZINE HYDROCHLORIDE 10 MG/1
10 TABLET ORAL DAILY
Qty: 60 TABLET | Refills: 0 | Status: SHIPPED | OUTPATIENT
Start: 2025-02-24

## 2025-04-29 RX ORDER — CETIRIZINE HYDROCHLORIDE 10 MG/1
10 TABLET ORAL DAILY
Qty: 60 TABLET | Refills: 0 | Status: SHIPPED | OUTPATIENT
Start: 2025-04-29

## 2025-05-20 RX ORDER — LEVOTHYROXINE SODIUM 112 UG/1
112 TABLET ORAL DAILY
Qty: 90 TABLET | Refills: 0 | Status: SHIPPED | OUTPATIENT
Start: 2025-05-20

## 2025-06-19 ENCOUNTER — OFFICE VISIT (OUTPATIENT)
Dept: FAMILY MEDICINE CLINIC | Facility: CLINIC | Age: 89
End: 2025-06-19
Payer: MEDICARE

## 2025-06-19 VITALS
OXYGEN SATURATION: 98 % | HEIGHT: 68 IN | HEART RATE: 75 BPM | RESPIRATION RATE: 16 BRPM | BODY MASS INDEX: 22.07 KG/M2 | TEMPERATURE: 97.3 F | DIASTOLIC BLOOD PRESSURE: 62 MMHG | WEIGHT: 145.6 LBS | SYSTOLIC BLOOD PRESSURE: 102 MMHG

## 2025-06-19 DIAGNOSIS — E03.9 ACQUIRED HYPOTHYROIDISM: Primary | Chronic | ICD-10-CM

## 2025-06-19 DIAGNOSIS — R79.89 ABNORMAL CBC: ICD-10-CM

## 2025-06-19 DIAGNOSIS — R73.09 ELEVATED GLUCOSE: ICD-10-CM

## 2025-06-19 DIAGNOSIS — Z13.220 LIPID SCREENING: ICD-10-CM

## 2025-06-19 RX ORDER — CETIRIZINE HYDROCHLORIDE 10 MG/1
10 TABLET ORAL DAILY
Qty: 60 TABLET | Refills: 0 | Status: SHIPPED | OUTPATIENT
Start: 2025-06-19

## 2025-06-19 RX ORDER — FLUTICASONE PROPIONATE 50 MCG
2 SPRAY, SUSPENSION (ML) NASAL DAILY
Qty: 16 G | Refills: 2 | Status: SHIPPED | OUTPATIENT
Start: 2025-06-19

## 2025-06-19 NOTE — PATIENT INSTRUCTIONS
Fasting blood work  Monitor weight at home  Be careful out in heat and humidity  Follow-up 6 months

## 2025-06-19 NOTE — PROGRESS NOTES
Subjective   The ABCs of the Annual Wellness Visit  Medicare Wellness Visit      Christiano Guzmán is a 89 y.o. patient who presents for a Medicare Wellness Visit.    The following portions of the patient's history were reviewed and   updated as appropriate: allergies, current medications, past medical history, past social history, past surgical history, and problem list.    Compared to one year ago, the patient's physical   health is the same.  Compared to one year ago, the patient's mental   health is the same.    Recent Hospitalizations:  He was not admitted to the hospital during the last year.     Current Medical Providers:  Patient Care Team:  Radha Greene APRN as PCP - General (Nurse Practitioner)    Outpatient Medications Prior to Visit   Medication Sig Dispense Refill    aspirin 81 MG chewable tablet Chew 1 tablet Daily.      EQ Allergy Relief 10 MG tablet Take 1 tablet by mouth once daily 30 tablet 0    levothyroxine (SYNTHROID, LEVOTHROID) 112 MCG tablet Take 1 tablet by mouth once daily 90 tablet 0    azithromycin (Zithromax Z-Kemal) 250 MG tablet Take 2 tablets the first day, then 1 tablet daily for 4 days. 6 tablet 0    cetirizine (zyrTEC) 10 MG tablet Take 1 tablet by mouth once daily 60 tablet 0    fluticasone (FLONASE) 50 MCG/ACT nasal spray 2 sprays into the nostril(s) as directed by provider Daily. 16 g 2     No facility-administered medications prior to visit.     No opioid medication identified on active medication list. I have reviewed chart for other potential  high risk medication/s and harmful drug interactions in the elderly.      Aspirin is on active medication list. Aspirin use is not indicated based on review of current medical condition/s. Risk of harm outweighs potential benefits. Patient instructed to discontinue this medication.  .      Patient Active Problem List   Diagnosis    Acquired hypothyroidism    Benign prostatic hyperplasia with weak urinary stream    Incarcerated  "ventral hernia    Other insomnia    Dermoid cyst of left eyelid    Epiphora    Presbyopia    Pseudophakia    Diarrhea of infectious origin    BMI 22.0-22.9, adult     Advance Care Planning Advance Directive is not on file.  ACP discussion was declined by the patient. Patient does not have an advance directive, declines further assistance.            Objective   Vitals:    25 0836   BP: 102/62   BP Location: Right arm   Patient Position: Sitting   Cuff Size: Adult   Pulse: 75   Resp: 16   Temp: 97.3 °F (36.3 °C)   SpO2: 98%   Weight: 66 kg (145 lb 9.6 oz)   Height: 172.7 cm (67.99\")       Estimated body mass index is 22.14 kg/m² as calculated from the following:    Height as of this encounter: 172.7 cm (67.99\").    Weight as of this encounter: 66 kg (145 lb 9.6 oz).    BMI is within normal parameters. No other follow-up for BMI required.           Does the patient have evidence of cognitive impairment? No                                                                                                Health  Risk Assessment    Smoking Status:  Social History     Tobacco Use   Smoking Status Former    Current packs/day: 0.00    Average packs/day: 0.5 packs/day for 20.0 years (10.0 ttl pk-yrs)    Types: Cigarettes    Start date:     Quit date:     Years since quittin.4    Passive exposure: Past   Smokeless Tobacco Former    Types: Chew    Quit date:      Alcohol Consumption:  Social History     Substance and Sexual Activity   Alcohol Use No       Fall Risk Screen  STEADI Fall Risk Assessment was completed, and patient is at LOW risk for falls.Assessment completed on:2025    Depression Screening   Little interest or pleasure in doing things? Not at all   Feeling down, depressed, or hopeless? Not at all   PHQ-2 Total Score 0      Health Habits and Functional and Cognitive Screenin/19/2025     8:41 AM   Functional & Cognitive Status   Do you have difficulty preparing food and eating? No "   Do you have difficulty bathing yourself, getting dressed or grooming yourself? No   Do you have difficulty using the toilet? No   Do you have difficulty moving around from place to place? No   Do you have trouble with steps or getting out of a bed or a chair? No   Current Diet Well Balanced Diet   Dental Exam Up to date   Eye Exam Up to date   Exercise (times per week) 5 times per week   Current Exercises Include Walking   Do you need help using the phone?  No   Are you deaf or do you have serious difficulty hearing?  Yes   Do you need help to go to places out of walking distance? No   Do you need help shopping? No   Do you need help preparing meals?  No   Do you need help with housework?  No   Do you need help with laundry? No   Do you need help taking your medications? No   Do you need help managing money? No   Do you ever drive or ride in a car without wearing a seat belt? No   Have you felt unusual stress, anger or loneliness in the last month? No   Who do you live with? Alone   If you need help, do you have trouble finding someone available to you? No   Have you been bothered in the last four weeks by sexual problems? No   Do you have difficulty concentrating, remembering or making decisions? No           Age-appropriate Screening Schedule:  Refer to the list below for future screening recommendations based on patient's age, sex and/or medical conditions. Orders for these recommended tests are listed in the plan section. The patient has been provided with a written plan.    Health Maintenance List  Health Maintenance   Topic Date Due    TDAP/TD VACCINES (1 - Tdap) Never done    ZOSTER VACCINE (1 of 2) Never done    RSV Vaccine - Adults (1 - 1-dose 75+ series) Never done    LIPID PANEL  07/27/2024    COVID-19 Vaccine (4 - 2024-25 season) 09/01/2024    INFLUENZA VACCINE  07/01/2025    ANNUAL WELLNESS VISIT  06/19/2026    Pneumococcal Vaccine 50+  Completed                                                                                                                                                 CMS Preventative Services Quick Reference  Risk Factors Identified During Encounter  None Identified    The above risks/problems have been discussed with the patient.  Pertinent information has been shared with the patient in the After Visit Summary.  An After Visit Summary and PPPS were made available to the patient.    Follow Up:   Next Medicare Wellness visit to be scheduled in 1 year.         Additional E&M Note during same encounter follows:  Patient has additional, significant, and separately identifiable condition(s)/problem(s) that require work above and beyond the Medicare Wellness Visit     Chief Complaint  Medicare Wellness-subsequent (6 month f/u )    Subjective   89-year-old white male history of hypertension, BPH, hypothyroidism, incarcerated hernia and insomnia comes in with daughter today for Medicare wellness visit    Blood pressure 102/62 heart rate 76 he denies any chest pain, dyspnea, tachycardia or dizziness    Patient doing well he has been building fences out 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  They have no new complaints    He has dropped 8 pounds with all the activity outside of weight of 146 and a BMI of 22.1 but daughter states he eats like a horse so I do not think that is a problem probably due to exercise in the heat    He has had 2 COVID vaccines is eye exam is up-to-date.  He does not do any other preventative maintenance due to his age.  His next pneumonia booster is due in July 2028            Fasting blood work  Monitor weight at home  Be careful out in heat and humidity  Follow-up 6 months          Review of Systems   HENT: Negative.     Respiratory: Negative.     Cardiovascular: Negative.    Gastrointestinal: Negative.    Genitourinary: Negative.    Musculoskeletal: Negative.    Skin: Negative.    Neurological: Negative.    Psychiatric/Behavioral:  "Negative.                Objective   Vital Signs:  /62 (BP Location: Right arm, Patient Position: Sitting, Cuff Size: Adult)   Pulse 75   Temp 97.3 °F (36.3 °C)   Resp 16   Ht 172.7 cm (67.99\")   Wt 66 kg (145 lb 9.6 oz)   SpO2 98%   BMI 22.14 kg/m²   Physical Exam  Constitutional:       Appearance: Normal appearance.   HENT:      Head: Normocephalic.   Cardiovascular:      Rate and Rhythm: Normal rate and regular rhythm.      Pulses: Normal pulses.      Heart sounds: Normal heart sounds.   Pulmonary:      Effort: Pulmonary effort is normal.      Breath sounds: Normal breath sounds.   Abdominal:      General: Bowel sounds are normal.   Musculoskeletal:         General: Normal range of motion.   Skin:     General: Skin is warm.   Neurological:      General: No focal deficit present.      Mental Status: He is alert and oriented to person, place, and time.   Psychiatric:         Mood and Affect: Mood normal.         Behavior: Behavior normal.                    Assessment and Plan      Acquired hypothyroidism    Orders:    TSH+Free T4    T3    Lipid screening    Orders:    Lipid Panel With LDL / HDL Ratio    Elevated glucose    Orders:    Comprehensive Metabolic Panel    Hemoglobin A1c    Abnormal CBC    Orders:    CBC & Differential    BMI 22.0-22.9, adult                 Follow Up   Return in about 6 months (around 12/19/2025).  Patient was given instructions and counseling regarding his condition or for health maintenance advice. Please see specific information pulled into the AVS if appropriate.    "

## 2025-06-20 LAB
ALBUMIN SERPL-MCNC: 3.9 G/DL (ref 3.7–4.7)
ALP SERPL-CCNC: 84 IU/L (ref 44–121)
ALT SERPL-CCNC: 8 IU/L (ref 0–44)
AST SERPL-CCNC: 20 IU/L (ref 0–40)
BASOPHILS # BLD AUTO: 0.1 X10E3/UL (ref 0–0.2)
BASOPHILS NFR BLD AUTO: 1 %
BILIRUB SERPL-MCNC: 0.4 MG/DL (ref 0–1.2)
BUN SERPL-MCNC: 17 MG/DL (ref 8–27)
BUN/CREAT SERPL: 18 (ref 10–24)
CALCIUM SERPL-MCNC: 9.3 MG/DL (ref 8.6–10.2)
CHLORIDE SERPL-SCNC: 101 MMOL/L (ref 96–106)
CHOLEST SERPL-MCNC: 144 MG/DL (ref 100–199)
CO2 SERPL-SCNC: 22 MMOL/L (ref 20–29)
CREAT SERPL-MCNC: 0.92 MG/DL (ref 0.76–1.27)
EGFRCR SERPLBLD CKD-EPI 2021: 80 ML/MIN/1.73
EOSINOPHIL # BLD AUTO: 0.2 X10E3/UL (ref 0–0.4)
EOSINOPHIL NFR BLD AUTO: 3 %
ERYTHROCYTE [DISTWIDTH] IN BLOOD BY AUTOMATED COUNT: 13.7 % (ref 11.6–15.4)
GLOBULIN SER CALC-MCNC: 2.5 G/DL (ref 1.5–4.5)
GLUCOSE SERPL-MCNC: 91 MG/DL (ref 70–99)
HBA1C MFR BLD: 6 % (ref 4.8–5.6)
HCT VFR BLD AUTO: 40.9 % (ref 37.5–51)
HDLC SERPL-MCNC: 44 MG/DL
HGB BLD-MCNC: 12.6 G/DL (ref 13–17.7)
IMM GRANULOCYTES # BLD AUTO: 0 X10E3/UL (ref 0–0.1)
IMM GRANULOCYTES NFR BLD AUTO: 0 %
LDLC SERPL CALC-MCNC: 88 MG/DL (ref 0–99)
LDLC/HDLC SERPL: 2 RATIO (ref 0–3.6)
LYMPHOCYTES # BLD AUTO: 1.5 X10E3/UL (ref 0.7–3.1)
LYMPHOCYTES NFR BLD AUTO: 21 %
MCH RBC QN AUTO: 30.2 PG (ref 26.6–33)
MCHC RBC AUTO-ENTMCNC: 30.8 G/DL (ref 31.5–35.7)
MCV RBC AUTO: 98 FL (ref 79–97)
MONOCYTES # BLD AUTO: 0.6 X10E3/UL (ref 0.1–0.9)
MONOCYTES NFR BLD AUTO: 9 %
NEUTROPHILS # BLD AUTO: 4.7 X10E3/UL (ref 1.4–7)
NEUTROPHILS NFR BLD AUTO: 66 %
PLATELET # BLD AUTO: 248 X10E3/UL (ref 150–450)
POTASSIUM SERPL-SCNC: 4.7 MMOL/L (ref 3.5–5.2)
PROT SERPL-MCNC: 6.4 G/DL (ref 6–8.5)
RBC # BLD AUTO: 4.17 X10E6/UL (ref 4.14–5.8)
SODIUM SERPL-SCNC: 142 MMOL/L (ref 134–144)
T3 SERPL-MCNC: 110 NG/DL (ref 71–180)
T4 FREE SERPL-MCNC: 1.55 NG/DL (ref 0.82–1.77)
TRIGL SERPL-MCNC: 55 MG/DL (ref 0–149)
TSH SERPL DL<=0.005 MIU/L-ACNC: 0.24 UIU/ML (ref 0.45–4.5)
VLDLC SERPL CALC-MCNC: 12 MG/DL (ref 5–40)
WBC # BLD AUTO: 7.1 X10E3/UL (ref 3.4–10.8)

## 2025-06-23 RX ORDER — LEVOTHYROXINE SODIUM 100 UG/1
100 TABLET ORAL
Qty: 90 TABLET | Refills: 0 | Status: SHIPPED | OUTPATIENT
Start: 2025-06-23

## (undated) DEVICE — SUT PROLN 0 CT1 30IN 8424H

## (undated) DEVICE — ABDOMINAL BINDER: Brand: DEROYAL

## (undated) DEVICE — CVR HNDL LT SURG ACCSSRY BLU STRL

## (undated) DEVICE — TTL1LYR ADD-A-CATH DB TRAY: Brand: MEDLINE

## (undated) DEVICE — COVER,MAYO STAND,STERILE: Brand: MEDLINE

## (undated) DEVICE — 3M™ PATIENT PLATE, CORDED, SPLIT, LARGE, 40 PER CASE, 1179: Brand: 3M™

## (undated) DEVICE — TUBING, SUCTION, 1/4" X 12', STRAIGHT: Brand: MEDLINE

## (undated) DEVICE — GLV SURG BIOGEL LTX PF 7

## (undated) DEVICE — BLAKE SILICONE DRAIN, 19 FR ROUND, HUBLESS WITH 1/4" BENDABLE TROCAR: Brand: BLAKE

## (undated) DEVICE — SUT SILK 3/0 SH CR8 18IN C013D

## (undated) DEVICE — CUFF SCD HEMOFORCE SEQ CALF STD MD

## (undated) DEVICE — TOWEL,OR,DSP,ST,BLUE,DLX,XR,4/PK,20PK/CS: Brand: MEDLINE

## (undated) DEVICE — SUT VIC 3/0 SH 27IN J416H

## (undated) DEVICE — UNDERGLV SURG BIOGEL INDICATOR LTX PF 7

## (undated) DEVICE — ENSEAL 20 CM SHAFT, LARGE JAW: Brand: ENSEAL X1

## (undated) DEVICE — SUT SILK 2/0 FS BLK 18IN 685G

## (undated) DEVICE — TP SXN YANKR BULB STRL

## (undated) DEVICE — PENCL HND ROCKRSWTCH HOLSTR EZ CLEAN TP CRD 10FT

## (undated) DEVICE — CVR HNDL LT CAM LB54

## (undated) DEVICE — CATHETER,FOLEY,100%SILICONE,14FR,10ML,LF: Brand: MEDLINE

## (undated) DEVICE — PK MAJ LAPAROTOMY 50

## (undated) DEVICE — BLAKE SILICONE DRAIN, 15 FR ROUND, HUBLESS: Brand: BLAKE

## (undated) DEVICE — GAUZE,SPONGE,FLUFF,6"X6.75",STRL,5/TRAY: Brand: MEDLINE

## (undated) DEVICE — PK PROC TURNOVER

## (undated) DEVICE — COVADERM: Brand: DEROYAL

## (undated) DEVICE — SOL IRRIG H2O 1000ML STRL

## (undated) DEVICE — CATH FOL COUNCL 2WY 16F 5CC

## (undated) DEVICE — NITINOL WIRE WITH HYDROPHILIC TIP: Brand: SENSOR

## (undated) DEVICE — CATHETER,FOLEY,100% SIL,COUDE,16FR 10ML: Brand: MEDLINE

## (undated) DEVICE — 450 ML BOTTLE OF 0.05% CHLORHEXIDINE GLUCONATE IN 99.95% STERILE WATER FOR IRRIGATION, USP AND APPLICATOR.: Brand: IRRISEPT ANTIMICROBIAL WOUND LAVAGE

## (undated) DEVICE — SUT PDS 0 CT-1 Z340H

## (undated) DEVICE — COVADERM PLUS: Brand: DEROYAL

## (undated) DEVICE — CATHETER,FOLEY,100% SIL,COUDE,20FR 10ML: Brand: MEDLINE

## (undated) DEVICE — DRN WND EVAC BULB 100CC

## (undated) DEVICE — GOWN,REINFORCE,POLY,SIRUS,BREATH SLV,XLG: Brand: MEDLINE

## (undated) DEVICE — TBG IRRI TUR Y/TYP NONVENT 98IN LF

## (undated) DEVICE — SPNG LAP PREWSH SFTPK 18X18IN STRL PK/5